# Patient Record
Sex: FEMALE | Race: WHITE | NOT HISPANIC OR LATINO | Employment: FULL TIME | URBAN - METROPOLITAN AREA
[De-identification: names, ages, dates, MRNs, and addresses within clinical notes are randomized per-mention and may not be internally consistent; named-entity substitution may affect disease eponyms.]

---

## 2017-04-10 ENCOUNTER — OFFICE VISIT (OUTPATIENT)
Dept: PODIATRY | Facility: CLINIC | Age: 57
End: 2017-04-10
Payer: COMMERCIAL

## 2017-04-10 VITALS
WEIGHT: 120 LBS | HEART RATE: 97 BPM | BODY MASS INDEX: 22.66 KG/M2 | DIASTOLIC BLOOD PRESSURE: 77 MMHG | SYSTOLIC BLOOD PRESSURE: 121 MMHG | HEIGHT: 61 IN | RESPIRATION RATE: 18 BRPM

## 2017-04-10 DIAGNOSIS — L60.9 DISEASE OF NAIL: Primary | ICD-10-CM

## 2017-04-10 PROCEDURE — 1160F RVW MEDS BY RX/DR IN RCRD: CPT | Mod: S$GLB,,, | Performed by: PODIATRIST

## 2017-04-10 PROCEDURE — 99203 OFFICE O/P NEW LOW 30 MIN: CPT | Mod: S$GLB,,, | Performed by: PODIATRIST

## 2017-04-10 PROCEDURE — 99999 PR PBB SHADOW E&M-EST. PATIENT-LVL III: CPT | Mod: PBBFAC,,, | Performed by: PODIATRIST

## 2017-04-10 RX ORDER — BUTALBITAL, ACETAMINOPHEN AND CAFFEINE 50; 325; 40 MG/1; MG/1; MG/1
TABLET ORAL
Refills: 0 | COMMUNITY
Start: 2017-03-14 | End: 2022-03-25

## 2017-04-10 RX ORDER — METHYLPREDNISOLONE 4 MG/1
TABLET ORAL
Refills: 0 | COMMUNITY
Start: 2017-03-22 | End: 2017-06-23

## 2017-04-10 NOTE — LETTER
April 10, 2017      Edwige Addison MD  [address]  [place] [address]           [facility]  [address]  [place] [address]  Phone: [phone]          Patient: Carmen Manriquez   MR Number: [id number]   YOB: 1960   Date of Visit: 4/10/2017       Dear Dr. Edwige Addison:    Thank you for referring Carmen Manriquez to me for evaluation. Attached you will find relevant portions of my assessment and plan of care.    If you have questions, please do not hesitate to call me. I look forward to following Carmen Manriquez along with you.    Sincerely,    Shruthi Brvao, MATIAS    Enclosure  CC:  No Recipients    If you would like to receive this communication electronically, please contact [email or web address] or [phone] to request more information on To8to Link access.    For providers and/or their staff who would like to refer a patient to [facility], please contact us through our one-stop-shop provider referral line, [facility], at [phone].    If you feel you have received this communication in error or would no longer like to receive these types of communications, please e-mail [email or web address]
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
## 2017-06-23 ENCOUNTER — HOSPITAL ENCOUNTER (OUTPATIENT)
Dept: RADIOLOGY | Facility: OTHER | Age: 57
Discharge: HOME | End: 2017-06-23
Attending: OTOLARYNGOLOGY
Payer: COMMERCIAL

## 2017-06-23 ENCOUNTER — ANESTHESIA EVENT (OUTPATIENT)
Dept: SURGERY | Facility: OTHER | Age: 57
End: 2017-06-23
Payer: COMMERCIAL

## 2017-06-23 ENCOUNTER — HOSPITAL ENCOUNTER (OUTPATIENT)
Dept: PREADMISSION TESTING | Facility: OTHER | Age: 57
Discharge: HOME | End: 2017-06-23
Attending: OTOLARYNGOLOGY
Payer: COMMERCIAL

## 2017-06-23 VITALS
HEART RATE: 80 BPM | DIASTOLIC BLOOD PRESSURE: 71 MMHG | HEIGHT: 62 IN | SYSTOLIC BLOOD PRESSURE: 138 MMHG | TEMPERATURE: 98 F | WEIGHT: 114 LBS | BODY MASS INDEX: 20.98 KG/M2 | OXYGEN SATURATION: 99 %

## 2017-06-23 DIAGNOSIS — J32.4 CHRONIC PANSINUSITIS: ICD-10-CM

## 2017-06-23 PROCEDURE — 70486 CT MAXILLOFACIAL W/O DYE: CPT | Mod: TC

## 2017-06-23 PROCEDURE — 70486 CT MAXILLOFACIAL W/O DYE: CPT | Mod: 26,,, | Performed by: RADIOLOGY

## 2017-06-23 RX ORDER — MIDAZOLAM HYDROCHLORIDE 5 MG/ML
5 INJECTION INTRAMUSCULAR; INTRAVENOUS
Status: DISCONTINUED | OUTPATIENT
Start: 2017-06-27 | End: 2017-06-24 | Stop reason: HOSPADM

## 2017-06-23 RX ORDER — FAMOTIDINE 20 MG/1
20 TABLET, FILM COATED ORAL
Status: CANCELLED | OUTPATIENT
Start: 2017-06-23 | End: 2017-06-23

## 2017-06-23 RX ORDER — SODIUM CHLORIDE, SODIUM LACTATE, POTASSIUM CHLORIDE, CALCIUM CHLORIDE 600; 310; 30; 20 MG/100ML; MG/100ML; MG/100ML; MG/100ML
INJECTION, SOLUTION INTRAVENOUS CONTINUOUS
Status: CANCELLED | OUTPATIENT
Start: 2017-06-23

## 2017-06-23 NOTE — DISCHARGE INSTRUCTIONS
PRE-ADMIT TESTING -  [phone]    [address]  [facility]        OUTPATIENT SURGERY UNIT - [phone]    Your surgery has been scheduled at [facility]. We are pleased to have the opportunity to serve you. For Further Information please call [phone].    On the day of surgery please report to the Information Desk on the 1st floor.    · CONTACT YOUR PHYSICIAN'S OFFICE THE DAY PRIOR TO YOUR SURGERY TO OBTAIN YOUR ARRIVAL TIME.     · The evening before surgery do not eat anything after 9 p.m. ( this includes hard candy, chewing gum and mints).  You may only have GATORADE, POWERADE AND WATER  from 9 p.m. until you leave your home.   DO NOT DRINK ANY LIQUIDS ON THE WAY TO THE HOSPITAL.      SPECIAL MEDICATION INSTRUCTIONS: TAKE medications checked off by the Anesthesiologist on your Medication List.    Angiogram Patients: Take medications as instructed by your physician, including aspirin.     Surgery Patients:    If you take ASPIRIN - Your PHYSICIAN/SURGEON will need to inform you IF/OR when you need to stop taking aspirin prior to your surgery.     Do Not take any medications containing IBUPROFEN.  Do Not Wear any make-up or dark nail polish   (especially eye make-up) to surgery. If you come to surgery with makeup on you will be required to remove the makeup or nail polish.    Do not shave your surgical area at least 5 days prior to your surgery. The surgical prep will be performed at the hospital according to Infection Control regulations.    Leave all valuables at home.   Do Not wear any jewelry or watches, including any metal in body piercings.  Contact Lens must be removed before surgery. Either do not wear the contact lens or bring a case and solution for storage.  Please bring a container for eyeglasses or dentures as required.  Bring any paperwork your physician has provided, such as consent forms,  history and physicals, doctor's orders, etc.   Bring comfortable clothes  that are loose fitting to wear upon discharge. Take into consideration the type of surgery being performed.  Maintain your diet as advised per your physician the day prior to surgery.      Adequate rest the night before surgery is advised.   Park in the Parking lot behind the hospital or in the [facility] across the street from the parking lot. Parking is complimentary.  If you will be discharged the same day as your procedure, please arrange for a responsible adult to drive you home or to accompany you if traveling by taxi.   YOU WILL NOT BE PERMITTED TO DRIVE OR TO LEAVE THE HOSPITAL ALONE AFTER SURGERY.   It is strongly recommended that you arrange for someone to remain with you for the first 24 hrs following your surgery.       Thank you for your cooperation.  The Staff of [facility].        Bathing Instructions                                                                 Please shower the evening before and morning of your procedure with    ANTIBACTERIAL SOAP. ( DIAL, etc )  Concentrate on the surgical area   for at least 3 minutes and rinse completely. Dry off as usual.   Do not use any deodorant, powder, body lotions, perfume, after shave or    cologne.

## 2017-06-23 NOTE — ANESTHESIA PREPROCEDURE EVALUATION
06/23/2017  Carmen Manriquez is a 57 y.o., female.    Anesthesia Evaluation    I have reviewed the Patient Summary Reports.    I have reviewed the Nursing Notes.   I have reviewed the Medications.     Review of Systems  Anesthesia Hx:  No problems with previous Anesthesia  Denies Family Hx of Anesthesia complications.   Denies Personal Hx of Anesthesia complications.   Social:  Former Smoker 6 cigs/day x one yr   Hematology/Oncology:  Hematology Normal   Oncology Normal     Cardiovascular:  Cardiovascular Normal     Pulmonary:  Pulmonary Normal    Renal/:  Renal/ Normal     Hepatic/GI:   GERD, well controlled    Musculoskeletal:  Musculoskeletal Normal    Neurological:  Neurology Normal    Endocrine:  Endocrine Normal        Physical Exam  General:  Well nourished    Airway/Jaw/Neck:  Airway Findings: Mouth Opening: Normal Tongue: Normal  General Airway Assessment: Adult  Mallampati: I  TM Distance: Normal, at least 6 cm         Dental:  Dental Findings: In tact             Anesthesia Plan  Type of Anesthesia, risks & benefits discussed:  Anesthesia Type:  general  Patient's Preference:   Intra-op Monitoring Plan: standard ASA monitors  Intra-op Monitoring Plan Comments:   Post Op Pain Control Plan:   Post Op Pain Control Plan Comments:   Induction:   IV  Beta Blocker:         Informed Consent: Patient understands risks and agrees with Anesthesia plan.  Questions answered. Anesthesia consent signed with patient.  ASA Score: 2     Day of Surgery Review of History & Physical:    H&P update referred to the surgeon.         Ready For Surgery From Anesthesia Perspective.

## 2017-06-26 DIAGNOSIS — Z12.31 OTHER SCREENING MAMMOGRAM: ICD-10-CM

## 2017-06-27 ENCOUNTER — HOSPITAL ENCOUNTER (OUTPATIENT)
Facility: OTHER | Age: 57
Discharge: HOME | End: 2017-06-27
Attending: OTOLARYNGOLOGY | Admitting: OTOLARYNGOLOGY
Payer: COMMERCIAL

## 2017-06-27 ENCOUNTER — ANESTHESIA (OUTPATIENT)
Dept: SURGERY | Facility: OTHER | Age: 57
End: 2017-06-27
Payer: COMMERCIAL

## 2017-06-27 VITALS
SYSTOLIC BLOOD PRESSURE: 120 MMHG | HEART RATE: 61 BPM | HEIGHT: 62 IN | OXYGEN SATURATION: 96 % | WEIGHT: 114 LBS | BODY MASS INDEX: 20.98 KG/M2 | DIASTOLIC BLOOD PRESSURE: 67 MMHG | TEMPERATURE: 98 F | RESPIRATION RATE: 16 BRPM

## 2017-06-27 DIAGNOSIS — H69.91 ACUTE DYSFUNCTION OF EUSTACHIAN TUBE, RIGHT: Primary | ICD-10-CM

## 2017-06-27 PROBLEM — H69.90: Status: ACTIVE | Noted: 2017-06-27

## 2017-06-27 PROCEDURE — 27201423 OPTIME MED/SURG SUP & DEVICES STERILE SUPPLY: Performed by: OTOLARYNGOLOGY

## 2017-06-27 PROCEDURE — 37000008 HC ANESTHESIA 1ST 15 MINUTES: Performed by: OTOLARYNGOLOGY

## 2017-06-27 PROCEDURE — 36000708 HC OR TIME LEV III 1ST 15 MIN: Performed by: OTOLARYNGOLOGY

## 2017-06-27 PROCEDURE — 36000709 HC OR TIME LEV III EA ADD 15 MIN: Performed by: OTOLARYNGOLOGY

## 2017-06-27 PROCEDURE — 37000009 HC ANESTHESIA EA ADD 15 MINS: Performed by: OTOLARYNGOLOGY

## 2017-06-27 PROCEDURE — 36000711: Performed by: OTOLARYNGOLOGY

## 2017-06-27 PROCEDURE — 88305 TISSUE EXAM BY PATHOLOGIST: CPT | Mod: 26,,, | Performed by: PATHOLOGY

## 2017-06-27 PROCEDURE — 63600175 PHARM REV CODE 636 W HCPCS: Performed by: ANESTHESIOLOGY

## 2017-06-27 PROCEDURE — 63600175 PHARM REV CODE 636 W HCPCS: Performed by: NURSE ANESTHETIST, CERTIFIED REGISTERED

## 2017-06-27 PROCEDURE — 36000710: Performed by: OTOLARYNGOLOGY

## 2017-06-27 PROCEDURE — 71000033 HC RECOVERY, INTIAL HOUR: Performed by: OTOLARYNGOLOGY

## 2017-06-27 PROCEDURE — 25000003 PHARM REV CODE 250: Performed by: ANESTHESIOLOGY

## 2017-06-27 PROCEDURE — 25000003 PHARM REV CODE 250: Performed by: NURSE ANESTHETIST, CERTIFIED REGISTERED

## 2017-06-27 PROCEDURE — 27800903 OPTIME MED/SURG SUP & DEVICES OTHER IMPLANTS: Performed by: OTOLARYNGOLOGY

## 2017-06-27 PROCEDURE — 71000039 HC RECOVERY, EACH ADD'L HOUR: Performed by: OTOLARYNGOLOGY

## 2017-06-27 PROCEDURE — 88305 TISSUE EXAM BY PATHOLOGIST: CPT | Performed by: PATHOLOGY

## 2017-06-27 PROCEDURE — 71000015 HC POSTOP RECOV 1ST HR: Performed by: OTOLARYNGOLOGY

## 2017-06-27 PROCEDURE — 25000003 PHARM REV CODE 250: Performed by: OTOLARYNGOLOGY

## 2017-06-27 PROCEDURE — 71000016 HC POSTOP RECOV ADDL HR: Performed by: OTOLARYNGOLOGY

## 2017-06-27 DEVICE — IMPLANTABLE DEVICE: Type: IMPLANTABLE DEVICE | Site: EAR | Status: FUNCTIONAL

## 2017-06-27 RX ORDER — ACETAMINOPHEN 10 MG/ML
INJECTION, SOLUTION INTRAVENOUS
Status: DISCONTINUED | OUTPATIENT
Start: 2017-06-27 | End: 2017-06-27

## 2017-06-27 RX ORDER — ONDANSETRON 8 MG/1
8 TABLET, ORALLY DISINTEGRATING ORAL EVERY 8 HOURS PRN
Qty: 10 TABLET | Refills: 0 | Status: SHIPPED | OUTPATIENT
Start: 2017-06-27 | End: 2017-11-07

## 2017-06-27 RX ORDER — GLYCOPYRROLATE 0.2 MG/ML
INJECTION INTRAMUSCULAR; INTRAVENOUS
Status: DISCONTINUED | OUTPATIENT
Start: 2017-06-27 | End: 2017-06-27

## 2017-06-27 RX ORDER — FENTANYL CITRATE 50 UG/ML
INJECTION, SOLUTION INTRAMUSCULAR; INTRAVENOUS
Status: DISCONTINUED | OUTPATIENT
Start: 2017-06-27 | End: 2017-06-27

## 2017-06-27 RX ORDER — FAMOTIDINE 20 MG/1
20 TABLET, FILM COATED ORAL
Status: COMPLETED | OUTPATIENT
Start: 2017-06-27 | End: 2017-06-27

## 2017-06-27 RX ORDER — SODIUM CHLORIDE, SODIUM LACTATE, POTASSIUM CHLORIDE, CALCIUM CHLORIDE 600; 310; 30; 20 MG/100ML; MG/100ML; MG/100ML; MG/100ML
INJECTION, SOLUTION INTRAVENOUS CONTINUOUS
Status: DISCONTINUED | OUTPATIENT
Start: 2017-06-27 | End: 2017-06-27 | Stop reason: HOSPADM

## 2017-06-27 RX ORDER — OFLOXACIN 3 MG/ML
SOLUTION/ DROPS OPHTHALMIC
Qty: 7.5 ML | Refills: 1 | Status: SHIPPED | OUTPATIENT
Start: 2017-06-27 | End: 2017-11-07

## 2017-06-27 RX ORDER — OXYCODONE HYDROCHLORIDE 5 MG/1
5 TABLET ORAL
Status: DISCONTINUED | OUTPATIENT
Start: 2017-06-27 | End: 2017-06-27 | Stop reason: HOSPADM

## 2017-06-27 RX ORDER — ROCURONIUM BROMIDE 10 MG/ML
INJECTION, SOLUTION INTRAVENOUS
Status: DISCONTINUED | OUTPATIENT
Start: 2017-06-27 | End: 2017-06-27

## 2017-06-27 RX ORDER — HYDROMORPHONE HYDROCHLORIDE 2 MG/ML
0.4 INJECTION, SOLUTION INTRAMUSCULAR; INTRAVENOUS; SUBCUTANEOUS EVERY 5 MIN PRN
Status: DISCONTINUED | OUTPATIENT
Start: 2017-06-27 | End: 2017-06-27 | Stop reason: HOSPADM

## 2017-06-27 RX ORDER — HYDROCODONE BITARTRATE AND ACETAMINOPHEN 5; 325 MG/1; MG/1
1 TABLET ORAL EVERY 4 HOURS PRN
Status: DISCONTINUED | OUTPATIENT
Start: 2017-06-27 | End: 2017-06-27 | Stop reason: HOSPADM

## 2017-06-27 RX ORDER — SODIUM CHLORIDE 0.9 % (FLUSH) 0.9 %
3 SYRINGE (ML) INJECTION EVERY 8 HOURS
Status: DISCONTINUED | OUTPATIENT
Start: 2017-06-27 | End: 2017-06-27 | Stop reason: HOSPADM

## 2017-06-27 RX ORDER — ONDANSETRON 2 MG/ML
INJECTION INTRAMUSCULAR; INTRAVENOUS
Status: DISCONTINUED | OUTPATIENT
Start: 2017-06-27 | End: 2017-06-27

## 2017-06-27 RX ORDER — CEPHALEXIN 500 MG/1
500 CAPSULE ORAL 3 TIMES DAILY
Qty: 30 CAPSULE | Refills: 0 | Status: SHIPPED | OUTPATIENT
Start: 2017-06-27 | End: 2017-11-07

## 2017-06-27 RX ORDER — MIDAZOLAM HYDROCHLORIDE 1 MG/ML
INJECTION INTRAMUSCULAR; INTRAVENOUS
Status: DISCONTINUED | OUTPATIENT
Start: 2017-06-27 | End: 2017-06-27

## 2017-06-27 RX ORDER — PROPOFOL 10 MG/ML
VIAL (ML) INTRAVENOUS
Status: DISCONTINUED | OUTPATIENT
Start: 2017-06-27 | End: 2017-06-27

## 2017-06-27 RX ORDER — DEXAMETHASONE SODIUM PHOSPHATE 4 MG/ML
INJECTION, SOLUTION INTRA-ARTICULAR; INTRALESIONAL; INTRAMUSCULAR; INTRAVENOUS; SOFT TISSUE
Status: DISCONTINUED | OUTPATIENT
Start: 2017-06-27 | End: 2017-06-27

## 2017-06-27 RX ORDER — LIDOCAINE HCL/PF 100 MG/5ML
SYRINGE (ML) INTRAVENOUS
Status: DISCONTINUED | OUTPATIENT
Start: 2017-06-27 | End: 2017-06-27

## 2017-06-27 RX ORDER — ONDANSETRON 2 MG/ML
4 INJECTION INTRAMUSCULAR; INTRAVENOUS EVERY 4 HOURS PRN
Status: DISCONTINUED | OUTPATIENT
Start: 2017-06-27 | End: 2017-06-27 | Stop reason: HOSPADM

## 2017-06-27 RX ORDER — NEOSTIGMINE METHYLSULFATE 1 MG/ML
INJECTION, SOLUTION INTRAVENOUS
Status: DISCONTINUED | OUTPATIENT
Start: 2017-06-27 | End: 2017-06-27

## 2017-06-27 RX ORDER — SODIUM CHLORIDE 0.9 % (FLUSH) 0.9 %
3 SYRINGE (ML) INJECTION
Status: DISCONTINUED | OUTPATIENT
Start: 2017-06-27 | End: 2017-06-27 | Stop reason: HOSPADM

## 2017-06-27 RX ORDER — HYDROCODONE BITARTRATE AND ACETAMINOPHEN 5; 325 MG/1; MG/1
1 TABLET ORAL EVERY 4 HOURS PRN
Qty: 40 TABLET | Refills: 0 | Status: SHIPPED | OUTPATIENT
Start: 2017-06-27 | End: 2017-11-07

## 2017-06-27 RX ORDER — FENTANYL CITRATE 50 UG/ML
25 INJECTION, SOLUTION INTRAMUSCULAR; INTRAVENOUS EVERY 5 MIN PRN
Status: DISCONTINUED | OUTPATIENT
Start: 2017-06-27 | End: 2017-06-27 | Stop reason: HOSPADM

## 2017-06-27 RX ORDER — OXYMETAZOLINE HCL 0.05 %
SPRAY, NON-AEROSOL (ML) NASAL
Status: DISCONTINUED | OUTPATIENT
Start: 2017-06-27 | End: 2017-06-27 | Stop reason: HOSPADM

## 2017-06-27 RX ORDER — PHENYLEPHRINE HYDROCHLORIDE 10 MG/ML
INJECTION INTRAVENOUS
Status: DISCONTINUED | OUTPATIENT
Start: 2017-06-27 | End: 2017-06-27

## 2017-06-27 RX ORDER — ONDANSETRON 8 MG/1
8 TABLET, ORALLY DISINTEGRATING ORAL EVERY 8 HOURS PRN
Status: DISCONTINUED | OUTPATIENT
Start: 2017-06-27 | End: 2017-06-27 | Stop reason: HOSPADM

## 2017-06-27 RX ORDER — MEPERIDINE HYDROCHLORIDE 50 MG/ML
12.5 INJECTION INTRAMUSCULAR; INTRAVENOUS; SUBCUTANEOUS ONCE AS NEEDED
Status: DISCONTINUED | OUTPATIENT
Start: 2017-06-27 | End: 2017-06-27 | Stop reason: HOSPADM

## 2017-06-27 RX ADMIN — GLYCOPYRROLATE 0.8 MG: 0.2 INJECTION, SOLUTION INTRAMUSCULAR; INTRAVENOUS at 11:06

## 2017-06-27 RX ADMIN — ONDANSETRON 8 MG: 8 TABLET, ORALLY DISINTEGRATING ORAL at 01:06

## 2017-06-27 RX ADMIN — GLYCOPYRROLATE 0.2 MG: 0.2 INJECTION, SOLUTION INTRAMUSCULAR; INTRAVENOUS at 10:06

## 2017-06-27 RX ADMIN — ROCURONIUM BROMIDE 40 MG: 10 INJECTION, SOLUTION INTRAVENOUS at 10:06

## 2017-06-27 RX ADMIN — LIDOCAINE HYDROCHLORIDE 100 MG: 20 INJECTION, SOLUTION INTRAVENOUS at 10:06

## 2017-06-27 RX ADMIN — FAMOTIDINE 20 MG: 20 TABLET, FILM COATED ORAL at 07:06

## 2017-06-27 RX ADMIN — PHENYLEPHRINE HYDROCHLORIDE 200 MCG: 10 INJECTION INTRAVENOUS at 10:06

## 2017-06-27 RX ADMIN — MIDAZOLAM HYDROCHLORIDE 2 MG: 1 INJECTION, SOLUTION INTRAMUSCULAR; INTRAVENOUS at 08:06

## 2017-06-27 RX ADMIN — CARBOXYMETHYLCELLULOSE SODIUM 2 DROP: 2.5 SOLUTION/ DROPS OPHTHALMIC at 10:06

## 2017-06-27 RX ADMIN — HYDROMORPHONE HYDROCHLORIDE 0.4 MG: 2 INJECTION INTRAMUSCULAR; INTRAVENOUS; SUBCUTANEOUS at 12:06

## 2017-06-27 RX ADMIN — FENTANYL CITRATE 100 MCG: 50 INJECTION, SOLUTION INTRAMUSCULAR; INTRAVENOUS at 10:06

## 2017-06-27 RX ADMIN — ACETAMINOPHEN 1000 MG: 10 INJECTION, SOLUTION INTRAVENOUS at 10:06

## 2017-06-27 RX ADMIN — MIDAZOLAM HYDROCHLORIDE 2 MG: 1 INJECTION, SOLUTION INTRAMUSCULAR; INTRAVENOUS at 09:06

## 2017-06-27 RX ADMIN — PROPOFOL 200 MG: 10 INJECTION, EMULSION INTRAVENOUS at 10:06

## 2017-06-27 RX ADMIN — NEOSTIGMINE METHYLSULFATE 5 MG: 1 INJECTION INTRAVENOUS at 11:06

## 2017-06-27 RX ADMIN — HYDROMORPHONE HYDROCHLORIDE 0.4 MG: 2 INJECTION INTRAMUSCULAR; INTRAVENOUS; SUBCUTANEOUS at 11:06

## 2017-06-27 RX ADMIN — OXYCODONE HYDROCHLORIDE 5 MG: 5 TABLET ORAL at 11:06

## 2017-06-27 RX ADMIN — PHENYLEPHRINE HYDROCHLORIDE 300 MCG: 10 INJECTION INTRAVENOUS at 10:06

## 2017-06-27 RX ADMIN — ONDANSETRON 4 MG: 2 INJECTION INTRAMUSCULAR; INTRAVENOUS at 10:06

## 2017-06-27 RX ADMIN — SODIUM CHLORIDE, SODIUM LACTATE, POTASSIUM CHLORIDE, AND CALCIUM CHLORIDE: 600; 310; 30; 20 INJECTION, SOLUTION INTRAVENOUS at 07:06

## 2017-06-27 RX ADMIN — DEXAMETHASONE SODIUM PHOSPHATE 8 MG: 4 INJECTION, SOLUTION INTRAMUSCULAR; INTRAVENOUS at 10:06

## 2017-06-27 NOTE — BRIEF OP NOTE
[facility]  Brief Operative Note     SUMMARY     Surgery Date: 6/27/2017     Surgeon(s) and Role:     * Shea Moss MD - Primary    Assisting Surgeon: None    Pre-op Diagnosis:  Acute dysfunction of eustachian tube, right [H69.81]    Post-op Diagnosis:  Post-Op Diagnosis Codes:     * Acute dysfunction of eustachian tube, right [H69.81]    Procedure(s) (LRB):  INSERTION TUBE-PE (Right)  ETHMOIDECTOMY (N/A)  SINUS SURGERY FUNCTIONAL ENDOSCOPIC (N/A)    Anesthesia: General    Description of the findings of the procedure: right effusion, chronic inflammatory changes in maxillary sinuses    Findings/Key Components: same    Estimated Blood Loss: * No values recorded between 6/27/2017 10:28 AM and 6/27/2017 11:23 AM *         Specimens:   Specimen (12h ago through future)    Start     Ordered    06/27/17 1118  Specimen to Pathology - Surgery  Once     Comments:  1. Left sinus content2. Sinus shavings      06/27/17 1117          Discharge Note    SUMMARY     Admit Date: 6/27/2017    Discharge Date and Time:  06/27/2017 11:23 AM    Hospital Course (synopsis of major diagnoses, care, treatment, and services provided during the course of the hospital stay): see operative report     Final Diagnosis: Post-Op Diagnosis Codes:     * Acute dysfunction of eustachian tube, right [H69.81]    Disposition: Home or Self Care    Follow Up/Patient Instructions:     Medications:  Reconciled Home Medications:   Current Discharge Medication List      START taking these medications    Details   cephALEXin (KEFLEX) 500 MG capsule Take 1 capsule (500 mg total) by mouth 3 (three) times daily.  Qty: 30 capsule, Refills: 0      hydrocodone-acetaminophen 5-325mg (NORCO) 5-325 mg per tablet Take 1 tablet by mouth every 4 (four) hours as needed for Pain.  Qty: 40 tablet, Refills: 0      ofloxacin (OCUFLOX) 0.3 % ophthalmic solution Use 4 drops to right ear 2x/day  Qty: 7.5 mL, Refills: 1      ondansetron (ZOFRAN-ODT) 8 MG  TbDL Take 1 tablet (8 mg total) by mouth every 8 (eight) hours as needed.  Qty: 10 tablet, Refills: 0         CONTINUE these medications which have NOT CHANGED    Details   butalbital-acetaminophen-caffeine -40 mg (FIORICET, ESGIC) -40 mg per tablet take 1 tablet by mouth every 4 hours if needed for headache  Refills: 0      lorazepam (ATIVAN) 0.5 MG tablet Take 0.5 mg by mouth as needed for Anxiety.      TAZORAC 0.05 % Crea cream Apply to face nightly as needed             Discharge Procedure Orders  Diet general   Order Comments: Advance diet as tolerated.     Activity as tolerated   Order Comments: Sleep head of bed elevated for 72hrs  No heavy lifting  No nose blowing  No strenuous activity  No driving while on pain medications     Call MD for:  temperature >100.4     Call MD for:  persistent nausea and vomiting     Call MD for:  severe uncontrolled pain     Call MD for:  difficulty breathing, headache or visual disturbances       Follow-up Information     Shea Moss MD In 1 week.    Specialty:  Otolaryngology  Contact information:  [address] [facility] [address]  [phone]

## 2017-06-27 NOTE — ANESTHESIA POSTPROCEDURE EVALUATION
"Anesthesia Post Evaluation    Patient: Carmen Manriquez    Procedure(s) Performed: Procedure(s) (LRB):  INSERTION TUBE-PE (Right)  ETHMOIDECTOMY (N/A)  SINUS SURGERY FUNCTIONAL ENDOSCOPIC (N/A)    Final Anesthesia Type: general  Patient location during evaluation: PACU  Patient participation: Yes- Able to Participate  Level of consciousness: awake and alert  Post-procedure vital signs: reviewed and stable  Pain management: adequate  Airway patency: patent  PONV status at discharge: No PONV  Anesthetic complications: no      Cardiovascular status: blood pressure returned to baseline  Respiratory status: unassisted, spontaneous ventilation and room air  Hydration status: euvolemic  Follow-up not needed.        Visit Vitals  BP (!) 109/57   Pulse (!) 53   Temp 36.7 °C (98 °F) (Other (see comments))   Resp 16   Ht 5' 1.5" (1.562 m)   Wt 51.7 kg (114 lb)   SpO2 98%   Breastfeeding? No   BMI 21.19 kg/m²       Pain/Cornelio Score: Pain Assessment Performed: Yes (6/27/2017 12:28 PM)  Presence of Pain: complains of pain/discomfort (6/27/2017 12:28 PM)  Pain Rating Prior to Med Admin: 6 (6/27/2017 12:17 PM)  Pain Rating Post Med Admin: 4 (6/27/2017 12:28 PM)  Cornelio Score: 10 (6/27/2017 12:28 PM)      "

## 2017-06-27 NOTE — DISCHARGE INSTRUCTIONS
No nose blowing, only open mouth sneezing, sleep with head elevated, no heavy lifting or strenuous activity x 10 days.   Use saline nasal spray 3x/day.       After Tympanostomy (Ear Tubes)  Your childs hearing should improve once the tubes are in place. For best results, follow up as instructed by your childs surgeon. In some cases, ear problems may continue. However, you can help prevent ear infections by using good ear care.    Follow-up  · Shortly after the surgery, your childs surgeon may want to examine your child. This follow-up visit ensures that the tubes are still in place and that your childs ears are healing.  · After the initial follow-up, the doctor may want to see your child every few months. Do your best to keep these visits. Theyre the only way to make sure the tubes remain in place and stay open.  · Most tubes stay in place for about a year. Some last longer. The life of the tube often depends on your childs growth. Most tubes fall out on their own. In rare cases, tubes need to be removed by the surgeon.  Fewer problems  · Even with tubes, your child may still get ear infections. Cranky behavior, ear drainage, and fever are all clues that you should be calling your child's health care provider. However, as long as the tubes are working, you can expect fewer problems and a quicker recovery.  · If an infection does occur, it will likely respond to antibiotic ear drops. For more severe infections, oral antibiotics may be added. Always make sure your child finishes the entire prescription. Otherwise, the medication may not work. Use only ear drops prescribed by your childs provider.  Ear care  · Ask your child's health care provider if your childs ears should be protected from contact with water. Your child may need to wear earplugs during swimming and bathing if they put their heads under water.  · Do not use any ear drops in your child's ears, unless prescribed by the surgeon or other  provider.  · Do not use cotton swabs to clean the ears. Used carelessly, they can clog tubes with wax or even damage the eardrum  When to call your child's health care provider  Call your child's provider is he or she is showing any signs of the following:  · Bloody drainage from the ears  · Drainage from the ears that doesn't stop  · Ear pain  · Fever  · Trouble hearing  · Problems with balance   Date Last Reviewed: 9/4/2014 © 2000-2016 [employer]. [address] [place] [id number]. All rights reserved. This information is not intended as a substitute for professional medical care. Always follow your healthcare professional's instructions.        Nasal Surgery: Your Recovery  Youve just had nasal surgery. During the first weeks after surgery, be sure to follow the advice of your doctor. The tips on this sheet can help speed your recovery.  Tips for healing  · Dont take medicines containing aspirin or ibuprofen.  · Don't bump your nose or touch the splint or packing.  · Don't bend or lift.  · Sneeze or cough with your mouth open to reduce pressure inside your nose.  · Keep from blowing your nose until youre told its OK to do so.  · Keep eyeglasses from resting on your nose by taping them above the nose.  · Protect your nose from the sun.  · After packing is removed, start saltwater rinses if prescribed.  · Keep follow-up appointments with your doctor.  Follow-up visits  Your doctor will most likely want to see you within a week to check your healing. Any packing, splint, or dressings will probably be removed at that time. You may feel slight discomfort and bleed a little when this is done.  Assessing the results  During later follow-up visits, your doctor will assess your healing and the results of your surgery. Talk with your doctor about any problems or concerns you may have.  When to call the doctor  Call the doctor if you notice any of the following:  · Sudden increase in pain,  swelling, or bruising  · Fever  · Heavy bleeding  · Yellow or greenish drainage from the nose  · Unrelieved headache  · Decreased or double vision  · Stiff neck or very tired feeling   Date Last Reviewed: 11/1/2016 © 2000-2016 [employer]. [address] [place] [id number]. All rights reserved. This information is not intended as a substitute for professional medical care. Always follow your healthcare professional's instructions.      Anesthesia: After Your Surgery  Youve just had surgery. During surgery, you received medication called anesthesia to keep you comfortable and pain-free. After surgery, you may experience some pain or nausea. This is common. Here are some tips for feeling better and recovering after surgery.    Going home  Your doctor or nurse will show you how to take care of yourself when you go home. He or she will also answer your questions. Have an adult family member or friend drive you home. For the first 24 hours after your surgery:  · Do not drive or use heavy equipment.  · Do not make important decisions or sign legal documents.  · Avoid alcohol.  · Have someone stay with you, if needed. He or she can watch for problems and help keep you safe.  Be sure to keep all follow-up appointments with your doctor. And rest after your procedure for as long as your doctor tells you to.    Coping with pain  If you have pain after surgery, pain medication will help you feel better. Take it as directed, before pain becomes severe. Also, ask your doctor or pharmacist about other ways to control pain, such as with heat, ice, and relaxation. And follow any other instructions your surgeon or nurse gives you.    Tips for taking pain medication  To get the best relief possible, remember these points:  · Pain medications can upset your stomach. Taking them with a little food may help.  · Most pain relievers taken by mouth need at least 20 to 30 minutes to take effect.  · Taking medication  on a schedule can help you remember to take it. Try to time your medication so that you can take it before beginning an activity, such as dressing, walking, or sitting down for dinner.  · Constipation is a common side effect of pain medications. Contact your doctor before taking any medications like laxatives or stool softeners to help relieve constipation. Also ask about any dietary restrictions, because drinking lots of fluids and eating foods like fruits and vegetables that are high in fiber can also help. Remember, dont take laxatives unless your surgeon has prescribed them.  · Mixing alcohol and pain medication can cause dizziness and slow your breathing. It can even be fatal. Dont drink alcohol while taking pain medication.  · Pain medication can slow your reflexes. Dont drive or operate machinery while taking pain medication.  If your health care provider tells you to take acetaminophen to help relieve your pain, ask him or her how much you are supposed to take each day. (Acetaminophen is the generic name for Tylenol and other brand-name pain relievers.) Acetaminophen or other pain relievers may interact with your prescription medicines or other over-the-counter (OTC) drugs. Some prescription medications contain acetaminophen along with other active ingredients. Using both prescription and OTC acetaminophen for pain can cause you to overdose. The FDA recommends that you read the labels on your OTC medications carefully. This will help you to clearly understand the list of active ingredients, dosing instructions, and any warnings. It may also help you avoid taking too much acetaminophen. If you have questions or don't understand the information, ask your pharmacist or health care provider to explain it to you before you take the OTC medication.    Managing nausea  Some people have an upset stomach after surgery. This is often due to anesthesia, pain, pain medications, or the stress of surgery. The following  tips will help you manage nausea and get good nutrition as you recover. If you were on a special diet before surgery, ask your doctor if you should follow it during recovery. These tips may help:  · Dont push yourself to eat. Your body will tell you when to eat and how much.  · Start off with clear liquids and soup. They are easier to digest.  · Progress to semi-solid foods (mashed potatoes, applesauce, and gelatin) as you feel ready.  · Slowly move to solid foods. Dont eat fatty, rich, or spicy foods at first.  · Dont force yourself to have three large meals a day. Instead, eat smaller amounts more often.  · Take pain medications with a small amount of solid food, such as crackers or toast to avoid nausea.      Call your surgeon if  · You still have pain an hour after taking medication (it may not be strong enough).  · You feel too sleepy, dizzy, or groggy (medication may be too strong).  · You have side effects like nausea, vomiting, or skin changes (rash, itching, or hives).   © 3923-7013 [employer]. [address] [place] [address], [place] [address]. All rights reserved. This information is not intended as a substitute for professional medical care. Always follow your healthcare professional's instructions.

## 2017-06-27 NOTE — TRANSFER OF CARE
"Anesthesia Transfer of Care Note    Patient: Carmen Manriquez    Procedure(s) Performed: Procedure(s) (LRB):  INSERTION TUBE-PE (Right)  ETHMOIDECTOMY (N/A)  SINUS SURGERY FUNCTIONAL ENDOSCOPIC (N/A)    Patient location: PACU    Anesthesia Type: general    Transport from OR: Transported from OR on 2-3 L/min O2 by NC with adequate spontaneous ventilation    Post pain: adequate analgesia    Post assessment: no apparent anesthetic complications    Post vital signs: stable    Level of consciousness: awake, alert and oriented    Nausea/Vomiting: no nausea/vomiting    Complications: none    Transfer of care protocol was followed      Last vitals:   Visit Vitals  /63 (BP Location: Left arm, Patient Position: Sitting, BP Method: Automatic)   Pulse 77   Temp 36.7 °C (98 °F) (Oral)   Resp 16   Ht 5' 1.5" (1.562 m)   Wt 51.7 kg (114 lb)   SpO2 100%   Breastfeeding? No   BMI 21.19 kg/m²     "

## 2017-06-27 NOTE — INTERVAL H&P NOTE
The patient has been examined and the H&P has been reviewed:    I concur with the findings and no changes have occurred since H&P was written.    Anesthesia/Surgery risks, benefits and alternative options discussed and understood by patient/family.          Active Hospital Problems    Diagnosis  POA    Acute dysfunction of eustachian tube [H69.80]  Yes      Resolved Hospital Problems    Diagnosis Date Resolved POA   No resolved problems to display.

## 2017-07-13 NOTE — OP NOTE
DATE OF PROCEDURE:  06/27/2017.    POSTOPERATIVE DIAGNOSES:  Eustachian tube dysfunction and chronic sinusitis.    PROCEDURES PERFORMED:  Right myringotomy with T-tube placement as well as   bilateral navigational functional endoscopic sinus surgery to include bilateral   maxillary antrostomies.    PROCEDURE IN DETAIL:  The patient was placed in a supine position.  General   anesthesia was induced.  The patient was intubated per Anesthesia with no   difficulty.  First, the right ear was inspected under the microscope.  The   myringotomy was made in the anterior inferior quadrant.  Minimal middle ear   effusion was noted and suctioned.  A T-tube was then placed into the myringotomy   with no difficulty.  The ear was then suctioned and hemostasis was ensured.    Next, Afrin cottonoids were placed into the nasal cavity.  The navigation system   was then registered and confirmed.  First, the right middle turbinate   attachment was injected with 1% lidocaine with 1:100,000 epinephrine.  The   middle turbinate was medialized and an Afrin cottonoid was placed into the   middle meatus.  A similar procedure was then performed on the left side.    Attention was then turned back to the right side.  The cottonoid was then   removed.  The maxillary os was then found using an ostium seeker.  The uncinate   was deflected and removed.  The maxillary antrostomy site was then widened using   a microdebrider.  Next, attention was turned to the left side.  Again, the   natural maxillary os was palpated using ostium seeker.  The uncinate was   deflected and removed.  The maxillary antrostomy site was widened using a   microdebrider.  The nasal cavities were then copiously irrigated on both sides   and hemostasis was ensured.  Sinu-Foam was placed into the middle meati   bilaterally and the patient was awakened from general anesthesia in satisfactory   condition and brought to the Recovery Room.    FINDINGS:  The patient had a minimal  middle ear effusion noted in the right ear.    The patient had chronic inflammatory changes within the middle meati   bilaterally.    BLOOD LOSS:  Approximately 30 mL    COMPLICATIONS:  None.    CONDITION:  The patient was stable.      AI/  dd: 06/27/2017 11:30:55 (CDT)  td: 07/13/2017 03:14:22 (CDT)  Doc ID   #[id number]  Job ID #[id number]    CC:

## 2017-07-17 ENCOUNTER — PATIENT MESSAGE (OUTPATIENT)
Dept: ADMINISTRATIVE | Facility: HOSPITAL | Age: 57
End: 2017-07-17

## 2017-11-02 ENCOUNTER — OFFICE VISIT (OUTPATIENT)
Dept: URGENT CARE | Facility: CLINIC | Age: 57
End: 2017-11-02
Payer: COMMERCIAL

## 2017-11-02 VITALS
OXYGEN SATURATION: 100 % | HEART RATE: 88 BPM | SYSTOLIC BLOOD PRESSURE: 124 MMHG | RESPIRATION RATE: 18 BRPM | BODY MASS INDEX: 21.52 KG/M2 | TEMPERATURE: 99 F | HEIGHT: 61 IN | WEIGHT: 114 LBS | DIASTOLIC BLOOD PRESSURE: 80 MMHG

## 2017-11-02 DIAGNOSIS — M54.50 ACUTE RIGHT-SIDED LOW BACK PAIN WITHOUT SCIATICA: Primary | ICD-10-CM

## 2017-11-02 DIAGNOSIS — M46.1 SI (SACROILIAC) JOINT INFLAMMATION: ICD-10-CM

## 2017-11-02 PROCEDURE — 96372 THER/PROPH/DIAG INJ SC/IM: CPT | Mod: S$GLB,,, | Performed by: EMERGENCY MEDICINE

## 2017-11-02 PROCEDURE — 99214 OFFICE O/P EST MOD 30 MIN: CPT | Mod: 25,S$GLB,, | Performed by: PHYSICIAN ASSISTANT

## 2017-11-02 RX ORDER — KETOROLAC TROMETHAMINE 30 MG/ML
60 INJECTION, SOLUTION INTRAMUSCULAR; INTRAVENOUS
Status: COMPLETED | OUTPATIENT
Start: 2017-11-02 | End: 2017-11-02

## 2017-11-02 RX ORDER — METHOCARBAMOL 500 MG/1
1000 TABLET, FILM COATED ORAL 3 TIMES DAILY
Qty: 30 TABLET | Refills: 0 | Status: SHIPPED | OUTPATIENT
Start: 2017-11-02 | End: 2017-11-07

## 2017-11-02 RX ADMIN — KETOROLAC TROMETHAMINE 60 MG: 30 INJECTION, SOLUTION INTRAMUSCULAR; INTRAVENOUS at 05:11

## 2017-11-02 NOTE — PROGRESS NOTES
"Subjective:       Patient ID: Carmen Manriquez is a 57 y.o. female.    Vitals:  height is 5' 1" (1.549 m) and weight is 51.7 kg (114 lb). Her temperature is 98.7 °F (37.1 °C). Her blood pressure is 124/80 and her pulse is 88. Her respiration is 18 and oxygen saturation is 100%.     Chief Complaint: Back Pain    This is a 57 y.o. female with Past Medical History:  No date: Abnormal Pap smear  No date: Allergy  No date: Endometriosis, moderate  No date: GERD (gastroesophageal reflux disease)  No date: Heart palpitations   who presents today with a chief complaint; of lower back pain this am after taking a shower.         Back Pain   This is a new problem. The current episode started today. The problem occurs constantly. The problem is unchanged. The pain is present in the lumbar spine. The pain is at a severity of 9/10. The pain is moderate. Pertinent negatives include no abdominal pain, chest pain, fever or headaches. She has tried walking and muscle relaxant for the symptoms. The treatment provided no relief.     Review of Systems   Constitution: Negative for chills and fever.   HENT: Negative for sore throat.    Eyes: Negative for blurred vision.   Cardiovascular: Negative for chest pain.   Respiratory: Negative for shortness of breath.    Skin: Negative for rash.   Musculoskeletal: Positive for back pain and stiffness. Negative for joint pain.   Gastrointestinal: Negative for abdominal pain, diarrhea, nausea and vomiting.   Neurological: Negative for headaches.   Psychiatric/Behavioral: The patient is not nervous/anxious.        Objective:      Physical Exam   Constitutional: She is oriented to person, place, and time. She appears well-developed and well-nourished.   HENT:   Head: Normocephalic and atraumatic.   Eyes: Conjunctivae are normal.   Neck: Normal range of motion. Neck supple. No thyromegaly present.   Cardiovascular: Normal rate and regular rhythm.  Exam reveals no gallop and no friction rub.    No murmur " heard.  Pulmonary/Chest: Effort normal and breath sounds normal. She has no wheezes. She has no rales.   Musculoskeletal:        Lumbar back: She exhibits decreased range of motion, tenderness (right SI joint) and bony tenderness.   Lymphadenopathy:     She has no cervical adenopathy.   Neurological: She is alert and oriented to person, place, and time. She has normal strength. No sensory deficit.   Skin: Skin is warm and dry. No rash noted. No erythema.   Psychiatric: She has a normal mood and affect. Her behavior is normal. Judgment and thought content normal.   Nursing note and vitals reviewed.      5:56 PM - Lumbar xray shows no acute fracture.    Assessment:       1. Acute right-sided low back pain without sciatica    2. SI (sacroiliac) joint inflammation        Plan:         Acute right-sided low back pain without sciatica  -     ketorolac injection 60 mg; Inject 2 mLs (60 mg total) into the muscle one time.  -     methocarbamol (ROBAXIN) 500 MG Tab; Take 2 tablets (1,000 mg total) by mouth 3 (three) times daily.  Dispense: 30 tablet; Refill: 0  -     X-Ray Lumbar Spine AP And Lateral; Future; Expected date: 11/02/2017  -     Ambulatory referral to Orthopedics    SI (sacroiliac) joint inflammation  -     ketorolac injection 60 mg; Inject 2 mLs (60 mg total) into the muscle one time.  -     methocarbamol (ROBAXIN) 500 MG Tab; Take 2 tablets (1,000 mg total) by mouth 3 (three) times daily.  Dispense: 30 tablet; Refill: 0  -     Ambulatory referral to Orthopedics      Carmen was seen today for back pain.    Diagnoses and all orders for this visit:    Acute right-sided low back pain without sciatica  -     ketorolac injection 60 mg; Inject 2 mLs (60 mg total) into the muscle one time.  -     methocarbamol (ROBAXIN) 500 MG Tab; Take 2 tablets (1,000 mg total) by mouth 3 (three) times daily.  -     X-Ray Lumbar Spine AP And Lateral; Future  -     Ambulatory referral to Orthopedics    SI (sacroiliac) joint  inflammation  -     ketorolac injection 60 mg; Inject 2 mLs (60 mg total) into the muscle one time.  -     methocarbamol (ROBAXIN) 500 MG Tab; Take 2 tablets (1,000 mg total) by mouth 3 (three) times daily.  -     Ambulatory referral to Orthopedics      Patient Instructions   - Rest.    - Drink plenty of fluids.    - Tylenol as directed as needed for fever/pain.    - Take your Mobic as prescribed.  - Warm compresses to the affected area for 20 minutes at a time.   - Follow up with your PCP or specialty clinic as directed in the next 1-2 weeks if not improved or as needed.  You can call [phone] to schedule an appointment with the appropriate provider.    - Go to the ED if your symptoms worsen.    Back Pain (Acute or Chronic)    Back pain is one of the most common problems. The good news is that most people feel better in 1 to 2 weeks, and most of the rest in 1 to 2 months. Most people can remain active.  People experience and describe pain differently; not everyone is the same.  · The pain can be sharp, stabbing, shooting, aching, cramping or burning.  · Movement, standing, bending, lifting, sitting, or walking may worsen pain.  · It can be localized to one spot or area, or it can be more generalized.  · It can spread or radiate upwards, to the front, or go down your arms or legs (sciatica).  · It can cause muscle spasm.  Most of the time, mechanical problems with the muscles or spine cause the pain. Mechanical problems are usually caused by an injury to the muscles or ligaments. While illness can cause back pain, it is usually not caused by a serious illness. Mechanical problems include:   · Physical activity such as sports, exercise, work, or normal activity  · Overexertion, lifting, pushing, pulling incorrectly or too aggressively  · Sudden twisting, bending, or stretching from an accident, or accidental movement  · Poor posture  · Stretching or moving wrong, without noticing pain at the time  · Poor  coordination, lack of regular exercise (check with your doctor about this)  · Spinal disc disease or arthritis  · Stress  Pain can also be related to pregnancy, or illness like appendicitis, bladder or kidney infections, pelvic infections, and many other things.  Acute back pain usually gets better in 1 to 2 weeks. Back pain related to disk disease, arthritis in the spinal joints or spinal stenosis (narrowing of the spinal canal) can become chronic and last for months or years.  Unless you had a physical injury (for example, a car accident or fall) X-rays are usually not needed for the initial evaluation of back pain. If pain continues and does not respond to medical treatment, X-rays and other tests may be needed.  Home care  Try these home care recommendations:  · When in bed, try to find a position of comfort. A firm mattress is best. Try lying flat on your back with pillows under your knees. You can also try lying on your side with your knees bent up towards your chest and a pillow between your knees.  · At first, do not try to stretch out the sore spots. If there is a strain, it is not like the good soreness you get after exercising without an injury. In this case, stretching may make it worse.  · Avoid prolong sitting, long car rides, or travel. This puts more stress on the lower back than standing or walking.  · During the first 24 to 72 hours after an acute injury or flare up of chronic back pain, apply an ice pack to the painful area for 20 minutes and then remove it for 20 minutes. Do this over a period of 60 to 90 minutes or several times a day. This will reduce swelling and pain. Wrap the ice pack in a thin towel or plastic to protect your skin.  · You can start with ice, then switch to heat. Heat (hot shower, hot bath, or heating pad) reduces pain and works well for muscle spasms. Heat can be applied to the painful area for 20 minutes then remove it for 20 minutes. Do this over a period of 60 to 90  minutes or several times a day. Do not sleep on a heating pad. It can lead to skin burns or tissue damage.  · You can alternate ice and heat therapy. Talk with your doctor about the best treatment for your back pain.  · Therapeutic massage can help relax the back muscles without stretching them.  · Be aware of safe lifting methods and do not lift anything without stretching first.  Medicines  Talk to your doctor before using medicine, especially if you have other medical problems or are taking other medicines.  · You may use over-the-counter medicine as directed on the bottle to control pain, unless another pain medicine was prescribed. If you have chronic conditions like diabetes, liver or kidney disease, stomach ulcers, or gastrointestinal bleeding, or are taking blood thinners, talk to your doctor before taking any medicine.  · Be careful if you are given a prescription medicines, narcotics, or medicine for muscle spasms. They can cause drowsiness, affect your coordination, reflexes, and judgement. Do not drive or operate heavy machinery.  Follow-up care  Follow up with your healthcare provider, or as advised.   A radiologist will review any X-rays that were taken. Your provide will notify you of any new findings that may affect your care.  Call 911  Call emergency services if any of the following occur:  · Trouble breathing  · Confusion  · Very drowsy or trouble awakening  · Fainting or loss of consciousness  · Rapid or very slow heart rate  · Loss of bowel or bladder control  When to seek medical advice  Call your healthcare provider right away if any of these occur:   · Pain becomes worse or spreads to your legs  · Weakness or numbness in one or both legs  · Numbness in the groin or genital area  Date Last Reviewed: 7/1/2016 © 2000-2017 [employer]. [address] [place] [address], [place] [address]. All rights reserved. This information is not intended as a substitute for professional medical care.  Always follow your healthcare professional's instructions.        Sacroiliitis  The sacrum is the triangle-shaped bone at the base of the spine. It is linked to the other pelvis bones by the sacroiliac joints, also called SI joints. Sacroiliitis is when one or both SI joints are hurt or inflamed. It can make small movements of the lower back and pelvis very painful.        This condition has been linked to other diseases. They include ankylosing spondylitis, rheumatoid arthritis, psoriasis, and Crohns disease or colitis. Symptoms may include pain or stiffness in the hips, lower back, thighs, or buttocks. Pain occurs most often in the morning or after sitting for long periods of time. The pain may get worse when you walk. The swinging motion of the hips strains the SI joints.  Sacroiliitis is caused by many factors such as:  · Heavy lifting, especially if not done the right way  · Severe injury, such as a fall or car accident  · Osteoarthritis  · Pregnancy  · Infection of the joint  This condition is hard to diagnose. It may be confused with other causes of low back pain. To confirm the diagnosis, you may be given a shot of numbing medicine in the SI joint. Treatment includes rest, physical therapy, and anti-inflammatory medicines. If another health problem is the cause, then that must also be treated. More testing may be needed if your symptoms dont get better.  Home care  · If your healthcare provider has prescribed medicines, take all of them as directed.  · You may use over-the-counter pain medicine to control pain, unless another medicine was prescribed. If prednisone was prescribed, dont use NSAIDs, or nonsteroidal anti-inflammatory drugs, such as ibuprofen or naproxen. Talk with your provider before using this medicine if you have chronic liver or kidney disease or ever had a stomach ulcer or GI bleeding.  · If you were referred to physical therapy, make an appointment. Be sure to do any prescribed  exercises.  · Dont smoke. Smoking reduces blood flow to the inflamed area. This makes it harder to treat.  Follow-up care  Follow up with your healthcare provider, or as advised.  If you had an X-ray or an MRI, you will be notified of any new findings that may affect your care.  When to seek medical advice  Contact your healthcare provider right away if any of these occur:  · Increasing low back pain  · Inflammation of the eyes  · Skin rash or redness  · Weakness or numbness in one or both legs  · Loss of bowel or bladder control  · Numbness in the groin area  Date Last Reviewed: 11/24/2015  © 0795-9686 [employer]. [address] [place] [address], [place] [address]. All rights reserved. This information is not intended as a substitute for professional medical care. Always follow your healthcare professional's instructions.

## 2017-11-02 NOTE — PATIENT INSTRUCTIONS
- Rest.    - Drink plenty of fluids.    - Tylenol as directed as needed for fever/pain.    - Take your Mobic as prescribed.  - Warm compresses to the affected area for 20 minutes at a time.   - Follow up with your PCP or specialty clinic as directed in the next 1-2 weeks if not improved or as needed.  You can call [phone] to schedule an appointment with the appropriate provider.    - Go to the ED if your symptoms worsen.    Back Pain (Acute or Chronic)    Back pain is one of the most common problems. The good news is that most people feel better in 1 to 2 weeks, and most of the rest in 1 to 2 months. Most people can remain active.  People experience and describe pain differently; not everyone is the same.  · The pain can be sharp, stabbing, shooting, aching, cramping or burning.  · Movement, standing, bending, lifting, sitting, or walking may worsen pain.  · It can be localized to one spot or area, or it can be more generalized.  · It can spread or radiate upwards, to the front, or go down your arms or legs (sciatica).  · It can cause muscle spasm.  Most of the time, mechanical problems with the muscles or spine cause the pain. Mechanical problems are usually caused by an injury to the muscles or ligaments. While illness can cause back pain, it is usually not caused by a serious illness. Mechanical problems include:   · Physical activity such as sports, exercise, work, or normal activity  · Overexertion, lifting, pushing, pulling incorrectly or too aggressively  · Sudden twisting, bending, or stretching from an accident, or accidental movement  · Poor posture  · Stretching or moving wrong, without noticing pain at the time  · Poor coordination, lack of regular exercise (check with your doctor about this)  · Spinal disc disease or arthritis  · Stress  Pain can also be related to pregnancy, or illness like appendicitis, bladder or kidney infections, pelvic infections, and many other things.  Acute back pain  usually gets better in 1 to 2 weeks. Back pain related to disk disease, arthritis in the spinal joints or spinal stenosis (narrowing of the spinal canal) can become chronic and last for months or years.  Unless you had a physical injury (for example, a car accident or fall) X-rays are usually not needed for the initial evaluation of back pain. If pain continues and does not respond to medical treatment, X-rays and other tests may be needed.  Home care  Try these home care recommendations:  · When in bed, try to find a position of comfort. A firm mattress is best. Try lying flat on your back with pillows under your knees. You can also try lying on your side with your knees bent up towards your chest and a pillow between your knees.  · At first, do not try to stretch out the sore spots. If there is a strain, it is not like the good soreness you get after exercising without an injury. In this case, stretching may make it worse.  · Avoid prolong sitting, long car rides, or travel. This puts more stress on the lower back than standing or walking.  · During the first 24 to 72 hours after an acute injury or flare up of chronic back pain, apply an ice pack to the painful area for 20 minutes and then remove it for 20 minutes. Do this over a period of 60 to 90 minutes or several times a day. This will reduce swelling and pain. Wrap the ice pack in a thin towel or plastic to protect your skin.  · You can start with ice, then switch to heat. Heat (hot shower, hot bath, or heating pad) reduces pain and works well for muscle spasms. Heat can be applied to the painful area for 20 minutes then remove it for 20 minutes. Do this over a period of 60 to 90 minutes or several times a day. Do not sleep on a heating pad. It can lead to skin burns or tissue damage.  · You can alternate ice and heat therapy. Talk with your doctor about the best treatment for your back pain.  · Therapeutic massage can help relax the back muscles without  stretching them.  · Be aware of safe lifting methods and do not lift anything without stretching first.  Medicines  Talk to your doctor before using medicine, especially if you have other medical problems or are taking other medicines.  · You may use over-the-counter medicine as directed on the bottle to control pain, unless another pain medicine was prescribed. If you have chronic conditions like diabetes, liver or kidney disease, stomach ulcers, or gastrointestinal bleeding, or are taking blood thinners, talk to your doctor before taking any medicine.  · Be careful if you are given a prescription medicines, narcotics, or medicine for muscle spasms. They can cause drowsiness, affect your coordination, reflexes, and judgement. Do not drive or operate heavy machinery.  Follow-up care  Follow up with your healthcare provider, or as advised.   A radiologist will review any X-rays that were taken. Your provide will notify you of any new findings that may affect your care.  Call 911  Call emergency services if any of the following occur:  · Trouble breathing  · Confusion  · Very drowsy or trouble awakening  · Fainting or loss of consciousness  · Rapid or very slow heart rate  · Loss of bowel or bladder control  When to seek medical advice  Call your healthcare provider right away if any of these occur:   · Pain becomes worse or spreads to your legs  · Weakness or numbness in one or both legs  · Numbness in the groin or genital area  Date Last Reviewed: 7/1/2016 © 2000-2017 [employer]. [address] [place] [address]. All rights reserved. This information is not intended as a substitute for professional medical care. Always follow your healthcare professional's instructions.        Sacroiliitis  The sacrum is the triangle-shaped bone at the base of the spine. It is linked to the other pelvis bones by the sacroiliac joints, also called SI joints. Sacroiliitis is when one or both SI joints are hurt  or inflamed. It can make small movements of the lower back and pelvis very painful.        This condition has been linked to other diseases. They include ankylosing spondylitis, rheumatoid arthritis, psoriasis, and Crohns disease or colitis. Symptoms may include pain or stiffness in the hips, lower back, thighs, or buttocks. Pain occurs most often in the morning or after sitting for long periods of time. The pain may get worse when you walk. The swinging motion of the hips strains the SI joints.  Sacroiliitis is caused by many factors such as:  · Heavy lifting, especially if not done the right way  · Severe injury, such as a fall or car accident  · Osteoarthritis  · Pregnancy  · Infection of the joint  This condition is hard to diagnose. It may be confused with other causes of low back pain. To confirm the diagnosis, you may be given a shot of numbing medicine in the SI joint. Treatment includes rest, physical therapy, and anti-inflammatory medicines. If another health problem is the cause, then that must also be treated. More testing may be needed if your symptoms dont get better.  Home care  · If your healthcare provider has prescribed medicines, take all of them as directed.  · You may use over-the-counter pain medicine to control pain, unless another medicine was prescribed. If prednisone was prescribed, dont use NSAIDs, or nonsteroidal anti-inflammatory drugs, such as ibuprofen or naproxen. Talk with your provider before using this medicine if you have chronic liver or kidney disease or ever had a stomach ulcer or GI bleeding.  · If you were referred to physical therapy, make an appointment. Be sure to do any prescribed exercises.  · Dont smoke. Smoking reduces blood flow to the inflamed area. This makes it harder to treat.  Follow-up care  Follow up with your healthcare provider, or as advised.  If you had an X-ray or an MRI, you will be notified of any new findings that may affect your care.  When to seek  medical advice  Contact your healthcare provider right away if any of these occur:  · Increasing low back pain  · Inflammation of the eyes  · Skin rash or redness  · Weakness or numbness in one or both legs  · Loss of bowel or bladder control  · Numbness in the groin area  Date Last Reviewed: 11/24/2015  © 5624-7795 [employer]. [address] [place] [address], [place] [address]. All rights reserved. This information is not intended as a substitute for professional medical care. Always follow your healthcare professional's instructions.

## 2017-11-02 NOTE — LETTER
November 2, 2017      [facility]  [address]  [facility] [address]  Phone: [phone]  Fax: [phone]       Patient: Carmen Manriquez   YOB: 1960  Date of Visit: 11/02/2017    To Whom It May Concern:    Fuad Manriquez  was at [facility] on 11/02/2017. She may return to work/school on November 6, 2017 with no restrictions. If you have any questions or concerns, or if I can be of further assistance, please do not hesitate to contact me.    Sincerely,        Natalia Fonseca PA-C

## 2017-11-03 DIAGNOSIS — M54.50 LUMBAR SPINE PAIN: Primary | ICD-10-CM

## 2017-11-07 ENCOUNTER — HOSPITAL ENCOUNTER (OUTPATIENT)
Dept: RADIOLOGY | Facility: HOSPITAL | Age: 57
Discharge: HOME | End: 2017-11-07
Attending: PHYSICIAN ASSISTANT
Payer: COMMERCIAL

## 2017-11-07 ENCOUNTER — PATIENT MESSAGE (OUTPATIENT)
Dept: ORTHOPEDICS | Facility: CLINIC | Age: 57
End: 2017-11-07

## 2017-11-07 ENCOUNTER — OFFICE VISIT (OUTPATIENT)
Dept: ORTHOPEDICS | Facility: CLINIC | Age: 57
End: 2017-11-07
Payer: COMMERCIAL

## 2017-11-07 VITALS — WEIGHT: 117.63 LBS | BODY MASS INDEX: 22.21 KG/M2 | HEIGHT: 61 IN

## 2017-11-07 DIAGNOSIS — M54.16 LUMBAR RADICULOPATHY: Primary | ICD-10-CM

## 2017-11-07 DIAGNOSIS — M54.50 LUMBAR SPINE PAIN: ICD-10-CM

## 2017-11-07 PROCEDURE — 99999 PR PBB SHADOW E&M-EST. PATIENT-LVL III: CPT | Mod: PBBFAC,,, | Performed by: PHYSICIAN ASSISTANT

## 2017-11-07 PROCEDURE — 72100 X-RAY EXAM L-S SPINE 2/3 VWS: CPT | Mod: 26,,, | Performed by: RADIOLOGY

## 2017-11-07 PROCEDURE — 72100 X-RAY EXAM L-S SPINE 2/3 VWS: CPT | Mod: TC

## 2017-11-07 PROCEDURE — 99204 OFFICE O/P NEW MOD 45 MIN: CPT | Mod: S$GLB,,, | Performed by: PHYSICIAN ASSISTANT

## 2017-11-07 RX ORDER — NITROFURANTOIN 25; 75 MG/1; MG/1
CAPSULE ORAL
COMMUNITY
Start: 2017-08-22 | End: 2017-11-07

## 2017-11-07 RX ORDER — METHYLPREDNISOLONE 4 MG/1
TABLET ORAL
Qty: 1 PACKAGE | Refills: 0 | Status: SHIPPED | OUTPATIENT
Start: 2017-11-07 | End: 2018-03-13

## 2017-11-07 RX ORDER — CYCLOBENZAPRINE HCL 10 MG
10 TABLET ORAL 3 TIMES DAILY PRN
Qty: 60 TABLET | Refills: 0 | Status: SHIPPED | OUTPATIENT
Start: 2017-11-07 | End: 2017-11-17

## 2017-11-07 RX ORDER — MELOXICAM 7.5 MG/1
TABLET ORAL
COMMUNITY
Start: 2017-11-02 | End: 2018-07-18

## 2017-11-07 RX ORDER — METHENAMINE, SODIUM PHOSPHATE, MONOBASIC, ANHYDROUS, PHENYL SALICYLATE, METHYLENE BLUE AND HYOSCYAMINE SULFATE 118; 40.8; 36; 10; .12 MG/1; MG/1; MG/1; MG/1; MG/1
CAPSULE ORAL
COMMUNITY
Start: 2017-08-14 | End: 2018-03-13

## 2017-11-07 NOTE — PROGRESS NOTES
DATE: 2017  PATIENT: Carmen Manriquez    Supervising Physician: Junior Olivier M.D.    CHIEF COMPLAINT: back and bilateral buttock pain    HISTORY:  Carmen Manriquez is a 57 y.o. female here for initial evaluation of low back and bilateral buttock pain (Back - 5, Leg - 0).  The pain has been present for 5 days. The patient describes the pain as sharp.  The pain is worse with extension and walking and improved by laying down. There is no associated numbness and tingling. There is no subjective weakness. Prior treatments have included heat, robaxin, mobic and a toradol injection, but no physical therapy, ESIs or surgery.    The patient denies myelopathic symptoms such as handwriting changes or difficulty with buttons/coins/keys. Denies perineal paresthesias, bowel/bladder dysfunction.    PAST MEDICAL/SURGICAL HISTORY:  Past Medical History:   Diagnosis Date    Abnormal Pap smear     Allergy     Endometriosis, moderate     GERD (gastroesophageal reflux disease)     Heart palpitations      Past Surgical History:   Procedure Laterality Date    BILE DUCT EXPLORATION       SECTION, CLASSIC      CHOLECYSTECTOMY      ENDOMETRIAL ABLATION      hx benign breast biopsy      leep      TONSILLECTOMY      TUBAL LIGATION         Medications:   Current Outpatient Prescriptions on File Prior to Visit   Medication Sig Dispense Refill    butalbital-acetaminophen-caffeine -40 mg (FIORICET, ESGIC) -40 mg per tablet take 1 tablet by mouth every 4 hours if needed for headache  0    lorazepam (ATIVAN) 0.5 MG tablet Take 0.5 mg by mouth as needed for Anxiety.      TAZORAC 0.05 % Crea cream Apply to face nightly as needed      [DISCONTINUED] methocarbamol (ROBAXIN) 500 MG Tab Take 2 tablets (1,000 mg total) by mouth 3 (three) times daily. 30 tablet 0    [DISCONTINUED] cephALEXin (KEFLEX) 500 MG capsule Take 1 capsule (500 mg total) by mouth 3 (three) times daily. 30 capsule 0    [DISCONTINUED]  "hydrocodone-acetaminophen 5-325mg (NORCO) 5-325 mg per tablet Take 1 tablet by mouth every 4 (four) hours as needed for Pain. 40 tablet 0    [DISCONTINUED] ofloxacin (OCUFLOX) 0.3 % ophthalmic solution Use 4 drops to right ear 2x/day 7.5 mL 1    [DISCONTINUED] ondansetron (ZOFRAN-ODT) 8 MG TbDL Take 1 tablet (8 mg total) by mouth every 8 (eight) hours as needed. 10 tablet 0     No current facility-administered medications on file prior to visit.        Social History:   Social History     Social History    Marital status:      Spouse name: N/A    Number of children: 1    Years of education: N/A     Occupational History     [employer]     Social History Main Topics    Smoking status: Light Tobacco Smoker     Packs/day: 0.50     Last attempt to quit: 12/8/2015    Smokeless tobacco: Never Used      Comment: smoker since age 14, used to smoke 1 ppd    Alcohol use 2.4 oz/week     4 Cans of beer per week    Drug use: No    Sexual activity: Yes     Partners: Male     Birth control/ protection: Post-menopausal     Other Topics Concern    Not on file     Social History Narrative    No exercise at present.       REVIEW OF SYSTEMS:  Constitution: Negative. Negative for chills, fever and night sweats.   Cardiovascular: Negative for chest pain and syncope.   Respiratory: Negative for cough and shortness of breath.   Gastrointestinal: See HPI. Negative for nausea/vomiting. Negative for abdominal pain.  Genitourinary: See HPI. Negative for discoloration or dysuria.  Skin: Negative for dry skin, itching and rash.   Hematologic/Lymphatic: Negative for bleeding problem. Does not bruise/bleed easily.   Musculoskeletal: Negative for falls and muscle weakness.   Neurological: See HPI. No seizures.   Endocrine: Negative for polydipsia, polyphagia and polyuria.   Allergic/Immunologic: Negative for hives and persistent infections.     EXAM:  Ht 5' 1" (1.549 m)   Wt 53.4 kg (117 lb 9.9 " oz)   BMI 22.22 kg/m²     General: The patient is a very pleasant 57 y.o. female in no apparent distress, the patient is oriented to person, place and time.  Psych: Normal mood and affect  HEENT: Vision grossly intact, hearing intact to the spoken word.  Lungs: Respirations unlabored.  Gait: Normal station and gait, no difficulty with toe or heel walk.   Skin: Dorsal lumbar skin negative for rashes, lesions, hairy patches and surgical scars. There is minimal lumbar tenderness to palpation.  Range of motion: Lumbar range of motion is acceptable.  Spinal Balance: Global saggital and coronal spinal balance acceptable, not significant for scoliosis and kyphosis.  Musculoskeletal: No pain with the range of motion of the bilateral hips. No trochanteric tenderness to palpation.  Vascular: Bilateral lower extremities warm and well perfused, dorsalis pedis pulses 2+ bilaterally.  Neurological: Normal strength and tone in all major motor groups in the bilateral lower extremities. Normal sensation to light touch in the L2-S1 dermatomes bilaterally.  Deep tendon reflexes symmetric 2+ in the bilateral lower extremities.  Negative Babinski bilaterally. Straight leg raise negative bilaterally.    IMAGING:      Today I personally reviewed AP, Lat and Flex/Ex  upright L-spine films that demonstrate normal disc spacing and alignment.  No acute abnormalities.        ASSESSMENT/PLAN:    Diagnoses and all orders for this visit:    Lumbar radiculopathy    Other orders  -     methylPREDNISolone (MEDROL DOSEPACK) 4 mg tablet; use as directed  -     cyclobenzaprine (FLEXERIL) 10 MG tablet; Take 1 tablet (10 mg total) by mouth 3 (three) times daily as needed for Muscle spasms.        Medrol dose pack and flexeril sent to the pharmacy.  She was given a handout with strengthening and stretching exercises to be done at home.  Follow up in 3-4 weeks if symptoms persist.         Return if symptoms worsen or fail to improve.

## 2017-11-07 NOTE — LETTER
November 7, 2017      Natalia Fonseca PA-C  [address] [facility] [address]           [facility]  [address]  [place] [address]  Phone: [phone]          Patient: Carmen Manriquez   MR Number: [id number]   YOB: 1960   Date of Visit: 11/7/2017       Dear Natalia Fonseca:    Thank you for referring Carmen Manriquez to me for evaluation. Attached you will find relevant portions of my assessment and plan of care.    If you have questions, please do not hesitate to call me. I look forward to following Carmen Manriquez along with you.    Sincerely,    Purvi Sanchez PA-C    Enclosure  CC:  No Recipients    If you would like to receive this communication electronically, please contact [email or web address] or [phone] to request more information on [employer] Link access.    For providers and/or their staff who would like to refer a patient to [facility], please contact us through our one-stop-shop provider referral line, [facility], at [phone].    If you feel you have received this communication in error or would no longer like to receive these types of communications, please e-mail [email or web address]

## 2017-11-21 DIAGNOSIS — M25.561 CHRONIC PAIN OF BOTH KNEES: ICD-10-CM

## 2017-11-21 DIAGNOSIS — G89.29 CHRONIC PAIN OF BOTH KNEES: ICD-10-CM

## 2017-11-21 DIAGNOSIS — M25.562 CHRONIC PAIN OF BOTH KNEES: ICD-10-CM

## 2017-11-21 RX ORDER — MELOXICAM 7.5 MG/1
TABLET ORAL
Qty: 60 TABLET | Refills: 0 | Status: SHIPPED | OUTPATIENT
Start: 2017-11-21 | End: 2018-03-13 | Stop reason: SDUPTHER

## 2018-03-13 ENCOUNTER — HOSPITAL ENCOUNTER (OUTPATIENT)
Dept: RADIOLOGY | Facility: HOSPITAL | Age: 58
Discharge: HOME | End: 2018-03-13
Attending: INTERNAL MEDICINE
Payer: COMMERCIAL

## 2018-03-13 ENCOUNTER — OFFICE VISIT (OUTPATIENT)
Dept: INTERNAL MEDICINE | Facility: CLINIC | Age: 58
End: 2018-03-13
Payer: COMMERCIAL

## 2018-03-13 VITALS
HEIGHT: 61 IN | HEART RATE: 66 BPM | DIASTOLIC BLOOD PRESSURE: 80 MMHG | SYSTOLIC BLOOD PRESSURE: 132 MMHG | WEIGHT: 120.56 LBS | BODY MASS INDEX: 22.76 KG/M2

## 2018-03-13 DIAGNOSIS — M25.562 CHRONIC PAIN OF BOTH KNEES: ICD-10-CM

## 2018-03-13 DIAGNOSIS — M25.50 ARTHRALGIA, UNSPECIFIED JOINT: Primary | ICD-10-CM

## 2018-03-13 DIAGNOSIS — M54.9 BACK PAIN, UNSPECIFIED BACK LOCATION, UNSPECIFIED BACK PAIN LATERALITY, UNSPECIFIED CHRONICITY: ICD-10-CM

## 2018-03-13 DIAGNOSIS — R53.83 FATIGUE, UNSPECIFIED TYPE: ICD-10-CM

## 2018-03-13 DIAGNOSIS — M25.552 PAIN OF BOTH HIP JOINTS: ICD-10-CM

## 2018-03-13 DIAGNOSIS — M25.561 CHRONIC PAIN OF BOTH KNEES: ICD-10-CM

## 2018-03-13 DIAGNOSIS — M25.50 ARTHRALGIA, UNSPECIFIED JOINT: ICD-10-CM

## 2018-03-13 DIAGNOSIS — G89.29 CHRONIC PAIN OF BOTH KNEES: ICD-10-CM

## 2018-03-13 DIAGNOSIS — M25.551 PAIN OF BOTH HIP JOINTS: ICD-10-CM

## 2018-03-13 DIAGNOSIS — M77.8 RIGHT ELBOW TENDONITIS: ICD-10-CM

## 2018-03-13 PROCEDURE — 99214 OFFICE O/P EST MOD 30 MIN: CPT | Mod: S$GLB,,, | Performed by: INTERNAL MEDICINE

## 2018-03-13 PROCEDURE — 99999 PR PBB SHADOW E&M-EST. PATIENT-LVL IV: CPT | Mod: PBBFAC,,, | Performed by: INTERNAL MEDICINE

## 2018-03-13 PROCEDURE — 73521 X-RAY EXAM HIPS BI 2 VIEWS: CPT | Mod: 26,,, | Performed by: RADIOLOGY

## 2018-03-13 PROCEDURE — 73521 X-RAY EXAM HIPS BI 2 VIEWS: CPT | Mod: TC

## 2018-03-13 RX ORDER — MELOXICAM 7.5 MG/1
TABLET ORAL
Qty: 60 TABLET | Refills: 1 | Status: SHIPPED | OUTPATIENT
Start: 2018-03-13 | End: 2018-04-11 | Stop reason: SDUPTHER

## 2018-03-13 RX ORDER — CYCLOBENZAPRINE HCL 5 MG
5 TABLET ORAL NIGHTLY PRN
Qty: 30 TABLET | Refills: 2 | Status: SHIPPED | OUTPATIENT
Start: 2018-03-13 | End: 2018-03-23

## 2018-03-13 NOTE — PROGRESS NOTES
Subjective:       Patient ID: Carmen Manriquez is a 57 y.o. female.    Chief Complaint: Generalized Body Aches   this is a 57-year-old who presents today for urgent visit with aches and pains.  She's had ongoing trouble intermittently with arthritis in her knees initially that seems to be better she had aggravated her back and November . No injury but woke up with discomfort one day. She  went to the spine center where she was treated with steroid and muscle relaxer it seemed to help. she has used muscle relaxer on occasion at night . She would like a refill it did seem to help she has had trouble since that time with her right elbow tendinitis she's been using the arm pain and trying to watch her overall activity is been slow to improve.  She has problems with both of her hips nothing seems to help much although recently she's been back on meloxican which helps some but symptoms did not seem to be resolving which concerned her .She has been back to smoking after quitting for a while   No joint swelling symtpoms today are right elbow and bilateral hips     HPI  Review of Systems   Constitutional: Positive for activity change. Negative for unexpected weight change.   HENT: Negative for hearing loss, rhinorrhea and trouble swallowing.    Eyes: Negative for discharge and visual disturbance.   Respiratory: Negative for chest tightness and wheezing.    Cardiovascular: Positive for palpitations. Negative for chest pain.   Gastrointestinal: Positive for constipation. Negative for blood in stool, diarrhea and vomiting.   Endocrine: Negative for polydipsia and polyuria.   Genitourinary: Negative for difficulty urinating, dysuria, hematuria and menstrual problem.   Musculoskeletal: Positive for arthralgias and neck pain. Negative for joint swelling.   Neurological: Positive for headaches. Negative for weakness.   Psychiatric/Behavioral: Positive for dysphoric mood. Negative for confusion.       Objective:     Blood pressure  "132/80, pulse 66, height 5' 1" (1.549 m), weight 54.7 kg (120 lb 9.5 oz).    Physical Exam   Constitutional: No distress.   HENT:   Head: Normocephalic.   Mouth/Throat: Oropharynx is clear and moist.   Normal rom no neck pain  Crepitus shoulder    Eyes: No scleral icterus.   Neck: Neck supple.   Cardiovascular: Normal rate, regular rhythm and normal heart sounds.  Exam reveals no gallop and no friction rub.    No murmur heard.  Pulmonary/Chest: Effort normal and breath sounds normal. No respiratory distress.   Abdominal: Soft. Bowel sounds are normal. She exhibits no mass. There is no tenderness.   Musculoskeletal: She exhibits no edema.   Knees normal rom  Right elbow tendonitis    Negative leg raise   No joint swelling noted    Neurological: She is alert.   Skin: No erythema.   Psychiatric: She has a normal mood and affect.   Vitals reviewed.      Assessment:       1. Arthralgia, unspecified joint    2. Pain of both hip joints    3. Right elbow tendonitis    4. Back pain, unspecified back location, unspecified back pain laterality, unspecified chronicity    5. Chronic pain of both knees    6. Fatigue, unspecified type        Plan:       Carmen was seen today for generalized body aches.    Diagnoses and all orders for this visit:    Arthralgia, unspecified joint  -     Basic metabolic panel; Future  -     CBC auto differential; Future  -     Hepatic function panel; Future  Hx positive aroldo will recheck   -     AROLDO; Future  -     Rheumatoid factor; Future  -     Cyclic citrul peptide antibody, IgG; Future  -     Vitamin D; Future  -     Sedimentation rate, manual; Future  -     C-reactive protein; Future  -     X-Ray Hips Bilateral 2 View Incl AP Pelvis; Future    Pain of both hip joints  -     Ambulatory consult to Physical Therapy    Right elbow tendonitis  -     Ambulatory consult to Physical Therapy    Back pain, unspecified back location, unspecified back pain laterality, unspecified chronicity  -     Ambulatory " consult to Physical Therapy    Chronic pain of both knees  -     meloxicam (MOBIC) 7.5 MG tablet; 1-2 tablets qd prn pain    Fatigue, unspecified type  -     TSH; Future    Other orders  -     cyclobenzaprine (FLEXERIL) 5 MG tablet; Take 1 tablet (5 mg total) by mouth nightly as needed for Muscle spasms.    She follows with her outlying gynecologist for her pap and mammogram    Home bp monitoring advised    She was encouraaged to schedule physical home bp monitoring call with elevations or concern     Trial resume physical therapy for her back elbow and hips see if some improvement over eleazar

## 2018-03-13 NOTE — PROGRESS NOTES
Answers for HPI/ROS submitted by the patient on 3/12/2018   activity change: Yes  unexpected weight change: No  neck pain: Yes  hearing loss: No  rhinorrhea: No  trouble swallowing: No  eye discharge: No  visual disturbance: No  chest tightness: No  wheezing: No  chest pain: No  palpitations: Yes  blood in stool: No  constipation: Yes  vomiting: No  diarrhea: No  polydipsia: No  polyuria: No  difficulty urinating: No  hematuria: No  menstrual problem: No  dysuria: No  joint swelling: No  arthralgias: Yes  headaches: Yes  weakness: No  confusion: No  dysphoric mood: Yes

## 2018-03-16 ENCOUNTER — TELEPHONE (OUTPATIENT)
Dept: INTERNAL MEDICINE | Facility: CLINIC | Age: 58
End: 2018-03-16

## 2018-03-16 NOTE — TELEPHONE ENCOUNTER
----- Message from Angie Sanchez sent at 3/16/2018 11:26 AM CDT -----  Contact: self/[phone]  Pt called in regards to a missed call from the office.      Please advise

## 2018-03-21 ENCOUNTER — CLINICAL SUPPORT (OUTPATIENT)
Dept: SMOKING CESSATION | Facility: CLINIC | Age: 58
End: 2018-03-21
Payer: COMMERCIAL

## 2018-03-21 DIAGNOSIS — F17.200 NICOTINE DEPENDENCE: Primary | ICD-10-CM

## 2018-03-21 PROCEDURE — 99407 BEHAV CHNG SMOKING > 10 MIN: CPT | Mod: S$GLB,,,

## 2018-03-26 ENCOUNTER — PATIENT MESSAGE (OUTPATIENT)
Dept: INTERNAL MEDICINE | Facility: CLINIC | Age: 58
End: 2018-03-26

## 2018-03-26 DIAGNOSIS — M25.50 ARTHRALGIA, UNSPECIFIED JOINT: Primary | ICD-10-CM

## 2018-04-11 ENCOUNTER — LAB VISIT (OUTPATIENT)
Dept: LAB | Facility: HOSPITAL | Age: 58
End: 2018-04-11
Attending: INTERNAL MEDICINE
Payer: COMMERCIAL

## 2018-04-11 ENCOUNTER — OFFICE VISIT (OUTPATIENT)
Dept: RHEUMATOLOGY | Facility: CLINIC | Age: 58
End: 2018-04-11
Payer: COMMERCIAL

## 2018-04-11 VITALS
DIASTOLIC BLOOD PRESSURE: 88 MMHG | HEART RATE: 84 BPM | HEIGHT: 61 IN | BODY MASS INDEX: 23.19 KG/M2 | SYSTOLIC BLOOD PRESSURE: 134 MMHG | WEIGHT: 122.81 LBS

## 2018-04-11 DIAGNOSIS — M79.10 MYALGIA: Primary | ICD-10-CM

## 2018-04-11 DIAGNOSIS — M70.62 TROCHANTERIC BURSITIS OF BOTH HIPS: ICD-10-CM

## 2018-04-11 DIAGNOSIS — M79.10 MYALGIA: ICD-10-CM

## 2018-04-11 DIAGNOSIS — I73.00 RAYNAUD'S PHENOMENON WITHOUT GANGRENE: Chronic | ICD-10-CM

## 2018-04-11 DIAGNOSIS — R76.8 POSITIVE ANA (ANTINUCLEAR ANTIBODY): Chronic | ICD-10-CM

## 2018-04-11 DIAGNOSIS — M70.61 TROCHANTERIC BURSITIS OF BOTH HIPS: ICD-10-CM

## 2018-04-11 LAB — CK SERPL-CCNC: 199 U/L

## 2018-04-11 PROCEDURE — 82550 ASSAY OF CK (CPK): CPT

## 2018-04-11 PROCEDURE — 99214 OFFICE O/P EST MOD 30 MIN: CPT | Mod: S$GLB,,, | Performed by: INTERNAL MEDICINE

## 2018-04-11 PROCEDURE — 82085 ASSAY OF ALDOLASE: CPT

## 2018-04-11 PROCEDURE — 36415 COLL VENOUS BLD VENIPUNCTURE: CPT

## 2018-04-11 PROCEDURE — 99999 PR PBB SHADOW E&M-EST. PATIENT-LVL III: CPT | Mod: PBBFAC,,, | Performed by: INTERNAL MEDICINE

## 2018-04-11 RX ORDER — CYCLOBENZAPRINE HCL 10 MG
5 TABLET ORAL NIGHTLY PRN
Qty: 30 TABLET | Refills: 2 | Status: SHIPPED | OUTPATIENT
Start: 2018-04-11 | End: 2020-12-02

## 2018-04-11 ASSESSMENT — ROUTINE ASSESSMENT OF PATIENT INDEX DATA (RAPID3)
PATIENT GLOBAL ASSESSMENT SCORE: 6
PAIN SCORE: 4
AM STIFFNESS SCORE: 1, YES
PSYCHOLOGICAL DISTRESS SCORE: 3.3
MDHAQ FUNCTION SCORE: .5
FATIGUE SCORE: 4.5
TOTAL RAPID3 SCORE: 3.89

## 2018-04-11 NOTE — PROGRESS NOTES
"Subjective:       Patient ID: Carmen Manriquez is a 57 y.o. female.    Chief Complaint: Disease Management    F/U Knee pain, +ALEXUS, Raynauds  Last seen by me Nov 2016    Knees had stabilized ; was not taking Mobic  Up until Nov had been exercising 3-4x/week    Nov had a "pinched nerve in back" getting out of shower; stopped exercising ; went to ortho and got xrays and medrol pack; got some better but then hips started hurting  About a month ago restarted mobic 7.5 mg  Cannot get comfortable at night and sleeps poorly   Awakens feeling "hit by a truck"  More pain in hips; indicates lateral hips, L > R; sitting, standing, exercising, stretching  When stretches muscles they ache more than she expects  Neck has been hurting since exercised it a week ago    Started taking "powdered collagen" a couple mo ago ; can't tell if it helps    Now "does not have motivation" to exercise  "just so damn tired"..."I know that I should"    Also "singh", emotionally labile "can cry"    PMH:   HA ; Fioricet  Hx IBS with constipation; Linzess helps but causes diarrhea  Hx rhinitis on Flonase  Hx insomnia with prn lorazepam or flexeril; 1/2 flexeril helps better than lorazepam  Breast bx 2014  Hx GB surgery after biliary colic; some ongoing issue requiring bile duct f/u (Dr. Leonel Richards)      Review of Systems   Constitutional: Negative for fever and unexpected weight change.   HENT: Negative for trouble swallowing.    Eyes: Positive for redness.   Respiratory: Negative for cough and shortness of breath.    Cardiovascular: Negative for chest pain.   Gastrointestinal: Positive for constipation. Negative for diarrhea.        IBS with constipation   Genitourinary: Negative for dysuria and genital sores.   Skin: Positive for color change. Negative for rash.        Raynaud's this winter but no skin breakdown   Neurological: Positive for headaches.   Hematological: Does not bruise/bleed easily.         Objective:   /88   Pulse 84   Ht 5' " "1" (1.549 m)   Wt 55.7 kg (122 lb 12.8 oz)   BMI 23.20 kg/m²      Physical Exam   Neurological: She has normal reflexes. She exhibits normal muscle tone. Coordination normal.   Skin: No rash noted.     Musculoskeletal:   Nl UE   Nl neck and spine exam  Sl tender trapezius  LE: nl hips except for lateral tenderness  Knees normal exam  Ankles/feet nl         recent normal CBC, CMP  Recent ALEXUS sl pos with negative panel  Assessment:       1. Myalgia    2. Trochanteric bursitis of both hips    3. Positive ALEXUS (antinuclear antibody)    4. Raynaud's phenomenon without gangrene        Diffuse myalgia that is not quite FMS but suggests functional disorder  Lateral hip tenderness c/w trochanteric bursitis  Hx knee pain without clinical picture of significant progression of OA  Hx + ALEXUS without any progressive autoimmune disease  Hx Raynaud's without complication    Plan:     1. Continue meloxicam for bursitis  2. Cont flexeril 5  Mg hs for myalgia, insomnia, along with stress management  3. Consult PT for bursitis and for fitness program  4. email progress report in a few weeks  5. F/u prn        "

## 2018-04-11 NOTE — LETTER
April 11, 2018      Edwige Addison MD  [address]  [place] [address]           [facility]  [address]  [place] [address]  Phone: [phone]  Fax: [phone]          Patient: Carmen Manriquez   MR Number: [id number]   YOB: 1960   Date of Visit: 4/11/2018       Dear Dr. Edwige Addison:    Thank you for referring Carmen Manriquez to me for evaluation. Attached you will find relevant portions of my assessment and plan of care.    If you have questions, please do not hesitate to call me. I look forward to following Carmen Manriquez along with you.    Sincerely,    Zoran Up MD    Enclosure  CC:  No Recipients    If you would like to receive this communication electronically, please contact [email or web address] or [phone] to request more information on [employer] Link access.    For providers and/or their staff who would like to refer a patient to [facility], please contact us through our one-stop-shop provider referral line, [facility], at [phone].    If you feel you have received this communication in error or would no longer like to receive these types of communications, please e-mail [email or web address]

## 2018-04-13 ENCOUNTER — PATIENT MESSAGE (OUTPATIENT)
Dept: RHEUMATOLOGY | Facility: CLINIC | Age: 58
End: 2018-04-13

## 2018-04-14 LAB — ALDOLASE SERPL-CCNC: 5 U/L

## 2018-04-24 ENCOUNTER — CLINICAL SUPPORT (OUTPATIENT)
Dept: REHABILITATION | Facility: HOSPITAL | Age: 58
End: 2018-04-24
Attending: INTERNAL MEDICINE
Payer: COMMERCIAL

## 2018-04-24 DIAGNOSIS — M25.552 PAIN OF BOTH HIP JOINTS: ICD-10-CM

## 2018-04-24 DIAGNOSIS — M25.551 PAIN OF BOTH HIP JOINTS: ICD-10-CM

## 2018-04-24 PROCEDURE — 97110 THERAPEUTIC EXERCISES: CPT | Mod: PO

## 2018-04-24 PROCEDURE — 97161 PT EVAL LOW COMPLEX 20 MIN: CPT | Mod: PO

## 2018-04-24 NOTE — PLAN OF CARE
OUTPATIENT PHYSICAL THERAPY   PATIENT EVALUATION        Name: Carmen Manriquez  Clinic Number: [id number]        Diagnosis:   Encounter Diagnosis   Name Primary?    Pain of both hip joints      Physician: Zoran Up, *  Treatment Orders: Eval and Treat    Past Medical History:   Diagnosis Date    Abnormal Pap smear     Allergy     Endometriosis, moderate     GERD (gastroesophageal reflux disease)     Heart palpitations      Current Outpatient Prescriptions   Medication Sig    butalbital-acetaminophen-caffeine -40 mg (FIORICET, ESGIC) -40 mg per tablet take 1 tablet by mouth every 4 hours if needed for headache    cyclobenzaprine (FLEXERIL) 10 MG tablet Take 0.5 tablets (5 mg total) by mouth nightly as needed for Muscle spasms. Please dispense brand Flexeril    lorazepam (ATIVAN) 0.5 MG tablet Take 0.5 mg by mouth as needed for Anxiety.    meloxicam (MOBIC) 7.5 MG tablet     TAZORAC 0.05 % Crea cream Apply to face nightly as needed     No current facility-administered medications for this visit.      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 female that presents to [facility] secondary to B hip pain. Carmen states this pain began after hurting her back in Nov/Dec of 2017. She states prior to this point she had been active 2-3x/week exercising. She had to stop exercising due to her pain which resulted in both hips hurting. She has still not resumed exercise because of the pain. Prolonged  sitting and activity bothers the pain. She is unable to lay on her hips due to pain.  She has been taking mobic because of the pain, and she has noticed this has helped. No injections or therapy performed prior. She states she has also noticed a decrease in balance abilities.    Imaging: X-ray reveleased Bilateral hips with pelvis.  Bones are fairly well mineralized.  Mild degenerative change at the SI joints.  Small spurs about the superior lateral aspect of the acetabula..    Pain: current 5/10, worst 7/10, best 3/10, Aching, constant  Aggravating factors: prolonged sitting, walking, standing  Easing factors: mobic and flexoril    Pts goals: to get back to activity without pain      No cultural, environmental, or spiritual barriers identified to treatment or learning.    Objective         Lumbar ROM:  Flexion WNLs  Extension 75% no pain  L SB and L rot 75% pulling in L side of back  R SB and R rot: 75%, no pain    Hip Range of Motion:   Left active Left Passive Right active  Right Passive   Flexion 103 WNLL 115 WNL   Abduction 20 30 23 30   Extension NT NT NT NT   Ext. Rotation WNL NT WNL NT   Int. Rotation 19 NT 35 NT         Lower Extremity Strength  Right LE  Left LE    Knee extension: 5/5 Knee extension: 5/5   Knee flexion: 5/5 Knee flexion: 4+/5   Hip flexion: 4+/5 Hip flexion: 4+/5   Hip Internal Rotation:  4/5 with pain    Hip Internal Rotation: Pain with motion, unable to test      Hip External Rotation: 4/5    Hip External Rotation: 4/5      Hip extension:  (knee bent) 4-/5 Hip extension: 4-/5   Hip abduction: 4/5* pain Hip abduction: 4/5* pain   Hip adduction: 4/5 Hip adduction 4/5       Special Tests:  Bridge Test: positive with bridge without UE  GIOVANI: positive B, tightness in adductors and lateral hip  Scour test: negative B        Flexibility:   Ely's test: R = negative degrees ; L = negative degrees   Popliteal Angle: R = -25 degrees ; L = - degrees   Charlie's test: R = negative ; L =  positive      Palpation: + TTP gluteus medius, minimus at GT and piriformis B, L IT band        PT Evaluation Completed? Yes  Discussed Plan of Care with patient: Yes    TREATMENT x 15 minutes    Bridging with glut isolation 20 x 5s  Pelvic tilt 20 x 5s  Piriformis stretch figure 4 push on knee 3 x 20s  Active hamstring stretch 3 x 20s    HEP Provided: Issued HEP2go handout of above activities to perform at home.  Instructed pt. Regarding: Proper technique with all exercises. Pt demo good understanding of the education provided. Carmen demonstrated good return demonstration of activities.       Assessment     Carmen is a 58 y.o. female referred to outpatient physical therapy with a medical diagnosis of B hip trochanteric bursitis and PT diagnosis of B hip weakness and reduced ROM. Demonstrates impairments including: limitations as described in the problem list. Pt prognosis is Good. Positive prognostic factors include motivation and previous active lifestyle. Negative prognostic factors include fear of worsening condition. Pt will benefit from skilled outpatient physical therapy to address the above stated deficits, provide pt/family education, and to maximize pt's level of independence.     Medical necessity is demonstrated by the following IMPAIRMENTS/PROBLEMS:  weakness, impaired endurance, impaired functional mobility, impaired balance, decreased lower extremity function, pain, decreased ROM and impaired muscle length    History  Co-morbidities and personal factors that may impact the plan of care Examination  Body Structures and Functions, activity limitations and participation restrictions that may impact the plan of care    Clinical Presentation   Co-morbidities:   previous episodes of low back pain, anxiety        Personal Factors:   no deficits Body Regions:   back  lower extremities    Body Systems:    gross symmetry  ROM  strength  gross coordinated movement  transfers  transitions  motor  control            Participation Restrictions:   Difficulty working out, poor positional tolerance     Activity limitations:   Learning and applying knowledge  no deficits    General Tasks and Commands  no deficits    Communication  no deficits    Mobility  walking  driving (bike, car, motorcycle)  prolonged positioning    Self care  no deficits    Domestic Life  no deficits    Interactions/Relationships  no deficits    Life Areas  employment    Community and Social Life  community life  recreation and leisure         stable and uncomplicated                      low   moderate  high Decision Making/ Complexity Score:  low         Rehab Potiential: good    Anticipated Barriers for physical therapy: none    GOALS: Short Term Goals:  4 weeks    - Pt will increase L hip IR ROM by at least 10 degrees in order to show improved functional mobility.  - Pt will increase L hip flexion ROM by at least 10 degrees in order to show improved functional mobility.  - Pt will be able to correctly recruit her core during pelvic tilting activities 10 times for 10 seconds in order to show improved core recruitment and coordination.  - Pt to tolerate HEP to improve ROM and independence with ADL's.    Long Term Goals: 5 weeks    - Pt will resume prior exercise routine 2-3x/week without increased back or hip pain in order to return to PLOF.  - Pt will be able to lay on B hips for greater than 10 minutes without ppain in order to improve positional tolerance.  - Pt will increase B hip extension and abduction strength by 1 ms grade in order to increase tolerance to functional activities and ADLs.  - Pt will have a negative GIOVANI test B in order to show improved muscle extensibility and improve functional mobility.  - Pt to be Independent with updated HEP in order to maintain therapy gains following DC from therapy.    Plan       Recommended Treatment Plan: Pt will be treated by physical therapy 1-2 times a week for 8 weeks for pt education,  HEP, therapeutic exercises, neuromuscular re-education, manual therapy, gait training, and modalities prn to achieve established goals.    Other Recommendations: possible dry needling      Therapist: Ashley Holstein, PT    I CERTIFY THE NEED FOR THESE SERVICES FURNISHED UNDER THIS PLAN OF TREATMENT AND WHILE UNDER MY CARE    Physician's comments: ________________________________________________________________________________________________________________________________________________      Physician's Name: ___________________________________

## 2018-04-27 ENCOUNTER — CLINICAL SUPPORT (OUTPATIENT)
Dept: REHABILITATION | Facility: HOSPITAL | Age: 58
End: 2018-04-27
Attending: INTERNAL MEDICINE
Payer: COMMERCIAL

## 2018-04-27 DIAGNOSIS — M25.562 CHRONIC PAIN OF BOTH KNEES: ICD-10-CM

## 2018-04-27 DIAGNOSIS — M25.552 PAIN OF BOTH HIP JOINTS: Primary | ICD-10-CM

## 2018-04-27 DIAGNOSIS — M25.561 CHRONIC PAIN OF BOTH KNEES: ICD-10-CM

## 2018-04-27 DIAGNOSIS — M25.551 PAIN OF BOTH HIP JOINTS: Primary | ICD-10-CM

## 2018-04-27 DIAGNOSIS — M25.60 JOINT STIFFNESS: ICD-10-CM

## 2018-04-27 DIAGNOSIS — R53.1 WEAKNESS: ICD-10-CM

## 2018-04-27 DIAGNOSIS — G89.29 CHRONIC PAIN OF BOTH KNEES: ICD-10-CM

## 2018-04-27 PROCEDURE — 97110 THERAPEUTIC EXERCISES: CPT | Mod: PO

## 2018-04-27 NOTE — PROGRESS NOTES
Name: Carmen Manriquez  Clinic Number: [id number]  Date of Treatment: 04/27/2018  Diagnosis:   Encounter Diagnoses   Name Primary?    Pain of both hip joints Yes    Weakness     Joint stiffness     Chronic pain of both knees        Physician: Zoran Up, *      Evaluation Date: 4/24/18  Visit # authorized: 20  Authorization period: 12/31/17  Plan of care Expiration: 6/19/18    Time in: 08:00 am   Time Out: 08:45 am   Total Treatment Time: 45 minutes   Billable Time: 45 minutes       Subjective:    Carmen Manriquez reports feeling about the same.  Patient reports their pain to be 2/10 on a 0-10 scale with 0 being no pain and 10 being the worst pain imaginable. Left him greater pain than right. Reports some soreness following last treatment session     Objective    Patient received individual therapy to increase strength, endurance, ROM, flexibility, posture and core stabilization     Carmen Manriquez was instructed in and performed therapeutic exe rcises to develop strength, endurance, ROM and flexibility for    Bridging with glut isolation 20 x 5s  Pelvic tilt 20 x 5 seconds   Piriformis stretch figure 4 push on knee 3 x 20 seconds  Active hamstring stretch 3 x 20 seconds   PB roll ins: x 20 orange   Hook lying hip abduction: 20 x 5 second hold  Hook lying hip adduction with ball: 20 x 5 second hold       Carmen Manriquez received the following manual therapy techniques: Joint mobilizations were applied to the: LLLD for 10 minutes.       Written Home Exercises Provided:  Pt demo good understanding of the education provided. Carmen Manriquez demonstrated good return demonstration of activities.     Assessment:       Pt will continue to benefit from skilled PT intervention. Medical Necessity is demonstrated by:  Unable to participate in daily activities, Continued inability to participate in vocational pursuits, Pain limits function of effected part for some activities, Unable to participate fully in daily activities,  Requires skilled supervision to complete and progress HEP and Weakness.    Patient is making good progress towards established goals.    GOALS: Short Term Goals:  4 weeks     - Pt will increase L hip IR ROM by at least 10 degrees in order to show improved functional mobility.  - Pt will increase L hip flexion ROM by at least 10 degrees in order to show improved functional mobility.  - Pt will be able to correctly recruit her core during pelvic tilting activities 10 times for 10 seconds in order to show improved core recruitment and coordination.  - Pt to tolerate HEP to improve ROM and independence with ADL's.     Long Term Goals: 5 weeks     - Pt will resume prior exercise routine 2-3x/week without increased back or hip pain in order to return to PLOF.  - Pt will be able to lay on B hips for greater than 10 minutes without ppain in order to improve positional tolerance.  - Pt will increase B hip extension and abduction strength by 1 ms grade in order to increase tolerance to functional activities and ADLs.  - Pt will have a negative GIOVANI test B in order to show improved muscle extensibility and improve functional mobility.  - Pt to be Independent with updated HEP in order to maintain therapy gains following DC from therapy.    New/Revised goals: None at this time.       Plan:  Continue with established Plan of Care towards PT goals.

## 2018-04-30 ENCOUNTER — CLINICAL SUPPORT (OUTPATIENT)
Dept: REHABILITATION | Facility: HOSPITAL | Age: 58
End: 2018-04-30
Attending: INTERNAL MEDICINE
Payer: COMMERCIAL

## 2018-04-30 DIAGNOSIS — M25.561 CHRONIC PAIN OF BOTH KNEES: ICD-10-CM

## 2018-04-30 DIAGNOSIS — M25.562 CHRONIC PAIN OF BOTH KNEES: ICD-10-CM

## 2018-04-30 DIAGNOSIS — R53.1 WEAKNESS: ICD-10-CM

## 2018-04-30 DIAGNOSIS — M25.60 JOINT STIFFNESS: ICD-10-CM

## 2018-04-30 DIAGNOSIS — M25.552 PAIN OF BOTH HIP JOINTS: Primary | ICD-10-CM

## 2018-04-30 DIAGNOSIS — G89.29 CHRONIC PAIN OF BOTH KNEES: ICD-10-CM

## 2018-04-30 DIAGNOSIS — M25.551 PAIN OF BOTH HIP JOINTS: Primary | ICD-10-CM

## 2018-04-30 PROCEDURE — 97110 THERAPEUTIC EXERCISES: CPT | Mod: PO

## 2018-04-30 NOTE — PROGRESS NOTES
Name: Caremn Manriquez  Clinic Number: [id number]  Date of Treatment: 04/30/2018  Diagnosis:   Encounter Diagnoses   Name Primary?    Pain of both hip joints Yes    Weakness     Joint stiffness     Chronic pain of both knees        Physician: Zoran Up, *      Evaluation Date: 4/24/18  Visit # authorized: 20  Authorization period: 12/31/17  Plan of care Expiration: 6/19/18    Time in: 08:00 am   Time Out: 08:45 am   Total Treatment Time: 45 minutes   Billable Time: 45 minutes       Subjective:    Carmen Manriquez reports feeling much better overall.   Patient reports their pain to be 2/10 on a 0-10 scale with 0 being no pain and 10 being the worst pain imaginable. Left him greater pain than right. Reports some soreness following last treatment session     Objective    Patient received individual therapy to increase strength, endurance, ROM, flexibility, posture and core stabilization     Carmen Manriquez was instructed in and performed therapeutic exe rcises to develop strength, endurance, ROM and flexibility for    Bridging with glut isolation 20 x 5s  Pelvic tilt 20 x 5 seconds   Piriformis stretch figure 4 push on knee 3 x 20 seconds  Active hamstring stretch 3 x 20 seconds   PB roll ins: x 20 orange   Hook lying hip abduction: 20 x 5 second hold  Hook lying hip adduction with ball: 20 x 5 second hold   Double leg shuttle: 2 x 10 #2 bands       Carmen Manriquez received the following manual therapy techniques: Joint mobilizations were applied to the: LLLD for 10 minutes.       Written Home Exercises Provided: Reviewed current home exercise program   Pt demo good understanding of the education provided. Carmen Manriquez demonstrated good return demonstration of activities.     Assessment:     Patient tolerated therapy session   Pt will continue to benefit from skilled PT intervention. Medical Necessity is demonstrated by:  Unable to participate in daily activities, Continued inability to participate in vocational  pursuits, Pain limits function of effected part for some activities, Unable to participate fully in daily activities, Requires skilled supervision to complete and progress HEP and Weakness.    Patient is making good progress towards established goals.    GOALS: Short Term Goals:  4 weeks     - Pt will increase L hip IR ROM by at least 10 degrees in order to show improved functional mobility.  - Pt will increase L hip flexion ROM by at least 10 degrees in order to show improved functional mobility.  - Pt will be able to correctly recruit her core during pelvic tilting activities 10 times for 10 seconds in order to show improved core recruitment and coordination.  - Pt to tolerate HEP to improve ROM and independence with ADL's.     Long Term Goals: 5 weeks     - Pt will resume prior exercise routine 2-3x/week without increased back or hip pain in order to return to PLOF.  - Pt will be able to lay on B hips for greater than 10 minutes without ppain in order to improve positional tolerance.  - Pt will increase B hip extension and abduction strength by 1 ms grade in order to increase tolerance to functional activities and ADLs.  - Pt will have a negative GIOVANI test B in order to show improved muscle extensibility and improve functional mobility.  - Pt to be Independent with updated HEP in order to maintain therapy gains following DC from therapy.    New/Revised goals: None at this time.       Plan:  Continue with established Plan of Care towards PT goals.

## 2018-05-04 ENCOUNTER — CLINICAL SUPPORT (OUTPATIENT)
Dept: REHABILITATION | Facility: HOSPITAL | Age: 58
End: 2018-05-04
Attending: INTERNAL MEDICINE
Payer: COMMERCIAL

## 2018-05-04 DIAGNOSIS — M25.551 PAIN OF BOTH HIP JOINTS: ICD-10-CM

## 2018-05-04 DIAGNOSIS — M25.552 PAIN OF BOTH HIP JOINTS: ICD-10-CM

## 2018-05-04 PROCEDURE — 97110 THERAPEUTIC EXERCISES: CPT | Mod: PO

## 2018-05-04 PROCEDURE — 97140 MANUAL THERAPY 1/> REGIONS: CPT | Mod: PO

## 2018-05-04 NOTE — PROGRESS NOTES
Name: Carmen Manriquez  Clinic Number: [id number]  Date of Treatment: 05/04/2018  Diagnosis:   Encounter Diagnosis   Name Primary?    Pain of both hip joints        Physician: Zoran Up, *      Evaluation Date: 4/24/18  Visit # authorized: 4/20  Authorization period: 12/31/17  Plan of care Expiration: 6/19/18    Time in: 08:00 am   Time Out: 09:00 am   Total Treatment Time: 60 minutes   Billable Time: 60 minutes       Subjective:    Carmen Manriquez reports slight increase in L hip pain.   Patient reports their pain to be 3/10 on a 0-10 scale with 0 being no pain and 10 being the worst pain imaginable. Left hip greater pain than right.     Objective    Patient received individual therapy to increase strength, endurance, ROM, flexibility, posture and core stabilization     Carmen Manriquez was instructed in and performed therapeutic exe rcises to develop strength, endurance, ROM and flexibility for    Bridging with glut isolation 20 x 5s OTB  Pelvic tilt 20 x 5 seconds + march  Piriformis stretch figure 4 push on knee 3 x 20 seconds  Active hamstring stretch 3 x 20 seconds   PB roll ins: x 20 green theraball with orange theraband TA pulldown   Hook lying hip abduction: 20 x 5 second hold  Hook lying hip adduction with ball: 20 x 5 second hold   Double leg shuttle: 2 x 10 2 black bands and 1 red   Sidelying shuttle x 15 B 1 red band  Standing gluteus medius 2 x 10 B  LTR 10 x 10s      Carmen Manriquez received the following manual therapy techniques: Joint mobilizations were applied to the: LLLD for 10 minutes.       Written Home Exercises Provided: Reviewed current home exercise program   Pt demo good understanding of the education provided. Carmen Manriquez demonstrated good return demonstration of activities.     Assessment:     Patient tolerated advancements in above activities without adverse effects. Pt requires increased verbal and tactile cueing for good core recruitment during activities. Fatigue in L glutes was  increased as compared to R hip following gluteus strengthening activities.  Pt will continue to benefit from skilled PT intervention. Medical Necessity is demonstrated by:  Unable to participate in daily activities, Continued inability to participate in vocational pursuits, Pain limits function of effected part for some activities, Unable to participate fully in daily activities, Requires skilled supervision to complete and progress HEP and Weakness.    Patient is making good progress towards established goals.    GOALS: Short Term Goals:  4 weeks     - Pt will increase L hip IR ROM by at least 10 degrees in order to show improved functional mobility.  - Pt will increase L hip flexion ROM by at least 10 degrees in order to show improved functional mobility.  - Pt will be able to correctly recruit her core during pelvic tilting activities 10 times for 10 seconds in order to show improved core recruitment and coordination.  - Pt to tolerate HEP to improve ROM and independence with ADL's.     Long Term Goals: 5 weeks     - Pt will resume prior exercise routine 2-3x/week without increased back or hip pain in order to return to PLOF.  - Pt will be able to lay on B hips for greater than 10 minutes without ppain in order to improve positional tolerance.  - Pt will increase B hip extension and abduction strength by 1 ms grade in order to increase tolerance to functional activities and ADLs.  - Pt will have a negative GIOVANI test B in order to show improved muscle extensibility and improve functional mobility.  - Pt to be Independent with updated HEP in order to maintain therapy gains following DC from therapy.    New/Revised goals: None at this time.       Plan:  Continue with established Plan of Care towards PT goals.

## 2018-05-11 ENCOUNTER — CLINICAL SUPPORT (OUTPATIENT)
Dept: REHABILITATION | Facility: HOSPITAL | Age: 58
End: 2018-05-11
Attending: INTERNAL MEDICINE
Payer: COMMERCIAL

## 2018-05-11 DIAGNOSIS — M25.552 PAIN OF BOTH HIP JOINTS: Primary | ICD-10-CM

## 2018-05-11 DIAGNOSIS — R53.1 WEAKNESS: ICD-10-CM

## 2018-05-11 DIAGNOSIS — M25.60 JOINT STIFFNESS: ICD-10-CM

## 2018-05-11 DIAGNOSIS — M25.551 PAIN OF BOTH HIP JOINTS: Primary | ICD-10-CM

## 2018-05-11 PROCEDURE — 97110 THERAPEUTIC EXERCISES: CPT | Mod: PO

## 2018-05-11 NOTE — PROGRESS NOTES
Name: Carmen Manriquez  Clinic Number: [id number]  Date of Treatment: 05/11/2018  Diagnosis:   Encounter Diagnoses   Name Primary?    Pain of both hip joints Yes    Weakness     Joint stiffness        Physician: Zoran Up, *      Evaluation Date: 4/24/18  Visit # authorized: 5 / 20  PTA Visit number: 1   Authorization period: 12/31/17  Plan of care Expiration: 6/19/18    Time in: 08:00 am   Time Out: 09:00 am   Total Treatment Time: 60 minutes   Billable Time: 30 minutes       Subjective:    Carmen Manriquez reports slight increase in symptoms.   Patient reports their pain to be 3/10 on a 0-10 scale with 0 being no pain and 10 being the worst pain imaginable. Reports stopping the mobac and reports increased pain.    Objective    Patient received individual therapy to increase strength, endurance, ROM, flexibility, posture and core stabilization     Carmen Manriquez was instructed in and performed therapeutic exe rcises to develop strength, endurance, ROM and flexibility for    Bridging with glut isolation 20 x 5 seconds OTB  Clamshells:  2 x 10 orange   Pelvic tilt 20 x 5 seconds + march  Piriformis stretch figure 4 push on knee 3 x 20 seconds  Active hamstring stretch 3 x 20 seconds   PB roll ins: x 20 green theraball with orange theraband TA pulldown   Hook lying hip abduction: 20 x 5 second hold  Hook lying hip adduction with ball: 20 x 5 second hold   Double leg shuttle: 2 x 10 2 black bands and 1 red   Sidelying shuttle x 15 B 1 red band  Standing gluteus medius 2 x 10 B  LTR 10 x 10s      Carmen Manriquez received the following manual therapy techniques: Joint mobilizations were applied to the: LLLD for 10 minutes.       Written Home Exercises Provided: Reviewed current home exercise program   Pt demo good understanding of the education provided. Carmen Manriquez demonstrated good return demonstration of activities.     Assessment:     Patient tolerated advancements in above activities without adverse effects. Pt  requires increased verbal and tactile cueing for good core recruitment during activities. Fatigue in L glutes was increased as compared to R hip following gluteus strengthening activities.  Pt will continue to benefit from skilled PT intervention. Medical Necessity is demonstrated by:  Unable to participate in daily activities, Continued inability to participate in vocational pursuits, Pain limits function of effected part for some activities, Unable to participate fully in daily activities, Requires skilled supervision to complete and progress HEP and Weakness.    Patient is making good progress towards established goals.    GOALS: Short Term Goals:  4 weeks     - Pt will increase L hip IR ROM by at least 10 degrees in order to show improved functional mobility.  - Pt will increase L hip flexion ROM by at least 10 degrees in order to show improved functional mobility.  - Pt will be able to correctly recruit her core during pelvic tilting activities 10 times for 10 seconds in order to show improved core recruitment and coordination.  - Pt to tolerate HEP to improve ROM and independence with ADL's.     Long Term Goals: 5 weeks     - Pt will resume prior exercise routine 2-3x/week without increased back or hip pain in order to return to PLOF.  - Pt will be able to lay on B hips for greater than 10 minutes without ppain in order to improve positional tolerance.  - Pt will increase B hip extension and abduction strength by 1 ms grade in order to increase tolerance to functional activities and ADLs.  - Pt will have a negative GIOVANI test B in order to show improved muscle extensibility and improve functional mobility.  - Pt to be Independent with updated HEP in order to maintain therapy gains following DC from therapy.    New/Revised goals: None at this time.       Plan:  Continue with established Plan of Care towards PT goals.

## 2018-05-15 ENCOUNTER — CLINICAL SUPPORT (OUTPATIENT)
Dept: REHABILITATION | Facility: HOSPITAL | Age: 58
End: 2018-05-15
Attending: INTERNAL MEDICINE
Payer: COMMERCIAL

## 2018-05-15 DIAGNOSIS — R53.1 WEAKNESS: ICD-10-CM

## 2018-05-15 DIAGNOSIS — M25.551 PAIN OF BOTH HIP JOINTS: Primary | ICD-10-CM

## 2018-05-15 DIAGNOSIS — M25.552 PAIN OF BOTH HIP JOINTS: Primary | ICD-10-CM

## 2018-05-15 DIAGNOSIS — M25.60 JOINT STIFFNESS: ICD-10-CM

## 2018-05-15 PROCEDURE — 97110 THERAPEUTIC EXERCISES: CPT | Mod: PO

## 2018-05-15 NOTE — PROGRESS NOTES
Name: Carmen Manriquez  Clinic Number: [id number]  Date of Treatment: 05/15/2018  Diagnosis:   Encounter Diagnoses   Name Primary?    Pain of both hip joints Yes    Weakness     Joint stiffness        Physician: Zoran Up, *      Evaluation Date: 4/24/18  Visit # authorized: 6 / 20  PTA Visit number: 2  Authorization period: 12/31/17  Plan of care Expiration: 6/19/18    Time in: 08:00 am   Time Out: 09:00 am   Total Treatment Time: 60 minutes   Billable Time: 60 minutes       Subjective:    Carmen Manriquez reports continued increase in symptoms.   Patient reports their pain to be 5/10 on a 0-10 scale with 0 being no pain and 10 being the worst pain imaginable. Reports pain being the same before starting the mobac and therapy sessions.   Objective    Patient received individual therapy to increase strength, endurance, ROM, flexibility, posture and core stabilization     Carmen Manriquez was instructed in and performed therapeutic exe rcises to develop strength, endurance, ROM and flexibility for    Bridging with glut isolation 20 x 5 seconds OTB  Clamshells:  2 x 10 orange   Pelvic tilt 20 x 5 seconds + march  Piriformis stretch figure 4 push on knee 3 x 20 seconds  Active hamstring stretch 3 x 20 seconds   PB roll ins: x 20 green theraball with orange theraband TA pulldown   Hook lying hip abduction: 20 x 5 second hold  Hook lying hip adduction with ball: 20 x 5 second hold   Double leg shuttle: 2 x 10 2 black bands and 1 red   Sidelying shuttle x 15 B 1 red band  Standing gluteus medius 2 x 10 B  LTR 10 x 10s      Carmen Manriquez received the following manual therapy techniques: Joint mobilizations were applied to the: LLLD for 10 minutes.       Written Home Exercises Provided: Reviewed current home exercise program   Pt demo good understanding of the education provided. Carmen Manriquez demonstrated good return demonstration of activities.     Assessment:     Patient tolerated therapy session fairly well. Noted  tightness to hip flexors and piriformis. Will continue to benefit from skilled physical therapy   Pt will continue to benefit from skilled PT intervention. Medical Necessity is demonstrated by:  Unable to participate in daily activities, Continued inability to participate in vocational pursuits, Pain limits function of effected part for some activities, Unable to participate fully in daily activities, Requires skilled supervision to complete and progress HEP and Weakness.    Patient is making good progress towards established goals.    GOALS: Short Term Goals:  4 weeks     - Pt will increase L hip IR ROM by at least 10 degrees in order to show improved functional mobility.  - Pt will increase L hip flexion ROM by at least 10 degrees in order to show improved functional mobility.  - Pt will be able to correctly recruit her core during pelvic tilting activities 10 times for 10 seconds in order to show improved core recruitment and coordination.  - Pt to tolerate HEP to improve ROM and independence with ADL's.     Long Term Goals: 5 weeks     - Pt will resume prior exercise routine 2-3x/week without increased back or hip pain in order to return to PLOF.  - Pt will be able to lay on B hips for greater than 10 minutes without ppain in order to improve positional tolerance.  - Pt will increase B hip extension and abduction strength by 1 ms grade in order to increase tolerance to functional activities and ADLs.  - Pt will have a negative GIOVANI test B in order to show improved muscle extensibility and improve functional mobility.  - Pt to be Independent with updated HEP in order to maintain therapy gains following DC from therapy.    New/Revised goals: None at this time.       Plan:  Continue with established Plan of Care towards PT goals.

## 2018-05-23 NOTE — PROGRESS NOTES
Name: Carmen Manriquez  Clinic Number: [id number]  Date of Treatment: 05/24/2018  Diagnosis:   Encounter Diagnosis   Name Primary?    Pain of both hip joints        Physician: Zoran Up, *      Evaluation Date: 4/24/18  Visit # authorized: 7 / 20  PTA Visit number:   Authorization period: 12/31/17  Plan of care Expiration: 6/19/18    Time in: 08:00 am   Time Out: 09:00 am   Total Treatment Time: 60 minutes   Billable Time: 60 minutes       Subjective:    Carmen Manriquez reports she went back on the mobic because she was hurting really bad. It is taking awhile to kick in.  Patient reports their pain to be 5/10 on a 0-10 scale with 0 being no pain and 10 being the worst pain imaginable. She reports her knees have also  Been hurting. The AM is the worst of all times.    Objective    Patient received individual therapy to increase strength, endurance, ROM, flexibility, posture and core stabilization     Reassess:    Hip Range of Motion:    Left active Left Passive Right active  Right Passive   Flexion 120  WNL   Abduction 20* pain 30 25 30   Extension NT NT NT NT   Ext. Rotation WNL NT WNL NT   Int. Rotation 33 NT 35 NT            Lower Extremity Strength  Right LE   Left LE     Knee extension: 5/5 Knee extension: 5/5   Knee flexion: 5/5 Knee flexion: 4+/5   Hip flexion: 4+/5 Hip flexion: 4+/5   Hip Internal Rotation:  4+/5 with pain    Hip Internal Rotation: 4/5      Hip External Rotation: 4+/5    Hip External Rotation: 4+/5      Hip extension:  (knee bent) 4-/5 Hip extension: 4-/5   Hip abduction: 4/5* pain Hip abduction: 4/5* pain   Hip adduction: 4/5 Hip adduction 4/5      GIOVANI: positive B, tightness in adductors and lateral hip       Carmen Manriquez was instructed in and performed therapeutic exe rcises to develop strength, endurance, ROM and flexibility for 40 minutes    Bridging with glut isolation 20 x 5 seconds OTB  Clamshells:  2 x 10 orange (NP)  Pelvic tilt 20 x 5 seconds + march  Piriformis  stretch figure 4 push on knee 3 x 20 seconds  Active hamstring stretch 3 x 20 seconds   sidelying quad stretch 3 x 30s  Hip flexor stretch 3 x 30s  PB roll ins: x 20 green theraball with orange theraband TA pulldown   Hook lying hip abduction: 20 x 5 second hold  Hook lying hip adduction with ball: 20 x 5 second hold   Double leg shuttle: 2 x 10 2 black bands and 1 red (NP)  Sidelying shuttle x 15 B 1 red band (NP)  Standing gluteus medius 2 x 10 B (NP)  LTR 10 x 10s      Carmen NATASHA Declan received the following manual therapy techniques: Joint mobilizations were applied to the B hips x 15 min  LLLD for 10 minutes  Lateral hip distraction B  Stick roll to quads and IT band bilateral      Written Home Exercises Provided: Reviewed current home exercise program   Pt demo good understanding of the education provided. Carmen KAUR Declan demonstrated good return demonstration of activities.     Assessment:     Patient has met all STGs at this time with improvements in L hip ROM noted as well as improved core recruitment. She is, however, reporting no change in pain and discomfort and resumption of pain in B knees and back. Pt educated to make a follow up with her MD regarding continued pain and dysfunction. Will continue with therapy at this time due to progress being made towards goals.  Strengthening activities reduced today due to increased hip pain. Issued an updated HEP today to continue to address B hip ROM and strength.  Pt will continue to benefit from skilled PT intervention. Medical Necessity is demonstrated by:  Unable to participate in daily activities, Continued inability to participate in vocational pursuits, Pain limits function of effected part for some activities, Unable to participate fully in daily activities, Requires skilled supervision to complete and progress HEP and Weakness.    Patient is making good progress towards established goals.    GOALS: Short Term Goals:  4 weeks     - Pt will increase L hip IR  ROM by at least 10 degrees in order to show improved functional mobility. Met 5/24/18  - Pt will increase L hip flexion ROM by at least 10 degrees in order to show improved functional mobility. Met 5/24/18  - Pt will be able to correctly recruit her core during pelvic tilting activities 10 times for 10 seconds in order to show improved core recruitment and coordination. Met 5/24/18  - Pt to tolerate HEP to improve ROM and independence with ADL's. Met 5/24/18     Long Term Goals: 5 weeks     - Pt will resume prior exercise routine 2-3x/week without increased back or hip pain in order to return to PLOF.  - Pt will be able to lay on B hips for greater than 10 minutes without ppain in order to improve positional tolerance.  - Pt will increase B hip extension and abduction strength by 1 ms grade in order to increase tolerance to functional activities and ADLs.  - Pt will have a negative GIOVANI test B in order to show improved muscle extensibility and improve functional mobility.  - Pt to be Independent with updated HEP in order to maintain therapy gains following DC from therapy.    New/Revised goals: None at this time.       Plan:  Continue with established Plan of Care towards PT goals.

## 2018-05-24 ENCOUNTER — CLINICAL SUPPORT (OUTPATIENT)
Dept: REHABILITATION | Facility: HOSPITAL | Age: 58
End: 2018-05-24
Attending: INTERNAL MEDICINE
Payer: COMMERCIAL

## 2018-05-24 DIAGNOSIS — M25.551 PAIN OF BOTH HIP JOINTS: ICD-10-CM

## 2018-05-24 DIAGNOSIS — M25.552 PAIN OF BOTH HIP JOINTS: ICD-10-CM

## 2018-05-24 PROCEDURE — 97110 THERAPEUTIC EXERCISES: CPT | Mod: PO

## 2018-05-24 PROCEDURE — 97140 MANUAL THERAPY 1/> REGIONS: CPT | Mod: PO

## 2018-06-01 ENCOUNTER — CLINICAL SUPPORT (OUTPATIENT)
Dept: REHABILITATION | Facility: HOSPITAL | Age: 58
End: 2018-06-01
Attending: INTERNAL MEDICINE
Payer: COMMERCIAL

## 2018-06-01 DIAGNOSIS — M25.551 PAIN OF BOTH HIP JOINTS: ICD-10-CM

## 2018-06-01 DIAGNOSIS — M25.552 PAIN OF BOTH HIP JOINTS: ICD-10-CM

## 2018-06-01 PROCEDURE — 97140 MANUAL THERAPY 1/> REGIONS: CPT | Mod: PO

## 2018-06-01 PROCEDURE — 97110 THERAPEUTIC EXERCISES: CPT | Mod: PO

## 2018-06-01 NOTE — PROGRESS NOTES
"Name: Carmen Manriquez  Clinic Number: [id number]  Date of Treatment: 06/01/2018  Diagnosis:   Encounter Diagnosis   Name Primary?    Pain of both hip joints        Physician: Zoran Up, *      Evaluation Date: 4/24/18  Visit # authorized: 8 / 20  PTA Visit number:   Authorization period: 12/31/17  Plan of care Expiration: 6/19/18    Time in: 08:00 am   Time Out: 08:55 am   Total Treatment Time: 55 minutes   Billable Time: 55 minutes       Subjective:    Carmen Manriquez reports the mobic has not really helped at this point..  Patient reports their pain to be 5/10 on a 0-10 scale with 0 being no pain and 10 being the worst pain imaginable. "Mornings are really terrible." Her shower helps loosen it up.    Objective    Patient received individual therapy to increase strength, endurance, ROM, flexibility, posture and core stabilization       Carmen Manriquez was instructed in and performed therapeutic exe rcises to develop strength, endurance, ROM and flexibility for 30 minutes    Bridging with glut isolation 20 x 5 seconds OTB  Prone quad stretch 3 x 30s  Glut squeeze x 15 unilateral  Adductor stretch 3 x 30s with therapist assist  LTR 10 x 10s activating obliques not letting hips come off of table  Pelvic tilt 20 x 5 seconds + hip fall out  Piriformis stretch knee to opposite shoulder 3 x 20 seconds  Active hamstring stretch 3 x 20 seconds   Hip flexor stretch 3 x 30s  Hook lying hip abduction: 20 x 5 second hold  Hook lying hip adduction with ball: 20 x 5 second hold   Double leg shuttle: 2 x 10 2 black bands and 1 red (NP)  Sidelying shuttle x 15 B 1 red band (NP)  Standing gluteus medius 2 x 10 B (NP)  Clamshells:  2 x 10 orange (NP)  PB roll ins: x 20 green theraball with orange theraband TA pulldown (NP)         Carmen Manriquez received the following manual therapy techniques: Joint mobilizations were applied to the B hips x 25 min  LLLD for 10 minutes  Lateral hip distraction B  Stick roll to quads and IT band " bilateral      Written Home Exercises Provided: Reviewed current home exercise program   Pt demo good understanding of the education provided. Carmen KAUR Declan demonstrated good return demonstration of activities.     Assessment:     Discussed trial with trigger point dry needling to address continued restriction in quads, hip flexors, and IT band bilaterally. Continued with reduced exercises today due to an increase in B hip pain and inability to tolerate strengthening activities at this time. Pt has difficulty isolating glutes; therefore, exercises were focused on activation and coordination of glutes today with verbal and tactile cueing to facilitate. Will continue with therapy at this time due to progress being made towards goals.  Strengthening activities reduced today due to increased hip pain. Issued an updated HEP today to continue to address B hip ROM and strength.  Pt will continue to benefit from skilled PT intervention. Medical Necessity is demonstrated by:  Unable to participate in daily activities, Continued inability to participate in vocational pursuits, Pain limits function of effected part for some activities, Unable to participate fully in daily activities, Requires skilled supervision to complete and progress HEP and Weakness.    Patient is making good progress towards established goals.    GOALS: Short Term Goals:  4 weeks     - Pt will increase L hip IR ROM by at least 10 degrees in order to show improved functional mobility. Met 5/24/18  - Pt will increase L hip flexion ROM by at least 10 degrees in order to show improved functional mobility. Met 5/24/18  - Pt will be able to correctly recruit her core during pelvic tilting activities 10 times for 10 seconds in order to show improved core recruitment and coordination. Met 5/24/18  - Pt to tolerate HEP to improve ROM and independence with ADL's. Met 5/24/18     Long Term Goals: 5 weeks     - Pt will resume prior exercise routine 2-3x/week without  increased back or hip pain in order to return to PLOF.  - Pt will be able to lay on B hips for greater than 10 minutes without ppain in order to improve positional tolerance.  - Pt will increase B hip extension and abduction strength by 1 ms grade in order to increase tolerance to functional activities and ADLs.  - Pt will have a negative GIOVANI test B in order to show improved muscle extensibility and improve functional mobility.  - Pt to be Independent with updated HEP in order to maintain therapy gains following DC from therapy.    New/Revised goals: None at this time.       Plan:  Continue with established Plan of Care towards PT goals.

## 2018-06-07 ENCOUNTER — CLINICAL SUPPORT (OUTPATIENT)
Dept: REHABILITATION | Facility: HOSPITAL | Age: 58
End: 2018-06-07
Attending: INTERNAL MEDICINE
Payer: COMMERCIAL

## 2018-06-07 DIAGNOSIS — M25.552 PAIN OF BOTH HIP JOINTS: ICD-10-CM

## 2018-06-07 DIAGNOSIS — M25.551 PAIN OF BOTH HIP JOINTS: ICD-10-CM

## 2018-06-07 PROCEDURE — 97110 THERAPEUTIC EXERCISES: CPT | Mod: PO

## 2018-06-07 PROCEDURE — 97140 MANUAL THERAPY 1/> REGIONS: CPT | Mod: PO

## 2018-06-07 NOTE — PROGRESS NOTES
Name: Carmen Manriquez  Clinic Number: [id number]  Date of Treatment: 06/07/2018  Diagnosis:   Encounter Diagnosis   Name Primary?    Pain of both hip joints        Physician: Zoran Up, *      Evaluation Date: 4/24/18  Visit # authorized: 8 / 20  PTA Visit number:   Authorization period: 12/31/17  Plan of care Expiration: 6/19/18    Time in: 08:00 am   Time Out:0900 am   Total Treatment Time: 60 minutes   Billable Time: 60 minutes       Subjective:    Carmen Manriquez reports an improvement in symptoms since last session. She has been doing mostly stretching exercises before and after work.  Patient reports their pain to be 2/10 on a 0-10 scale with 0 being no pain and 10 being the worst pain imaginable.  She is on week three of mobic.    Objective    Patient received individual therapy to increase strength, endurance, ROM, flexibility, posture and core stabilization       Carmen Manriquez was instructed in and performed therapeutic exe rcises to develop strength, endurance, ROM and flexibility for 40 minutes    Hip flexor stretch 3 x 30s  Bridging with glut isolation 20 x 5 seconds OTB  Prone quad stretch 3 x 30s  Glut squeeze x 15 unilateral  Adductor stretch 3 x 30s with therapist assist  LTR 10 x 10s activating obliques not letting hips come off of table  Pelvic tilt 20 x 5 seconds + hip fall out  Piriformis stretch knee to opposite shoulder 3 x 20 seconds  Active hamstring stretch 3 x 20 seconds   Hook lying hip abduction: 20 x 5 second hold  Hook lying hip adduction with ball: 20 x 5 second hold   Double leg shuttle: 2 x 10 2 black bands and 1 red (NP)  Sidelying shuttle x 15 B 1 red band (NP)  Standing gluteus medius 2 x 10 B (NP)  Clamshells:  2 x 10 orange (NP)  PB roll ins: x 20 green theraball with orange theraband TA pulldown (NP)         Carmen Manriquez received the following manual therapy techniques: Joint mobilizations were applied to the B hips x 20 min  LLLD for 10 minutes  Lateral hip distraction  B  Stick roll to quads and IT band bilateral  Patient provided written and verbal consent to receive functional dry needling at today's visit (see consent form scanned into chart). FDN performed to B IT bands, VL, and TFL. FDN performed to reduce pain and muscle tension, promote blood flow, and improve ROM and function x 10 minutes. Pt tolerated tx well without adverse effects. She was educated on what to expect following the procedure and she verbalized understanding.        Written Home Exercises Provided: Reviewed current home exercise program   Pt demo good understanding of the education provided. Carmen Manriquez demonstrated good return demonstration of activities.     Assessment:     Pt doing well with modified treatment session consisting of gentle stretching and glute strengthening at this time. Performed trial of dry needling today, and she responded well without adverse effects. She was educated on what to expect following tx session. She was also issued an updated HEP consisting of stretches as listed above with instructions to perform at least 2x per day. Discussed taking standing and walking breaks at work one time an hour to prevent pain and stiffness with prolonged sitting. Pt agreeable to plan.  Pt will continue to benefit from skilled PT intervention. Medical Necessity is demonstrated by:  Unable to participate in daily activities, Continued inability to participate in vocational pursuits, Pain limits function of effected part for some activities, Unable to participate fully in daily activities, Requires skilled supervision to complete and progress HEP and Weakness.    Patient is making good progress towards established goals.    GOALS: Short Term Goals:  4 weeks     - Pt will increase L hip IR ROM by at least 10 degrees in order to show improved functional mobility. Met 5/24/18  - Pt will increase L hip flexion ROM by at least 10 degrees in order to show improved functional mobility. Met 5/24/18  -  Pt will be able to correctly recruit her core during pelvic tilting activities 10 times for 10 seconds in order to show improved core recruitment and coordination. Met 5/24/18  - Pt to tolerate HEP to improve ROM and independence with ADL's. Met 5/24/18     Long Term Goals: 5 weeks     - Pt will resume prior exercise routine 2-3x/week without increased back or hip pain in order to return to PLOF.  - Pt will be able to lay on B hips for greater than 10 minutes without ppain in order to improve positional tolerance.  - Pt will increase B hip extension and abduction strength by 1 ms grade in order to increase tolerance to functional activities and ADLs.  - Pt will have a negative GIOVANI test B in order to show improved muscle extensibility and improve functional mobility.  - Pt to be Independent with updated HEP in order to maintain therapy gains following DC from therapy.    New/Revised goals: None at this time.       Plan:  Continue with established Plan of Care towards PT goals.

## 2018-06-12 ENCOUNTER — CLINICAL SUPPORT (OUTPATIENT)
Dept: REHABILITATION | Facility: HOSPITAL | Age: 58
End: 2018-06-12
Attending: INTERNAL MEDICINE
Payer: COMMERCIAL

## 2018-06-12 DIAGNOSIS — R53.1 WEAKNESS: ICD-10-CM

## 2018-06-12 DIAGNOSIS — M25.552 PAIN OF BOTH HIP JOINTS: Primary | ICD-10-CM

## 2018-06-12 DIAGNOSIS — M25.551 PAIN OF BOTH HIP JOINTS: Primary | ICD-10-CM

## 2018-06-12 DIAGNOSIS — M25.60 JOINT STIFFNESS: ICD-10-CM

## 2018-06-12 PROCEDURE — 97140 MANUAL THERAPY 1/> REGIONS: CPT | Mod: PO

## 2018-06-12 PROCEDURE — 97110 THERAPEUTIC EXERCISES: CPT | Mod: PO

## 2018-06-12 NOTE — PROGRESS NOTES
Name: Carmen Manriquez  Clinic Number: [id number]  Date of Treatment: 06/12/2018  Diagnosis:   Encounter Diagnoses   Name Primary?    Pain of both hip joints Yes    Weakness     Joint stiffness        Physician: Zoran Up, *      Evaluation Date: 4/24/18  Visit # authorized: 9 / 20  PTA Visit number: 1  Authorization period: 12/31/17  Plan of care Expiration: 6/19/18    Time in: 08:00 am   Time Out:0900 am   Total Treatment Time: 60 minutes   Billable Time: 60 minutes       Subjective:    Carmen Manriquez reports continued hip and low back pain. Left greater than right.   Patient reports their pain to be 2/10 on a 0-10 scale with 0 being no pain and 10 being the worst pain imaginable.  She is on week three of mobic.    Objective    Patient received individual therapy to increase strength, endurance, ROM, flexibility, posture and core stabilization       Carmen Manriquez was instructed in and performed therapeutic exe rcises to develop strength, endurance, ROM and flexibility for 40 minutes    Hip flexor stretch 3 x 30 seconds   Bridging with glut isolation 20 x 5 seconds GTB  Prone quad stretch 3 x 30 seconds   Glut squeeze x 15 unilateral  Adductor stretch 3 x 30s with therapist assist  LTR 10 x 10s activating obliques not letting hips come off of table  Pelvic tilt 20 x 5 seconds + hip fall out  Piriformis stretch knee to opposite shoulder 3 x 20 seconds  Active hamstring stretch 3 x 20 seconds   Hook lying hip abduction: 20 x 5 second hold  Hook lying hip adduction with ball: 20 x 5 second hold   Double leg shuttle: 2 x 10 2 black bands and 1 red  (NP)  Sidelying shuttle x 15 B 1 red band (NP)  Standing gluteus medius 2 x 10 B (NP)  Clamshells:  2 x 10 orange (NP)  PB roll ins: x 20 green theraball with orange theraband TA pulldown (NP)         Carmen Manriquez received the following manual therapy techniques: Joint mobilizations were applied to the B hips x 20 min  LLLD for 10 minutes  Lateral hip distraction  B  Stick roll to quads and IT band bilateral  Patient provided written and verbal consent to receive functional dry needling at today's visit (see consent form scanned into chart). FDN performed to B IT bands, VL, and TFL. FDN performed to reduce pain and muscle tension, promote blood flow, and improve ROM and function x 10 minutes. Pt tolerated tx well without adverse effects. She was educated on what to expect following the procedure and she verbalized understanding.- Dry needling not performed today        Written Home Exercises Provided: Reviewed current home exercise program   Pt demo good understanding of the education provided. Carmen Manriquez demonstrated good return demonstration of activities.     Assessment:     Patient tolerated therapy session well. Michael  Pt will continue to benefit from skilled PT intervention. Medical Necessity is demonstrated by:  Unable to participate in daily activities, Continued inability to participate in vocational pursuits, Pain limits function of effected part for some activities, Unable to participate fully in daily activities, Requires skilled supervision to complete and progress HEP and Weakness.    Patient is making good progress towards established goals.    GOALS: Short Term Goals:  4 weeks     - Pt will increase L hip IR ROM by at least 10 degrees in order to show improved functional mobility. Met 5/24/18  - Pt will increase L hip flexion ROM by at least 10 degrees in order to show improved functional mobility. Met 5/24/18  - Pt will be able to correctly recruit her core during pelvic tilting activities 10 times for 10 seconds in order to show improved core recruitment and coordination. Met 5/24/18  - Pt to tolerate HEP to improve ROM and independence with ADL's. Met 5/24/18     Long Term Goals: 5 weeks     - Pt will resume prior exercise routine 2-3x/week without increased back or hip pain in order to return to PLOF.  - Pt will be able to lay on B hips for greater than  10 minutes without ppain in order to improve positional tolerance.  - Pt will increase B hip extension and abduction strength by 1 ms grade in order to increase tolerance to functional activities and ADLs.  - Pt will have a negative GIOVANI test B in order to show improved muscle extensibility and improve functional mobility.  - Pt to be Independent with updated HEP in order to maintain therapy gains following DC from therapy.    New/Revised goals: None at this time.       Plan:  Continue with established Plan of Care towards PT goals.

## 2018-06-21 ENCOUNTER — CLINICAL SUPPORT (OUTPATIENT)
Dept: REHABILITATION | Facility: HOSPITAL | Age: 58
End: 2018-06-21
Attending: INTERNAL MEDICINE
Payer: COMMERCIAL

## 2018-06-21 DIAGNOSIS — M25.552 PAIN OF BOTH HIP JOINTS: Primary | ICD-10-CM

## 2018-06-21 DIAGNOSIS — M25.562 CHRONIC PAIN OF BOTH KNEES: ICD-10-CM

## 2018-06-21 DIAGNOSIS — R53.1 WEAKNESS: ICD-10-CM

## 2018-06-21 DIAGNOSIS — M25.561 CHRONIC PAIN OF BOTH KNEES: ICD-10-CM

## 2018-06-21 DIAGNOSIS — G89.29 CHRONIC PAIN OF BOTH KNEES: ICD-10-CM

## 2018-06-21 DIAGNOSIS — M25.60 JOINT STIFFNESS: ICD-10-CM

## 2018-06-21 DIAGNOSIS — M25.551 PAIN OF BOTH HIP JOINTS: Primary | ICD-10-CM

## 2018-06-21 PROCEDURE — 97110 THERAPEUTIC EXERCISES: CPT | Mod: PO

## 2018-06-21 NOTE — PROGRESS NOTES
Name: Carmen Manriquez  Clinic Number: [id number]  Date of Treatment: 06/21/2018  Diagnosis:   Encounter Diagnoses   Name Primary?    Pain of both hip joints Yes    Weakness     Chronic pain of both knees     Joint stiffness        Physician: Zoran Up, *      Evaluation Date: 4/24/18  Visit # authorized: 10  / 20  PTA Visit number: 2  Authorization period: 12/31/17  Plan of care Expiration: 6/19/18    Time in: 08:00 am   Time Out:0900 am   Total Treatment Time: 60 minutes   Billable Time: 60 minutes       Subjective:    Carmen Manriquez reports continued hip and low back pain. Left greater than right, states she  Patient reports their pain to be 2/10 on a 0-10 scale with 0 being no pain and 10 being the worst pain imaginable.      Objective    Patient received individual therapy to increase strength, endurance, ROM, flexibility, posture and core stabilization       Carmen Manriquez was instructed in and performed therapeutic exe rcises to develop strength, endurance, ROM and flexibility for 40 minutes    Hip flexor stretch 3 x 30 seconds   Bridging with glut isolation 20 x 5 seconds GTB  Prone quad stretch 3 x 30 seconds   Glut squeeze x 15 unilateral  Adductor stretch 3 x 30s with therapist assist  LTR 10 x 10s activating obliques not letting hips come off of table  Pelvic tilt 20 x 5 seconds + hip fall out  Piriformis stretch knee to opposite shoulder 3 x 20 seconds  Active hamstring stretch 3 x 20 seconds   Hook lying hip abduction: 20 x 5 second hold  Hook lying hip adduction with ball: 20 x 5 second hold   Double leg shuttle: 2 x 10 2 black bands and 1 red  (NP)  Sidelying shuttle x 15 B 1 red band (NP)  Standing gluteus medius 2 x 10 B (NP)  Clamshells:  2 x 10 orange (NP)  PB roll ins: x 20 green theraball with orange theraband TA pulldown (NP)         Carmen Manriquez received the following manual therapy techniques: Joint mobilizations were applied to the B hips x 20 min  LLLD for 10 minutes  Lateral hip  distraction B  Stick roll to quads and IT band bilateral  Patient provided written and verbal consent to receive functional dry needling at today's visit (see consent form scanned into chart). FDN performed to B IT bands, VL, and TFL. FDN performed to reduce pain and muscle tension, promote blood flow, and improve ROM and function x 10 minutes. Pt tolerated tx well without adverse effects. She was educated on what to expect following the procedure and she verbalized understanding.- Dry needling not performed today        Written Home Exercises Provided: Reviewed current home exercise program   Pt demo good understanding of the education provided. Carmen KAUR Declan demonstrated good return demonstration of activities.     Assessment:     Patient tolerated therapy session well. Remians limited with progress due to continued pain and symptoms. Sees MD in Sept.   Pt will continue to benefit from skilled PT intervention. Medical Necessity is demonstrated by:  Unable to participate in daily activities, Continued inability to participate in vocational pursuits, Pain limits function of effected part for some activities, Unable to participate fully in daily activities, Requires skilled supervision to complete and progress HEP and Weakness.    Patient is making good progress towards established goals.    GOALS: Short Term Goals:  4 weeks     - Pt will increase L hip IR ROM by at least 10 degrees in order to show improved functional mobility. Met 5/24/18  - Pt will increase L hip flexion ROM by at least 10 degrees in order to show improved functional mobility. Met 5/24/18  - Pt will be able to correctly recruit her core during pelvic tilting activities 10 times for 10 seconds in order to show improved core recruitment and coordination. Met 5/24/18  - Pt to tolerate HEP to improve ROM and independence with ADL's. Met 5/24/18     Long Term Goals: 5 weeks     - Pt will resume prior exercise routine 2-3x/week without increased back or  hip pain in order to return to PLOF.  - Pt will be able to lay on B hips for greater than 10 minutes without ppain in order to improve positional tolerance.  - Pt will increase B hip extension and abduction strength by 1 ms grade in order to increase tolerance to functional activities and ADLs.  - Pt will have a negative GIOVANI test B in order to show improved muscle extensibility and improve functional mobility.  - Pt to be Independent with updated HEP in order to maintain therapy gains following DC from therapy.    New/Revised goals: None at this time.       Plan:  Continue with established Plan of Care towards PT goals.

## 2018-06-28 ENCOUNTER — CLINICAL SUPPORT (OUTPATIENT)
Dept: REHABILITATION | Facility: HOSPITAL | Age: 58
End: 2018-06-28
Attending: INTERNAL MEDICINE
Payer: COMMERCIAL

## 2018-06-28 DIAGNOSIS — M25.551 PAIN OF BOTH HIP JOINTS: ICD-10-CM

## 2018-06-28 DIAGNOSIS — M25.552 PAIN OF BOTH HIP JOINTS: ICD-10-CM

## 2018-06-28 PROCEDURE — 97140 MANUAL THERAPY 1/> REGIONS: CPT | Mod: PO

## 2018-06-28 PROCEDURE — 97110 THERAPEUTIC EXERCISES: CPT | Mod: PO

## 2018-06-28 NOTE — PROGRESS NOTES
Name: Carmen Manriquez  Clinic Number: [id number]  Date of Treatment: 06/28/2018  Diagnosis:   Encounter Diagnosis   Name Primary?    Pain of both hip joints        Physician: Zoran Up, *      Evaluation Date: 4/24/18  Visit # authorized: 12 / 20  PTA Visit number:   Authorization period: 12/31/17  Plan of care Expiration: 6/19/18    Time in: 08:00 am   Time Out:0900 am   Total Treatment Time: 60 minutes   Billable Time: 30 minutes       Subjective:    Carmen Manriquez reports continued her R hip is feeling much better.Left hip is still hurting more than her R.  Patient reports their pain to be 1/10 on a 0-10 scale with 0 being no pain and 10 being the worst pain imaginable.      Objective    Patient received individual therapy to increase strength, endurance, ROM, flexibility, posture and core stabilization       Carmen Manriquez was instructed in and performed therapeutic exe rcises to develop strength, endurance, ROM and flexibility for 40 minutes    Hip flexor stretch 3 x 30 seconds   Bridging with glut isolation 20 x 5 seconds GTB  Prone quad stretch 3 x 30 seconds   Glut squeeze x 15 unilateral  Adductor stretch 3 x 30s with therapist assist  Dead bug x 15  Piriformis stretch knee to opposite shoulder 3 x 20 seconds  Active hamstring stretch 3 x 20 seconds   Hook lying hip abduction: 20 x 5 second hold  Hook lying hip adduction with ball: 20 x 5 second hold   Double leg shuttle: 2 x 10 2 black bands and 1 red  (NP)  Sidelying shuttle x 15 B 1 red band (NP)  Standing gluteus medius 2 x 10 B (NP)  Clamshells:  2 x 10 orange (NP)  PB roll ins: x 20 green theraball with orange theraband TA pulldown (NP)   LTR 10 x 10s activating obliques not letting hips come off of table (NP)    Carmen Manriquez received the following manual therapy techniques: Joint mobilizations were applied to the B hips x 20 min  LLLD for 10 minutes  Lateral hip distraction B  Stick roll to quads and IT band bilateral  Patient provided  written and verbal consent to receive functional dry needling at today's visit (see consent form scanned into chart). FDN performed to B IT bands, VL, and TFL. FDN performed to reduce pain and muscle tension, promote blood flow, and improve ROM and function x 10 minutes. Pt tolerated tx well without adverse effects. She was educated on what to expect following the procedure and she verbalized understanding      Written Home Exercises Provided: Reviewed current home exercise program   Pt demo good understanding of the education provided. Carmen KAUR Declan demonstrated good return demonstration of activities.     Assessment:     Pt with continued muscular restrictions in B hip flexors and quads; however, she is unable to tolerate stretching activities due to back and knee pain. Her HEP was discussed today, and she is to perform all stretching activities and gentle strengthening activities as bolded above. She is to hold on clams and side steps as they increase her pain. Pt remains limited with PT at this time due to continued symptoms. Pt following up with MD in September as this is the earliest appt she can get.  Pt will continue to benefit from skilled PT intervention. Medical Necessity is demonstrated by:  Unable to participate in daily activities, Continued inability to participate in vocational pursuits, Pain limits function of effected part for some activities, Unable to participate fully in daily activities, Requires skilled supervision to complete and progress HEP and Weakness.    Patient is making good progress towards established goals.    GOALS: Short Term Goals:  4 weeks     - Pt will increase L hip IR ROM by at least 10 degrees in order to show improved functional mobility. Met 5/24/18  - Pt will increase L hip flexion ROM by at least 10 degrees in order to show improved functional mobility. Met 5/24/18  - Pt will be able to correctly recruit her core during pelvic tilting activities 10 times for 10 seconds  in order to show improved core recruitment and coordination. Met 5/24/18  - Pt to tolerate HEP to improve ROM and independence with ADL's. Met 5/24/18     Long Term Goals: 5 weeks     - Pt will resume prior exercise routine 2-3x/week without increased back or hip pain in order to return to PLOF.  - Pt will be able to lay on B hips for greater than 10 minutes without ppain in order to improve positional tolerance.  - Pt will increase B hip extension and abduction strength by 1 ms grade in order to increase tolerance to functional activities and ADLs.  - Pt will have a negative GIOVANI test B in order to show improved muscle extensibility and improve functional mobility.  - Pt to be Independent with updated HEP in order to maintain therapy gains following DC from therapy.    New/Revised goals: None at this time.       Plan:  Continue with established Plan of Care towards PT goals.

## 2018-07-11 ENCOUNTER — CLINICAL SUPPORT (OUTPATIENT)
Dept: REHABILITATION | Facility: HOSPITAL | Age: 58
End: 2018-07-11
Attending: INTERNAL MEDICINE
Payer: COMMERCIAL

## 2018-07-11 DIAGNOSIS — M25.551 PAIN OF BOTH HIP JOINTS: Primary | ICD-10-CM

## 2018-07-11 DIAGNOSIS — M25.552 PAIN OF BOTH HIP JOINTS: Primary | ICD-10-CM

## 2018-07-11 DIAGNOSIS — M25.562 CHRONIC PAIN OF BOTH KNEES: ICD-10-CM

## 2018-07-11 DIAGNOSIS — M25.561 CHRONIC PAIN OF BOTH KNEES: ICD-10-CM

## 2018-07-11 DIAGNOSIS — R53.1 WEAKNESS: ICD-10-CM

## 2018-07-11 DIAGNOSIS — G89.29 CHRONIC PAIN OF BOTH KNEES: ICD-10-CM

## 2018-07-11 DIAGNOSIS — M25.60 JOINT STIFFNESS: ICD-10-CM

## 2018-07-11 PROCEDURE — 97110 THERAPEUTIC EXERCISES: CPT | Mod: PO

## 2018-07-11 NOTE — PROGRESS NOTES
Name: Carmen Manriquez  Clinic Number: [id number]  Date of Treatment: 07/11/2018  Diagnosis:   Encounter Diagnoses   Name Primary?    Pain of both hip joints Yes    Weakness     Joint stiffness     Chronic pain of both knees        Physician: Zoran Up, *      Evaluation Date: 4/24/18  Visit # authorized: 13 / 20  PTA Visit number: 1  Authorization period: 12/31/17  Plan of care Expiration: 6/19/18    Time in: 08:00 am   Time Out:0900 am   Total Treatment Time: 60 minutes   Billable Time: 45  minutes       Subjective:    Carmen Manriquez reports no joint pain but states her hips and quads feel tightness and she has constant muscle pain and difficulty sleeping .  Patient reports their pain to be 3 /10 on a 0-10 scale with 0 being no pain and 10 being the worst pain imaginable.      Objective    Patient received individual therapy to increase strength, endurance, ROM, flexibility, posture and core stabilization       Carmen Manriquez was instructed in and performed therapeutic exe rcises to develop strength, endurance, ROM and flexibility for 40 minutes    Hip flexor stretch 3 x 30 seconds   Bridging with glut isolation 20 x 5 seconds GTB  Prone quad stretch 3 x 30 seconds   Glut squeeze x 15 unilateral  Adductor stretch 3 x 30s with therapist assist  Dead bug x 15  Piriformis stretch knee to opposite shoulder 3 x 20 seconds  Active hamstring stretch 3 x 20 seconds   Hook lying hip abduction: 20 x 5 second hold  Hook lying hip adduction with ball: 20 x 5 second hold     Double leg shuttle: 2 x 10 2 black bands and 1 red  (NP)  Sidelying shuttle x 15 B 1 red band (NP)  Standing gluteus medius 2 x 10 B (NP)  Clamshells:  2 x 10 orange (NP)  PB roll ins: x 20 green theraball with orange theraband TA pulldown (NP)   LTR 10 x 10s activating obliques not letting hips come off of table (NP)    Carmen Manriquez received the following manual therapy techniques: Joint mobilizations were applied to the B hips x 20 min    LLLD  for 10 minutes  Lateral hip distraction B  Stick roll to quads and IT band bilateral    Patient provided written and verbal consent to receive functional dry needling at today's visit (see consent form scanned into chart). FDN performed to B IT bands, VL, and TFL. FDN performed to reduce pain and muscle tension, promote blood flow, and improve ROM and function x 10 minutes. Pt tolerated tx well without adverse effects. She was educated on what to expect following the procedure and she verbalized understanding- Not performed today      Written Home Exercises Provided: Reviewed current home exercise program   Pt demo good understanding of the education provided. Carmen KAUR Declan demonstrated good return demonstration of activities.     Assessment:     Patient with fair tolerance for therapy session. Remains with complaints of pain and difficulty with activities.   Pt will continue to benefit from skilled PT intervention. Medical Necessity is demonstrated by:  Unable to participate in daily activities, Continued inability to participate in vocational pursuits, Pain limits function of effected part for some activities, Unable to participate fully in daily activities, Requires skilled supervision to complete and progress HEP and Weakness.    Patient is making good progress towards established goals.    GOALS: Short Term Goals:  4 weeks     - Pt will increase L hip IR ROM by at least 10 degrees in order to show improved functional mobility. Met 5/24/18  - Pt will increase L hip flexion ROM by at least 10 degrees in order to show improved functional mobility. Met 5/24/18  - Pt will be able to correctly recruit her core during pelvic tilting activities 10 times for 10 seconds in order to show improved core recruitment and coordination. Met 5/24/18  - Pt to tolerate HEP to improve ROM and independence with ADL's. Met 5/24/18     Long Term Goals: 5 weeks     - Pt will resume prior exercise routine 2-3x/week without increased  back or hip pain in order to return to PLOF.  - Pt will be able to lay on B hips for greater than 10 minutes without ppain in order to improve positional tolerance.  - Pt will increase B hip extension and abduction strength by 1 ms grade in order to increase tolerance to functional activities and ADLs.  - Pt will have a negative GIOVANI test B in order to show improved muscle extensibility and improve functional mobility.  - Pt to be Independent with updated HEP in order to maintain therapy gains following DC from therapy.    New/Revised goals: None at this time.       Plan:  Continue with established Plan of Care towards PT goals.

## 2018-07-17 ENCOUNTER — CLINICAL SUPPORT (OUTPATIENT)
Dept: REHABILITATION | Facility: HOSPITAL | Age: 58
End: 2018-07-17
Attending: INTERNAL MEDICINE
Payer: COMMERCIAL

## 2018-07-17 DIAGNOSIS — M25.551 PAIN OF BOTH HIP JOINTS: ICD-10-CM

## 2018-07-17 DIAGNOSIS — M25.552 PAIN OF BOTH HIP JOINTS: ICD-10-CM

## 2018-07-17 PROCEDURE — 97140 MANUAL THERAPY 1/> REGIONS: CPT | Mod: PO

## 2018-07-17 PROCEDURE — 97110 THERAPEUTIC EXERCISES: CPT | Mod: PO

## 2018-07-17 NOTE — PROGRESS NOTES
Name: Carmen Manriquez  Clinic Number: [id number]  Date of Treatment: 07/17/2018  Diagnosis:   Encounter Diagnosis   Name Primary?    Pain of both hip joints        Physician: Zoran Up, *      Evaluation Date: 4/24/18  Visit # authorized: 14 / 20 (17 visits total)  PTA Visit number:   Authorization period: 12/31/17      Time in: 8:00 am   Time Out: 855 am  Total Treatment Time: 55 minutes   Billable Time: 55  minutes       Subjective:    Carmen Manriquez reports no improvement in symtoms. MD appt made for September with rheumatologist. Pt would like an MRI.  Patient reports their pain to be 4 /10 on a 0-10 scale with 0 being no pain and 10 being the worst pain imaginable.  Tightness in low back, B hips, and knees continue to cause daily pain. Pt states pain is duller than when she started but still present.    Objective    Reassess:    Hip Range of Motion:    Left active Left Passive Right active  Right Passive   Flexion 120  WNL   Abduction 20* pain 30 25 30   Extension NT NT NT NT   Ext. Rotation WNL NT WNL NT   Int. Rotation 33 NT 35 NT            Lower Extremity Strength  Right LE   Left LE     Knee extension: 5/5 Knee extension: 5/5   Knee flexion: 5/5 Knee flexion: 4+/5   Hip flexion: 4+/5 Hip flexion: 4+/5   Hip Internal Rotation:  4+/5 with pain    Hip Internal Rotation: 4/5      Hip External Rotation: 4+/5    Hip External Rotation: 4+/5      Hip extension:  (knee bent) 4-/5 Hip extension: 4-/5   Hip abduction: 4/5* pain Hip abduction: 4/5* pain   Hip adduction: 4/5 Hip adduction 4/5      GIOVANI: positive B, tightness in adductors and lateral hip    Unable to lay on either side >5 minutes    Has not resumed prior exercise routine    Patient received individual therapy to increase strength, endurance, ROM, flexibility, posture and core stabilization       Carmen Manriquez was instructed in and performed therapeutic exe rcises to develop strength, endurance, ROM and flexibility for 40 minutes  including reassessment    Hip flexor stretch 3 x 30 seconds   Bridging with glut isolation 20 x 5 seconds GTB  Prone quad stretch 3 x 30 seconds   Glut squeeze x 15 unilateral  Adductor stretch 3 x 30s with therapist assist  Dead bug x 15  Piriformis stretch knee to opposite shoulder 3 x 20 seconds  Active hamstring stretch 3 x 20 seconds   Hook lying hip abduction: 20 x 5 second hold  Hook lying hip adduction with ball: 20 x 5 second hold       Carmen Manriquez received the following manual therapy techniques: Joint mobilizations were applied to the B hips x 15 min    LLLD for 10 minutes  Lateral hip distraction B  Stick roll to quads and IT band bilateral    Written Home Exercises Provided: Reviewed current home exercise program   Pt demo good understanding of the education provided. Carmen Manriquez demonstrated good return demonstration of activities.     Assessment:     Pt has reached a plateau with therapy at this time. She has not met any LTGs except independence with her home exercise program. She continues with low back, B hip, and B knee pain limiting her functional mobility and activity level at each session. She is unable to lay on either hip > 5 minutes. She has back pain with increased walking, bending, and lifting as well. She has even performed a trial of mobic which has provided temporary but not lasting relief. Pt would benefit from further imaging to determine cause of pain. Pain appears to be radiating from the low back and causing B LE pain. Pt referred back to rheumatologist for further evaluation of pain and possible imaging at this time. She is independent with gentle stretches for her low back and hips to help maintain current flexibility and mobility.    GOALS: Short Term Goals:  4 weeks     - Pt will increase L hip IR ROM by at least 10 degrees in order to show improved functional mobility. Met 5/24/18  - Pt will increase L hip flexion ROM by at least 10 degrees in order to show improved  functional mobility. Met 5/24/18  - Pt will be able to correctly recruit her core during pelvic tilting activities 10 times for 10 seconds in order to show improved core recruitment and coordination. Met 5/24/18  - Pt to tolerate HEP to improve ROM and independence with ADL's. Met 5/24/18     Long Term Goals: 5 weeks     - Pt will resume prior exercise routine 2-3x/week without increased back or hip pain in order to return to PLOF. Not met 7/17/18  - Pt will be able to lay on B hips for greater than 10 minutes without ppain in order to improve positional tolerance.  Not met 7/17/18  - Pt will increase B hip extension and abduction strength by 1 ms grade in order to increase tolerance to functional activities and ADLs.  Not met 7/17/18  - Pt will have a negative GIOVANI test B in order to show improved muscle extensibility and improve functional mobility.  Not met 7/17/18  - Pt to be Independent with updated HEP in order to maintain therapy gains following DC from therapy. Met 7/17/18    New/Revised goals: None at this time.       Plan:    Discharge with referral back to referring provided for evaluation and imaging

## 2018-07-18 ENCOUNTER — OFFICE VISIT (OUTPATIENT)
Dept: RHEUMATOLOGY | Facility: CLINIC | Age: 58
End: 2018-07-18
Payer: COMMERCIAL

## 2018-07-18 VITALS
SYSTOLIC BLOOD PRESSURE: 115 MMHG | DIASTOLIC BLOOD PRESSURE: 79 MMHG | HEIGHT: 61 IN | BODY MASS INDEX: 22.47 KG/M2 | HEART RATE: 99 BPM | WEIGHT: 119 LBS

## 2018-07-18 DIAGNOSIS — M54.50 LOW BACK PAIN, NON-SPECIFIC: Primary | ICD-10-CM

## 2018-07-18 DIAGNOSIS — M25.551 PAIN OF BOTH HIP JOINTS: ICD-10-CM

## 2018-07-18 DIAGNOSIS — M25.552 PAIN OF BOTH HIP JOINTS: ICD-10-CM

## 2018-07-18 PROBLEM — K59.04 CHRONIC IDIOPATHIC CONSTIPATION: Status: ACTIVE | Noted: 2018-07-18

## 2018-07-18 PROCEDURE — 3008F BODY MASS INDEX DOCD: CPT | Mod: CPTII,S$GLB,, | Performed by: INTERNAL MEDICINE

## 2018-07-18 PROCEDURE — 99213 OFFICE O/P EST LOW 20 MIN: CPT | Mod: S$GLB,,, | Performed by: INTERNAL MEDICINE

## 2018-07-18 PROCEDURE — 99999 PR PBB SHADOW E&M-EST. PATIENT-LVL III: CPT | Mod: PBBFAC,,, | Performed by: INTERNAL MEDICINE

## 2018-07-18 RX ORDER — HYDROCODONE BITARTRATE AND ACETAMINOPHEN 5; 325 MG/1; MG/1
TABLET ORAL
COMMUNITY
End: 2018-08-13

## 2018-07-18 NOTE — PROGRESS NOTES
"Subjective:       Patient ID: Carmen Manriquez is a 58 y.o. female.    Chief Complaint: Disease Management    HPI     Hip/thigh pain has persisted  Some low back pain  Pain on awakening; worse after sits at computer; makes herself get up; movement helps  Poor sleep  Chronic constipation     tried to wean off of mobic and pain was no worse so stayed off of it  Takes an Aleve occas    Takes flexeril or ativan for sleep  linzess about one / week for constipation    Review of Systems   Constitutional: Negative for fever and unexpected weight change.   HENT: Negative for trouble swallowing.    Eyes: Positive for redness.   Respiratory: Negative for cough and shortness of breath.    Cardiovascular: Negative for chest pain.   Gastrointestinal: Positive for constipation. Negative for diarrhea.   Genitourinary: Negative for dysuria and genital sores.   Skin: Negative for rash.   Neurological: Negative for headaches.   Hematological: Does not bruise/bleed easily.         Objective:   /79   Pulse 99   Ht 5' 1" (1.549 m)   Wt 54 kg (119 lb)   BMI 22.48 kg/m²      Physical Exam   Neurological:   Nl tone, strength, movement   Musculoskeletal:   Nl joint exam x sl tender mid-distal thighs  Some knee crepitus           Lab Results   Component Value Date    SEDRATE 0 03/13/2018      Assessment:       1. Low back pain, non-specific    2. Pain of both hip joints            Plan:       Problem List Items Addressed This Visit     Pain of both hip joints     Has persistent myofascial back, hip and thigh pain that failed PT  SSS/WPE not diagnostic for FMS but has stress and IBS   Normal PE  No rheum disorder identified  Will get MRI of back and refer to PMR for further eval and treatment         Relevant Orders    MRI Lumbar Spine Without Contrast    Ambulatory referral to Physical Medicine Rehab      Other Visit Diagnoses     Low back pain, non-specific    -  Primary    Relevant Orders    MRI Lumbar Spine Without Contrast    " Ambulatory referral to Physical Medicine Rehab                                          Widespread pain index  Note the areas which the patient has had pain over the last week:                   Shoulder-girdle, left               Shoulder-girdle, right                         Upper arm left                       Upper arm right                         Lower arm left                       Lower arm right    Hip (buttock, trochanter) left x  Hip (buttock, trochanter) right x                           Upper leg, left x                         Upper leg, right x                           Lower leg, left                         Lower leg, right                                     Jaw, left                                   Jaw, right                                        Chest                                  Abdomen x                               Upper back                              Lower back x                                        Neck  Score will be from 0-19: 6                                         Symptom severity score  Fatigue 0-1   Waking Unrefreshed 1-2  Cognitive Symptoms 0   0 = no problem, 1=slight or mild problem 2= moderate; considerable problems often present and/or at a moderate level, 3 = severe, pervasive, continuous, life disturbing problem  For each of the 3 symptoms, indicate the level of severity over the past week using the Scale.  The symptom severity score is the sum of the severity of the 3 symptoms (fatigue, waking unrefreshed, and cognitive symptoms) plus the number of the following symptoms occurring during the previous 6 months:   Headaches 0  Pain or cramps in the lower abdomen 0  Depression 0  The final score is between 0 and 12: 3                                          Criteria  Patient has fibromyalgia if the following 3 conditions are met:  1.  Widespread pain index greater than or equal to 7 and symptom severity score greater than or equal to 5 or widespread pain index  between 3- 6, and symptom severity score greater than or equal to 9.    2.  Symptoms have been present in a similar level for at least 3 months  3.  The patient does not have a disorder that would otherwise sufficiently explain the pain

## 2018-07-18 NOTE — ASSESSMENT & PLAN NOTE
Has persistent myofascial back, hip and thigh pain that failed PT  SSS/WPE not diagnostic for FMS but has stress and IBS   Normal PE  No rheum disorder identified  Will get MRI of back and refer to PMR for further eval and treatment

## 2018-07-26 ENCOUNTER — HOSPITAL ENCOUNTER (OUTPATIENT)
Dept: RADIOLOGY | Facility: HOSPITAL | Age: 58
Discharge: HOME | End: 2018-07-26
Attending: INTERNAL MEDICINE
Payer: COMMERCIAL

## 2018-07-26 DIAGNOSIS — M25.552 PAIN OF BOTH HIP JOINTS: ICD-10-CM

## 2018-07-26 DIAGNOSIS — M25.551 PAIN OF BOTH HIP JOINTS: ICD-10-CM

## 2018-07-26 DIAGNOSIS — M54.50 LOW BACK PAIN, NON-SPECIFIC: ICD-10-CM

## 2018-07-26 PROCEDURE — 72148 MRI LUMBAR SPINE W/O DYE: CPT | Mod: TC

## 2018-07-26 PROCEDURE — 72148 MRI LUMBAR SPINE W/O DYE: CPT | Mod: 26,,, | Performed by: RADIOLOGY

## 2018-08-10 ENCOUNTER — TELEPHONE (OUTPATIENT)
Dept: SPINE | Facility: CLINIC | Age: 58
End: 2018-08-10

## 2018-08-13 ENCOUNTER — OFFICE VISIT (OUTPATIENT)
Dept: SPINE | Facility: CLINIC | Age: 58
End: 2018-08-13
Attending: PHYSICAL MEDICINE & REHABILITATION
Payer: COMMERCIAL

## 2018-08-13 VITALS — HEIGHT: 61 IN | WEIGHT: 118 LBS | BODY MASS INDEX: 22.28 KG/M2

## 2018-08-13 DIAGNOSIS — M47.816 SPONDYLOSIS OF LUMBAR REGION WITHOUT MYELOPATHY OR RADICULOPATHY: ICD-10-CM

## 2018-08-13 DIAGNOSIS — M25.551 PAIN OF BOTH HIP JOINTS: ICD-10-CM

## 2018-08-13 DIAGNOSIS — M70.62 TROCHANTERIC BURSITIS OF BOTH HIPS: Primary | ICD-10-CM

## 2018-08-13 DIAGNOSIS — M25.552 PAIN OF BOTH HIP JOINTS: ICD-10-CM

## 2018-08-13 DIAGNOSIS — M70.61 TROCHANTERIC BURSITIS OF BOTH HIPS: Primary | ICD-10-CM

## 2018-08-13 PROCEDURE — 99999 PR PBB SHADOW E&M-EST. PATIENT-LVL III: CPT | Mod: PBBFAC,,, | Performed by: PHYSICAL MEDICINE & REHABILITATION

## 2018-08-13 PROCEDURE — 99204 OFFICE O/P NEW MOD 45 MIN: CPT | Mod: S$GLB,,, | Performed by: PHYSICAL MEDICINE & REHABILITATION

## 2018-08-13 PROCEDURE — 3008F BODY MASS INDEX DOCD: CPT | Mod: CPTII,S$GLB,, | Performed by: PHYSICAL MEDICINE & REHABILITATION

## 2018-08-13 RX ORDER — TIZANIDINE 2 MG/1
2 TABLET ORAL EVERY 8 HOURS PRN
Qty: 90 TABLET | Refills: 2 | Status: SHIPPED | OUTPATIENT
Start: 2018-08-13 | End: 2020-12-02

## 2018-08-13 RX ORDER — METHYLPREDNISOLONE 4 MG/1
TABLET ORAL
Qty: 1 PACKAGE | Refills: 0 | Status: SHIPPED | OUTPATIENT
Start: 2018-08-13 | End: 2018-09-03

## 2018-08-13 NOTE — PROGRESS NOTES
Subjective:      Patient ID: Carmen Manriquez is a 58 y.o. female.    Chief Complaint: Hip Pain (bilateral)    Ms Manriquez is a 57 yo female here for evaluation of low back and hip pain.  She has had back pain for years.  The pain in the back comes and goes.  Nov 2017 she hurt her back getting out of shower and was diagnosed with pinched nerve and then a month later she started having pain on the outside of the hips.  The pain on outside of the hips is constant.  The pain is worse with sitting and sleeping on her sides.  She has pain with walking too much.  The pain is better when start moving.  mobic helped and stretches help.  She did PT and dry needling and that helps some.  She has not had any injections.  She does not like needles.  She feels like the pain is a burning and achy pain.  She feels like her butt and legs go numb when she sits too long.  She tries to watch her posture.  She feels like pain is 3/10 now, worst 8/10 lying down, best 2/10 moving around.  She tries not to take the mobic.  She will take flexeril as needed at night    X-ray hips 3/2018  Bilateral hips with pelvis.  Bones are fairly well mineralized.  Mild degenerative change at the SI joints.  Small spurs about the superior lateral aspect of the acetabula.    MRI lumbar 7/26/2018  FINDINGS:  Alignment: Normal.    Vertebrae: Normal marrow signal. No fracture.    Discs: Disc desiccation at L4-L5 and L5-S1 without significant height loss.    Cord: Normal.  Conus terminates at T12.    Degenerative findings:    T12-L1: No significant disease.  No significant spinal canal stenosis or neural foraminal narrowing.    L1-L2: Mild facet arthropathy.  No significant spinal canal stenosis or neural foraminal narrowing.    L2-L3: Mild facet arthropathy and mild ligamentum flavum hypertrophy.  No significant spinal canal stenosis or neural foraminal narrowing.    L3-L4: Mild broad-based posterior disc bulge, mild ligamentum flavum hypertrophy, and mild facet  arthropathy.  No significant spinal canal stenosis or neural foraminal narrowing.    L4-L5: Mild broad-based posterior disc bulge with annular fissuring, mild ligamentum flavum hypertrophy, and mild facet arthropathy.  No significant spinal canal stenosis or neural foraminal narrowing.    L5-S1: Mild broad-based posterior disc bulge with annular fissuring, and mild facet arthropathy.  No significant spinal canal stenosis or neural foraminal narrowing.    Paraspinal muscles & soft tissues: Unremarkable.    Impression      Mild lumbar spondylosis without significant spinal canal stenosis or neural foraminal narrowing.  Findings detailed level by level above.    X-ray lumbar 2017  There is no significant degenerative disk disease. Vertebral body heights are maintained. Paravertebral soft tissues are unremarkable.    Impression       No acute fracture or significant degenerative disk diseas    Past Medical History:  No date: Abnormal Pap smear  No date: Allergy  No date: Endometriosis, moderate  No date: GERD (gastroesophageal reflux disease)  No date: Heart palpitations    Past Surgical History:  No date: BILE DUCT EXPLORATION  No date:  SECTION, CLASSIC  No date: CHOLECYSTECTOMY  No date: ENDOMETRIAL ABLATION  No date: hx benign breast biopsy  No date: leep  No date: TONSILLECTOMY  No date: TUBAL LIGATION    Review of patient's family history indicates:  Problem: Diabetes      Relation: Mother          Age of Onset: (Not Specified)  Problem: Cancer      Relation: Brother          Age of Onset: (Not Specified)          Comment: gallbladder ca  Problem: Alcohol abuse      Relation: Father          Age of Onset: (Not Specified)  Problem: Breast cancer      Relation: Paternal Grandmother          Age of Onset: (Not Specified)  Problem: Cancer      Relation: Paternal Grandmother          Age of Onset: (Not Specified)          Comment: breast  Problem: Cervical cancer      Relation: Maternal Grandmother           Age of Onset: (Not Specified)  Problem: Cancer      Relation: Maternal Grandmother          Age of Onset: (Not Specified)          Comment: cervical  Problem: Heart disease      Relation: Maternal Uncle          Age of Onset: (Not Specified)  Problem: Kidney disease      Relation: Sister          Age of Onset: (Not Specified)          Comment: nephrotic syndrome  Problem: No Known Problems      Relation: Son          Age of Onset: (Not Specified)  Problem: No Known Problems      Relation: Sister          Age of Onset: (Not Specified)  Problem: Colon cancer      Relation: Neg Hx          Age of Onset: (Not Specified)  Problem: Ovarian cancer      Relation: Neg Hx          Age of Onset: (Not Specified)      Social History    Socioeconomic History      Marital status:       Spouse name: Not on file      Number of children: 1      Years of education: Not on file      Highest education level: Not on file    Social Needs      Financial resource strain: Not on file      Food insecurity - worry: Not on file      Food insecurity - inability: Not on file      Transportation needs - medical: Not on file      Transportation needs - non-medical: Not on file    Occupational History      Occupation:         Employer: [employer]    Tobacco Use      Smoking status: Light Tobacco Smoker        Packs/day: 0.50        Quit date: 2015        Years since quittin.6      Smokeless tobacco: Never Used      Tobacco comment: smoker since age 14, used to smoke 1 ppd    Substance and Sexual Activity      Alcohol use: Yes        Alcohol/week: 2.4 oz        Types: 4 Cans of beer per week      Drug use: No      Sexual activity: Yes        Partners: Male        Birth control/protection: Post-menopausal    Other Topics      Concerns:        Not on file    Social History Narrative      No exercise at present.      Current Outpatient Medications:  butalbital-acetaminophen-caffeine -40 mg  (FIORICET, ESGIC) -40 mg per tablet, take 1 tablet by mouth every 4 hours if needed for headache, Disp: , Rfl: 0  collagen, bovine, 100 % Powd, , Disp: , Rfl:   cyclobenzaprine (FLEXERIL) 10 MG tablet, Take 0.5 tablets (5 mg total) by mouth nightly as needed for Muscle spasms. Please dispense brand Flexeril, Disp: 30 tablet, Rfl: 2  HYDROcodone-acetaminophen (NORCO) 5-325 mg per tablet, hydrocodone 5 mg-acetaminophen 325 mg tablet, Disp: , Rfl:   linaclotide (LINZESS) 290 mcg Cap, Linzess 290 mcg capsule, Disp: , Rfl:   lorazepam (ATIVAN) 0.5 MG tablet, Take 0.5 mg by mouth as needed for Anxiety., Disp: , Rfl:   TAZORAC 0.05 % Crea cream, Apply to face nightly as needed, Disp: , Rfl:     No current facility-administered medications for this visit.       Review of patient's allergies indicates:   -- Ciprofloxacin -- Hives   -- Sulfa (sulfonamide antibiotics) -- Other (See Comments)    --  Cp          Review of Systems   Constitution: Negative for weight gain and weight loss.   Cardiovascular: Negative for chest pain.   Respiratory: Negative for shortness of breath.    Musculoskeletal: Positive for back pain and joint pain (outer hip pain). Negative for joint swelling.   Gastrointestinal: Negative for abdominal pain and bowel incontinence.   Genitourinary: Negative for bladder incontinence.   Neurological: Negative for numbness.         Objective:        General: Carmen is well-developed, well-nourished, appears stated age, in no acute distress, alert and oriented to time, place and person.     General    Vitals reviewed.  Constitutional: She is oriented to person, place, and time. She appears well-developed and well-nourished.   HENT:   Head: Normocephalic and atraumatic.   Pulmonary/Chest: Effort normal.   Neurological: She is alert and oriented to person, place, and time.   Psychiatric: She has a normal mood and affect. Her behavior is normal. Judgment and thought content normal.     General Musculoskeletal  Exam   Gait: normal     Right Ankle/Foot Exam     Tests   Heel Walk: able to perform  Tiptoe Walk: able to perform    Left Ankle/Foot Exam     Tests   Heel Walk: able to perform  Tiptoe Walk: able to perform      Right Hip Exam     Tenderness   The patient tender to palpation of the trochanteric bursa. Also right side trochanteric tenderness.    Range of Motion   External rotation: 50     Tests   Pain w/ forced internal rotation (GIOVANI): absent (outer hip pain)  Left Hip Exam     Tenderness   The patient tender to palpation of the trochanteric bursa and piriformis. Also left side trochanteric tenderness.    Range of Motion   External rotation: 40     Tests   Pain w/ forced internal rotation (GIOVANI): absent (outer hip pain)      Back (L-Spine & T-Spine) / Neck (C-Spine) Exam     Tenderness   The patient is tender to palpation of the right side trochanteric and left side trochanteric. Left paramedian tenderness of the Sacrum.     Back (L-Spine & T-Spine) Range of Motion   Extension: 20   Flexion: 90   Lateral bend right: 20   Lateral bend left: 20   Rotation right: 40   Rotation left: 40     Spinal Sensation   Right Side Sensation  C-Spine Level: normal   L-Spine Level: normal  S-Spine Level: normal  Left Side Sensation  C-Spine Level: normal  L-Spine Level: normal  S-Spine Level: normal    Back (L-Spine & T-Spine) Tests   Right Side Tests  Straight leg raise:      Sitting SLR: > 70 degrees      Left Side Tests  Straight leg raise:     Sitting SLR: > 70 degrees          Other She has no scoliosis .  Spinal Kyphosis:  Absent    Comments:  No pain with piriformis stretch      Muscle Strength   Right Upper Extremity   Biceps: 5/5/5   Deltoid:  5/5  Triceps:  5/5  Wrist extension: 5/5/5   Finger Flexors:  5/5  Left Upper Extremity  Biceps: 5/5/5   Deltoid:  5/5  Triceps:  5/5  Wrist extension: 5/5/5   Finger Flexors:  5/5  Right Lower Extremity   Hip Flexion: 5/5   Quadriceps:  5/5   Anterior tibial:  5/5/5  EHL:   5/5  Left Lower Extremity   Hip Flexion: 5/5   Quadriceps:  5/5   Anterior tibial:  5/5/5   EHL:  5/5    Reflexes     Left Side  Biceps:  2+  Triceps:  2+  Brachioradialis:  2+  Quadriceps:  2+  Achilles:  2+  Left Bolton's Sign:  Absent  Babinski Sign:  absent    Right Side   Biceps:  2+  Triceps:  2+  Brachioradialis:  2+  Quadriceps:  2+  Achilles:  2+  Right Bolton's Sign:  absent  Babinski Sign:  absent    Vascular Exam     Right Pulses        Carotid:                  2+    Left Pulses        Carotid:                  2+              Assessment:       1. Trochanteric bursitis of both hips    2. Pain of both hip joints    3. Spondylosis of lumbar region without myelopathy or radiculopathy           Plan:       Orders Placed This Encounter    MRI Pelvis Without Contrast    methylPREDNISolone (MEDROL DOSEPACK) 4 mg tablet    tiZANidine (ZANAFLEX) 2 MG tablet     More than 50% of the total time of 45 minutes was spent in counseling on diagnosis and treatment options. We discussed back and hip pain and the nature of back and hip pain.  We discussed that it is not one thing that causes the pain but an accumulation of multiple things that we do.  We discussed the MRI and the mild degenerative changes.  Her pain is more with hip motion and less with her back.  She does have bursitis, we did discuss possibility of labral tear, she does have some spurs that can cause some hip impingement.  We discussed posture sitting and the importance of trying to sit better.  She has been working on it.  We discussed the benefits of therapy and exercise and continuing to move.  We has been doing her HEP and it helps.  We discussed doing stretches more if they help.  She has not wanted to do injections.    1.  MRI pelvis to look at hips and bursitis, and si Joints with Si joint degenerative changes and left si tenderness  2.  We discussed injections she is not interested.  I would try bursa injections first.  We could try facet  injections  3.  Medrol dose pop  4.  Tizanidine 2mg po TID to see if helps muscle tightness and muscle knots  5.  RTC 6 weeks        Follow-up: No Follow-up on file. If there are any questions prior to this, the patient was instructed to contact the office.

## 2018-08-29 ENCOUNTER — HOSPITAL ENCOUNTER (OUTPATIENT)
Dept: RADIOLOGY | Facility: HOSPITAL | Age: 58
Discharge: HOME | End: 2018-08-29
Attending: PHYSICAL MEDICINE & REHABILITATION
Payer: COMMERCIAL

## 2018-08-29 ENCOUNTER — PATIENT MESSAGE (OUTPATIENT)
Dept: SPINE | Facility: CLINIC | Age: 58
End: 2018-08-29

## 2018-08-29 DIAGNOSIS — M70.61 TROCHANTERIC BURSITIS OF BOTH HIPS: ICD-10-CM

## 2018-08-29 DIAGNOSIS — M25.552 PAIN OF BOTH HIP JOINTS: ICD-10-CM

## 2018-08-29 DIAGNOSIS — M70.62 TROCHANTERIC BURSITIS OF BOTH HIPS: ICD-10-CM

## 2018-08-29 DIAGNOSIS — M25.551 PAIN OF BOTH HIP JOINTS: ICD-10-CM

## 2018-08-29 PROCEDURE — 72195 MRI PELVIS W/O DYE: CPT | Mod: 26,,, | Performed by: RADIOLOGY

## 2018-08-29 PROCEDURE — 72195 MRI PELVIS W/O DYE: CPT | Mod: TC

## 2019-05-24 ENCOUNTER — OFFICE VISIT (OUTPATIENT)
Dept: INTERNAL MEDICINE | Facility: CLINIC | Age: 59
End: 2019-05-24
Payer: COMMERCIAL

## 2019-05-24 ENCOUNTER — LAB VISIT (OUTPATIENT)
Dept: LAB | Facility: HOSPITAL | Age: 59
End: 2019-05-24
Attending: INTERNAL MEDICINE
Payer: COMMERCIAL

## 2019-05-24 VITALS
SYSTOLIC BLOOD PRESSURE: 126 MMHG | WEIGHT: 119.63 LBS | HEIGHT: 61 IN | DIASTOLIC BLOOD PRESSURE: 80 MMHG | BODY MASS INDEX: 22.58 KG/M2 | OXYGEN SATURATION: 98 % | HEART RATE: 85 BPM

## 2019-05-24 DIAGNOSIS — R00.2 PALPITATIONS: ICD-10-CM

## 2019-05-24 DIAGNOSIS — M79.10 MUSCLE PAIN: Primary | ICD-10-CM

## 2019-05-24 DIAGNOSIS — E53.8 VITAMIN B12 DEFICIENCY: ICD-10-CM

## 2019-05-24 DIAGNOSIS — E55.9 VITAMIN D DEFICIENCY DISEASE: ICD-10-CM

## 2019-05-24 DIAGNOSIS — M79.10 MUSCLE PAIN: ICD-10-CM

## 2019-05-24 LAB
25(OH)D3+25(OH)D2 SERPL-MCNC: 23 NG/ML (ref 30–96)
ALBUMIN SERPL BCP-MCNC: 4.6 G/DL (ref 3.5–5.2)
ALP SERPL-CCNC: 104 U/L (ref 55–135)
ALT SERPL W/O P-5'-P-CCNC: 13 U/L (ref 10–44)
ANION GAP SERPL CALC-SCNC: 8 MMOL/L (ref 8–16)
AST SERPL-CCNC: 16 U/L (ref 10–40)
BASOPHILS # BLD AUTO: 0.04 K/UL (ref 0–0.2)
BASOPHILS NFR BLD: 0.6 % (ref 0–1.9)
BILIRUB SERPL-MCNC: 0.3 MG/DL (ref 0.1–1)
BUN SERPL-MCNC: 9 MG/DL (ref 6–20)
CALCIUM SERPL-MCNC: 9.7 MG/DL (ref 8.7–10.5)
CHLORIDE SERPL-SCNC: 105 MMOL/L (ref 95–110)
CK SERPL-CCNC: 115 U/L (ref 20–180)
CO2 SERPL-SCNC: 29 MMOL/L (ref 23–29)
CREAT SERPL-MCNC: 0.7 MG/DL (ref 0.5–1.4)
CRP SERPL-MCNC: 0.7 MG/L (ref 0–8.2)
DIFFERENTIAL METHOD: ABNORMAL
EOSINOPHIL # BLD AUTO: 0.1 K/UL (ref 0–0.5)
EOSINOPHIL NFR BLD: 1.9 % (ref 0–8)
ERYTHROCYTE [DISTWIDTH] IN BLOOD BY AUTOMATED COUNT: 13.5 % (ref 11.5–14.5)
ERYTHROCYTE [SEDIMENTATION RATE] IN BLOOD BY WESTERGREN METHOD: <2 MM/HR (ref 0–36)
EST. GFR  (AFRICAN AMERICAN): >60 ML/MIN/1.73 M^2
EST. GFR  (NON AFRICAN AMERICAN): >60 ML/MIN/1.73 M^2
GLUCOSE SERPL-MCNC: 80 MG/DL (ref 70–110)
HCT VFR BLD AUTO: 44 % (ref 37–48.5)
HGB BLD-MCNC: 14.6 G/DL (ref 12–16)
LYMPHOCYTES # BLD AUTO: 2.2 K/UL (ref 1–4.8)
LYMPHOCYTES NFR BLD: 30.8 % (ref 18–48)
MCH RBC QN AUTO: 31.3 PG (ref 27–31)
MCHC RBC AUTO-ENTMCNC: 33.2 G/DL (ref 32–36)
MCV RBC AUTO: 94 FL (ref 82–98)
MONOCYTES # BLD AUTO: 0.5 K/UL (ref 0.3–1)
MONOCYTES NFR BLD: 7.5 % (ref 4–15)
NEUTROPHILS # BLD AUTO: 4.2 K/UL (ref 1.8–7.7)
NEUTROPHILS NFR BLD: 58.9 % (ref 38–73)
PLATELET # BLD AUTO: 270 K/UL (ref 150–350)
PMV BLD AUTO: 9.6 FL (ref 9.2–12.9)
POTASSIUM SERPL-SCNC: 3.5 MMOL/L (ref 3.5–5.1)
PROT SERPL-MCNC: 7.8 G/DL (ref 6–8.4)
RBC # BLD AUTO: 4.67 M/UL (ref 4–5.4)
SODIUM SERPL-SCNC: 142 MMOL/L (ref 136–145)
TSH SERPL DL<=0.005 MIU/L-ACNC: 1.22 UIU/ML (ref 0.4–4)
VIT B12 SERPL-MCNC: 220 PG/ML (ref 210–950)
WBC # BLD AUTO: 7.18 K/UL (ref 3.9–12.7)

## 2019-05-24 PROCEDURE — 82306 VITAMIN D 25 HYDROXY: CPT

## 2019-05-24 PROCEDURE — 36415 COLL VENOUS BLD VENIPUNCTURE: CPT

## 2019-05-24 PROCEDURE — 85652 RBC SED RATE AUTOMATED: CPT

## 2019-05-24 PROCEDURE — 84443 ASSAY THYROID STIM HORMONE: CPT

## 2019-05-24 PROCEDURE — 99999 PR PBB SHADOW E&M-EST. PATIENT-LVL III: CPT | Mod: PBBFAC,,, | Performed by: INTERNAL MEDICINE

## 2019-05-24 PROCEDURE — 86592 SYPHILIS TEST NON-TREP QUAL: CPT

## 2019-05-24 PROCEDURE — 86140 C-REACTIVE PROTEIN: CPT

## 2019-05-24 PROCEDURE — 80053 COMPREHEN METABOLIC PANEL: CPT

## 2019-05-24 PROCEDURE — 3008F BODY MASS INDEX DOCD: CPT | Mod: CPTII,S$GLB,, | Performed by: INTERNAL MEDICINE

## 2019-05-24 PROCEDURE — 99214 OFFICE O/P EST MOD 30 MIN: CPT | Mod: S$GLB,,, | Performed by: INTERNAL MEDICINE

## 2019-05-24 PROCEDURE — 82550 ASSAY OF CK (CPK): CPT

## 2019-05-24 PROCEDURE — 85025 COMPLETE CBC W/AUTO DIFF WBC: CPT

## 2019-05-24 PROCEDURE — 82607 VITAMIN B-12: CPT

## 2019-05-24 PROCEDURE — 99999 PR PBB SHADOW E&M-EST. PATIENT-LVL III: ICD-10-PCS | Mod: PBBFAC,,, | Performed by: INTERNAL MEDICINE

## 2019-05-24 PROCEDURE — 99214 PR OFFICE/OUTPT VISIT, EST, LEVL IV, 30-39 MIN: ICD-10-PCS | Mod: S$GLB,,, | Performed by: INTERNAL MEDICINE

## 2019-05-24 PROCEDURE — 3008F PR BODY MASS INDEX (BMI) DOCUMENTED: ICD-10-PCS | Mod: CPTII,S$GLB,, | Performed by: INTERNAL MEDICINE

## 2019-05-24 RX ORDER — FLUTICASONE PROPIONATE 50 MCG
SPRAY, SUSPENSION (ML) NASAL
COMMUNITY
End: 2019-05-24

## 2019-05-24 RX ORDER — ERGOCALCIFEROL 1.25 MG/1
50000 CAPSULE ORAL
Qty: 24 CAPSULE | Refills: 1 | Status: SHIPPED | OUTPATIENT
Start: 2019-05-27 | End: 2019-11-09 | Stop reason: SDUPTHER

## 2019-05-24 RX ORDER — GABAPENTIN 100 MG/1
CAPSULE ORAL
Qty: 90 CAPSULE | Refills: 1 | Status: SHIPPED | OUTPATIENT
Start: 2019-05-24 | End: 2020-12-02

## 2019-05-24 NOTE — PROGRESS NOTES
"Chief Complaint:muscle pain    HPI:This is a 59 year old woman who presents for an urgent care visit due to muscle pain in both thighs and both calves for the last year. It is a "burning, aggravating, stiff pain".  If she sits for too long" my legs go ice cold".  Symptoms improve when she gets up and moves around. Pain is constant.  Pain is tolerable.  She has taken mobic in the past.  Mobic dulls the pain.  She feels that she does not have the strength that she used to. She does not exercise. She does stretching daily. She lays on the floor and does leg lift.  She has done physical therapy in the past. No change in her symptoms with physical therapy.     She has been diagnosed with trochanteric bursitis in the past.  She does not sleep well due to her hip pain. Her legs are very restless at night. She cannot get comfortable    She has palpitations that come and go for the last several years. Smoking and caffeine make them worse.  For the last week, she gets lightheaded and feels like she is going to pass out when she gets the palpitations.  She has an episode every other day.  Timing does not matter.  Episode of palpitations lasts seconds.  Lightheadedness lasts seconds.  She smokes 10 cigarettes daily. She started to use the Juul three times a day one week ago.  She drinks 2 cups of coffee a day. No other caffeine.     REVIEW OF SYSTEMS: She denies fevers, chills, visual change, hearing loss, sinus congestion, sore throat, chest pain, shortness of breath, nausea, vomiting, constipation, diarrhea, dysuria, hematuria, polyuria, headaches.     She has been more tired.  Vision gradually getting worse.     She is always thirsty.     Past Medical History:   Diagnosis Date    Abnormal Pap smear     Allergy     Endometriosis, moderate     GERD (gastroesophageal reflux disease)     Heart palpitations      Past Surgical History:   Procedure Laterality Date    BILE DUCT EXPLORATION       SECTION, CLASSIC      " CHOLECYSTECTOMY      CHOLECYSTECTOMY, LAPAROSCOPIC, WITH CHOLANGIOGRAM N/A 4/3/2013    [facility] 2ND FLR    ENDOMETRIAL ABLATION      ERCP (ENDOSCOPIC RETROGRADE CHOLANGIOPANCREATOGRAPHY) N/A 4/4/2013    Performed [facility] Joao Saleem [facility] ENDO (2ND FLR)    ETHMOIDECTOMY N/A 6/27/2017    [facility]    hx benign breast biopsy      INSERTION TUBE-PE Right 6/27/2017    [facility] OR    leep      SINUS SURGERY FUNCTIONAL ENDOSCOPIC WITH NAVIGATION  6/27/2017    [facility]    TONSILLECTOMY      TUBAL LIGATION       Social History     Socioeconomic History    Marital status:      Spouse name: Not on file    Number of children: 1    Years of education: Not on file    Highest education level: Not on file   Occupational History    Occupation:      Employer: [employer]   Social Needs    Financial resource strain: Not on file    Food insecurity:     Worry: Not on file     Inability: Not on file    Transportation needs:     Medical: Not on file     Non-medical: Not on file   Tobacco Use    Smoking status: Light Tobacco Smoker     Packs/day: 0.50     Last attempt to quit: 12/8/2015     Years since quitting: 3.4    Smokeless tobacco: Never Used    Tobacco comment: smoker since age 14, used to smoke 1 ppd   Substance and Sexual Activity    Alcohol use: Yes     Alcohol/week: 2.4 oz     Types: 4 Cans of beer per week    Drug use: No    Sexual activity: Yes     Partners: Male     Birth control/protection: Post-menopausal   Lifestyle    Physical activity:     Days per week: Not on file     Minutes per session: Not on file    Stress: Not on file   Relationships    Social connections:     Talks on phone: Not on file     Gets together: Not on file     Attends [employer] service: Not on file     Active member of club or  "organization: Not on file     Attends meetings of clubs or organizations: Not on file     Relationship status: Not on file   Other Topics Concern    Not on file   Social History Narrative    No exercise at present.     Meds and allergies: updated on EPIC    Physical exam:  /80   Pulse 85   Ht 5' 1" (1.549 m)   Wt 54.2 kg (119 lb 9.6 oz)   SpO2 98%   BMI 22.60 kg/m²     General: alert, oriented x 3, no apparent distress.  Affect normal  HEENT: Conjunctivae: anicteric, PERRL, EOMI, TM clear, Oralpharynx clear  Neck: supple, no thyroid enlargement, no cervical lymphadenopathy  Resp: effort normal, lungs clear bilaterally  CV: Regular rate and rhythm without murmurs, gallops or rubs, no lower extremity edema,  Abdomen: soft, non-distended, non-tender, bowel sounds present, no hepatosplenomegaly.    Assessment/Plan:  1. Muscle pain - labs today. Gabapentin 100 mg 1-3 capsules at bedtime.   2. Palpitations - stop the Juul nicotine. Holter monitor  3. Insomnia - gabapentin should help.   4. VItamin d deficeincy - vitamin d 50,000 units twice a week.   She is to follow up with her pcp, sooner if abnormal testing.     "

## 2019-05-25 LAB — RPR SER QL: NORMAL

## 2019-05-31 ENCOUNTER — CLINICAL SUPPORT (OUTPATIENT)
Dept: CARDIOLOGY | Facility: HOSPITAL | Age: 59
End: 2019-05-31
Attending: INTERNAL MEDICINE
Payer: COMMERCIAL

## 2019-05-31 DIAGNOSIS — R00.2 PALPITATIONS: ICD-10-CM

## 2019-05-31 PROCEDURE — 93227 HOLTER MONITOR - 48 HOUR (CUPID ONLY): ICD-10-PCS | Mod: ,,, | Performed by: INTERNAL MEDICINE

## 2019-05-31 PROCEDURE — 93227 XTRNL ECG REC<48 HR R&I: CPT | Mod: ,,, | Performed by: INTERNAL MEDICINE

## 2019-05-31 PROCEDURE — 93225 XTRNL ECG REC<48 HRS REC: CPT

## 2019-06-04 LAB
OHS CV EVENT MONITOR DAY: 2
OHS CV HOLTER LENGTH DECIMAL HOURS: 96
OHS CV HOLTER LENGTH HOURS: 48
OHS CV HOLTER LENGTH MINUTES: 0

## 2019-09-30 ENCOUNTER — HOSPITAL ENCOUNTER (OUTPATIENT)
Dept: RADIOLOGY | Facility: HOSPITAL | Age: 59
Discharge: HOME | End: 2019-09-30
Attending: INTERNAL MEDICINE
Payer: COMMERCIAL

## 2019-09-30 ENCOUNTER — OFFICE VISIT (OUTPATIENT)
Dept: INTERNAL MEDICINE | Facility: CLINIC | Age: 59
End: 2019-09-30
Payer: COMMERCIAL

## 2019-09-30 VITALS
SYSTOLIC BLOOD PRESSURE: 108 MMHG | HEART RATE: 77 BPM | TEMPERATURE: 98 F | WEIGHT: 118.81 LBS | OXYGEN SATURATION: 99 % | BODY MASS INDEX: 22.43 KG/M2 | HEIGHT: 61 IN | DIASTOLIC BLOOD PRESSURE: 82 MMHG

## 2019-09-30 DIAGNOSIS — R10.9 ABDOMINAL PAIN, UNSPECIFIED ABDOMINAL LOCATION: ICD-10-CM

## 2019-09-30 DIAGNOSIS — R06.2 WHEEZING: ICD-10-CM

## 2019-09-30 DIAGNOSIS — M19.90 OSTEOARTHRITIS, UNSPECIFIED OSTEOARTHRITIS TYPE, UNSPECIFIED SITE: ICD-10-CM

## 2019-09-30 DIAGNOSIS — E78.5 HYPERLIPIDEMIA, UNSPECIFIED HYPERLIPIDEMIA TYPE: Primary | ICD-10-CM

## 2019-09-30 DIAGNOSIS — E55.9 VITAMIN D DEFICIENCY: ICD-10-CM

## 2019-09-30 LAB
BILIRUB UR QL STRIP: NEGATIVE
CLARITY UR REFRACT.AUTO: CLEAR
COLOR UR AUTO: NORMAL
GLUCOSE UR QL STRIP: NEGATIVE
HGB UR QL STRIP: NEGATIVE
KETONES UR QL STRIP: NEGATIVE
LEUKOCYTE ESTERASE UR QL STRIP: NEGATIVE
NITRITE UR QL STRIP: NEGATIVE
PH UR STRIP: 7 [PH] (ref 5–8)
PROT UR QL STRIP: NEGATIVE
SP GR UR STRIP: 1.01 (ref 1–1.03)
URN SPEC COLLECT METH UR: NORMAL

## 2019-09-30 PROCEDURE — 3008F BODY MASS INDEX DOCD: CPT | Mod: CPTII,S$GLB,, | Performed by: INTERNAL MEDICINE

## 2019-09-30 PROCEDURE — 99214 PR OFFICE/OUTPT VISIT, EST, LEVL IV, 30-39 MIN: ICD-10-PCS | Mod: S$GLB,,, | Performed by: INTERNAL MEDICINE

## 2019-09-30 PROCEDURE — 99999 PR PBB SHADOW E&M-EST. PATIENT-LVL IV: ICD-10-PCS | Mod: PBBFAC,,, | Performed by: INTERNAL MEDICINE

## 2019-09-30 PROCEDURE — 99999 PR PBB SHADOW E&M-EST. PATIENT-LVL IV: CPT | Mod: PBBFAC,,, | Performed by: INTERNAL MEDICINE

## 2019-09-30 PROCEDURE — 3008F PR BODY MASS INDEX (BMI) DOCUMENTED: ICD-10-PCS | Mod: CPTII,S$GLB,, | Performed by: INTERNAL MEDICINE

## 2019-09-30 PROCEDURE — 87086 URINE CULTURE/COLONY COUNT: CPT

## 2019-09-30 PROCEDURE — 71046 XR CHEST PA AND LATERAL: ICD-10-PCS | Mod: 26,,, | Performed by: RADIOLOGY

## 2019-09-30 PROCEDURE — 99214 OFFICE O/P EST MOD 30 MIN: CPT | Mod: S$GLB,,, | Performed by: INTERNAL MEDICINE

## 2019-09-30 PROCEDURE — 71046 X-RAY EXAM CHEST 2 VIEWS: CPT | Mod: 26,,, | Performed by: RADIOLOGY

## 2019-09-30 PROCEDURE — 81003 URINALYSIS AUTO W/O SCOPE: CPT

## 2019-09-30 PROCEDURE — 71046 X-RAY EXAM CHEST 2 VIEWS: CPT | Mod: TC

## 2019-09-30 NOTE — PROGRESS NOTES
"Answers for HPI/ROS submitted by the patient on 9/27/2019   Back pain  Chronicity: chronic  Onset: more than 1 month ago  Frequency: constantly  Progression since onset: waxing and waning  Pain location: sacro-iliac, costovertebral angle  Pain quality: aching, cramping  Pain - numeric: 4/10  Aggravated by: sitting  Stiffness is present: in the morning, all day  abdominal pain: Yes  bowel incontinence: Yes  chest pain: No  dysuria: No  fever: No  headaches: Yes  leg pain: No  numbness: No  paresis: No  paresthesias: No  pelvic pain: No  perianal numbness: No  tingling: No  weakness: No  weight loss: No  genital pain: No  hematuria: No  Risk factors: history of cancer, lack of exercise, menopause, poor posture, sedentary lifestyle  Pain severity: mild  Treatments tried: NSAIDs, home exercises  Improvement on treatment: mild  Subjective:       Patient ID: Carmen Manriquez is a 59 y.o. female.    Chief Complaint: Follow-up  59 year old presents for follow up. She has had some discomfort intermitantly right side and back. She has noticed over the last few months on and off. She has not been taking any anti inflammatory medications regularly. She gets pressure sensation at times when she sits but better when she moves   She denies vomiting had isolated nausea once. She has had no increased sob or cough . She quit but went back to smoking again  She has been taking vitamin d regularly since her level was low.    HPI  Review of Systems   Constitutional: Negative for fever.   Cardiovascular: Negative for chest pain.   Gastrointestinal: Positive for abdominal pain.   Genitourinary: Negative for dysuria, hematuria and pelvic pain.   Musculoskeletal: Positive for back pain.   Neurological: Positive for headaches. Negative for weakness and numbness.       Objective:     Blood pressure 108/82, pulse 77, temperature 97.9 °F (36.6 °C), height 5' 1" (1.549 m), weight 53.9 kg (118 lb 13.3 oz), SpO2 99 %.    Physical Exam "   Constitutional: No distress.   HENT:   Head: Normocephalic.   Mouth/Throat: Oropharynx is clear and moist.   Eyes: No scleral icterus.   Neck: Neck supple.   Cardiovascular: Normal rate, regular rhythm and normal heart sounds. Exam reveals no gallop and no friction rub.   No murmur heard.  Pulmonary/Chest: Effort normal and breath sounds normal. No respiratory distress.   Rare wheeze    Abdominal: Soft. Bowel sounds are normal. She exhibits no mass. There is no tenderness.   No rebound or guarding    Musculoskeletal: She exhibits no edema.   No flank pain     Neurological: She is alert.   Skin: No erythema.   Psychiatric: She has a normal mood and affect.   Vitals reviewed.      Assessment:       1. Hyperlipidemia, unspecified hyperlipidemia type    2. Abdominal pain, unspecified abdominal location    3. Vitamin D deficiency    4. Wheezing    5. Osteoarthritis, unspecified osteoarthritis type, unspecified site        Plan:       Carmen was seen today for follow-up.    Diagnoses and all orders for this visit:    Hyperlipidemia, unspecified hyperlipidemia type  Recheck and review fasting   -     Lipid panel; Future    Abdominal pain, unspecified abdominal location  Proceed with additional testing   -     Urinalysis  -     Urine culture  -     Basic metabolic panel; Future  -     CBC auto differential; Future  -     Hepatic function panel; Future  -     CT Abdomen Pelvis W Wo Contrast; Future  -     Amylase; Future  -     Lipase; Future    Vitamin D deficiency  Hx of continue fit d   -     Vitamin D; Future    Wheezing  Discussed tobacco cessation encouraged   -     X-Ray Chest PA And Lateral; Future    osteoarthirtis continue conservatve measures    Call if no impromvnet  Review test

## 2019-10-01 ENCOUNTER — HOSPITAL ENCOUNTER (OUTPATIENT)
Dept: RADIOLOGY | Facility: OTHER | Age: 59
Discharge: HOME | End: 2019-10-01
Attending: INTERNAL MEDICINE
Payer: COMMERCIAL

## 2019-10-01 ENCOUNTER — PATIENT MESSAGE (OUTPATIENT)
Dept: INTERNAL MEDICINE | Facility: CLINIC | Age: 59
End: 2019-10-01

## 2019-10-01 DIAGNOSIS — R10.9 ABDOMINAL PAIN, UNSPECIFIED ABDOMINAL LOCATION: ICD-10-CM

## 2019-10-01 LAB — BACTERIA UR CULT: NO GROWTH

## 2019-10-01 PROCEDURE — 74177 CT ABD & PELVIS W/CONTRAST: CPT | Mod: 26,,, | Performed by: RADIOLOGY

## 2019-10-01 PROCEDURE — 74177 CT ABD & PELVIS W/CONTRAST: CPT | Mod: TC

## 2019-10-01 PROCEDURE — 74177 CT ABDOMEN PELVIS WITH CONTRAST: ICD-10-PCS | Mod: 26,,, | Performed by: RADIOLOGY

## 2019-10-01 PROCEDURE — 25500020 PHARM REV CODE 255: Performed by: INTERNAL MEDICINE

## 2019-10-01 RX ADMIN — IOHEXOL 1000 ML: 9 SOLUTION ORAL at 01:10

## 2019-10-01 RX ADMIN — IOHEXOL 75 ML: 350 INJECTION, SOLUTION INTRAVENOUS at 02:10

## 2019-10-02 ENCOUNTER — TELEPHONE (OUTPATIENT)
Dept: INTERNAL MEDICINE | Facility: CLINIC | Age: 59
End: 2019-10-02

## 2019-11-10 RX ORDER — ERGOCALCIFEROL 1.25 MG/1
CAPSULE ORAL
Qty: 24 CAPSULE | Refills: 2 | Status: SHIPPED | OUTPATIENT
Start: 2019-11-10 | End: 2020-08-20

## 2020-05-13 DIAGNOSIS — Z12.39 BREAST CANCER SCREENING: ICD-10-CM

## 2020-10-05 ENCOUNTER — PATIENT MESSAGE (OUTPATIENT)
Dept: ADMINISTRATIVE | Facility: HOSPITAL | Age: 60
End: 2020-10-05

## 2020-11-18 ENCOUNTER — OFFICE VISIT (OUTPATIENT)
Dept: URGENT CARE | Facility: CLINIC | Age: 60
End: 2020-11-18
Payer: COMMERCIAL

## 2020-11-18 VITALS
WEIGHT: 118 LBS | OXYGEN SATURATION: 98 % | HEIGHT: 61 IN | HEART RATE: 85 BPM | SYSTOLIC BLOOD PRESSURE: 148 MMHG | RESPIRATION RATE: 20 BRPM | BODY MASS INDEX: 22.28 KG/M2 | TEMPERATURE: 98 F | DIASTOLIC BLOOD PRESSURE: 98 MMHG

## 2020-11-18 DIAGNOSIS — T78.40XA ALLERGIC REACTION, INITIAL ENCOUNTER: Primary | ICD-10-CM

## 2020-11-18 PROCEDURE — 96372 PR INJECTION,THERAP/PROPH/DIAG2ST, IM OR SUBCUT: ICD-10-PCS | Mod: S$GLB,,, | Performed by: FAMILY MEDICINE

## 2020-11-18 PROCEDURE — 96372 THER/PROPH/DIAG INJ SC/IM: CPT | Mod: S$GLB,,, | Performed by: FAMILY MEDICINE

## 2020-11-18 PROCEDURE — 99214 PR OFFICE/OUTPT VISIT, EST, LEVL IV, 30-39 MIN: ICD-10-PCS | Mod: 25,S$GLB,, | Performed by: FAMILY MEDICINE

## 2020-11-18 PROCEDURE — 3008F PR BODY MASS INDEX (BMI) DOCUMENTED: ICD-10-PCS | Mod: CPTII,S$GLB,, | Performed by: FAMILY MEDICINE

## 2020-11-18 PROCEDURE — 99214 OFFICE O/P EST MOD 30 MIN: CPT | Mod: 25,S$GLB,, | Performed by: FAMILY MEDICINE

## 2020-11-18 PROCEDURE — 3008F BODY MASS INDEX DOCD: CPT | Mod: CPTII,S$GLB,, | Performed by: FAMILY MEDICINE

## 2020-11-18 RX ORDER — PREDNISONE 20 MG/1
40 TABLET ORAL DAILY
Qty: 10 TABLET | Refills: 0 | Status: SHIPPED | OUTPATIENT
Start: 2020-11-19 | End: 2020-11-24

## 2020-11-18 RX ORDER — INFLUENZA A VIRUS A/VICTORIA/2454/2019 IVR-207 (H1N1) ANTIGEN (PROPIOLACTONE INACTIVATED), INFLUENZA A VIRUS A/HONG KONG/2671/2019 IVR-208 (H3N2) ANTIGEN (PROPIOLACTONE INACTIVATED), INFLUENZA B VIRUS B/VICTORIA/705/2018 BVR-11 ANTIGEN (PROPIOLACTONE INACTIVATED), INFLUENZA B VIRUS B/PHUKET/3073/2013 BVR-1B ANTIGEN (PROPIOLACTONE INACTIVATED) 15; 15; 15; 15 UG/.5ML; UG/.5ML; UG/.5ML; UG/.5ML
INJECTION, SUSPENSION INTRAMUSCULAR
COMMUNITY
Start: 2020-11-14 | End: 2020-12-02

## 2020-11-18 RX ORDER — BETAMETHASONE SODIUM PHOSPHATE AND BETAMETHASONE ACETATE 3; 3 MG/ML; MG/ML
6 INJECTION, SUSPENSION INTRA-ARTICULAR; INTRALESIONAL; INTRAMUSCULAR; SOFT TISSUE
Status: COMPLETED | OUTPATIENT
Start: 2020-11-18 | End: 2020-11-18

## 2020-11-18 RX ORDER — AMOXICILLIN 875 MG/1
TABLET, FILM COATED ORAL
COMMUNITY
Start: 2020-08-28 | End: 2020-12-02

## 2020-11-18 RX ORDER — METHOCARBAMOL 500 MG/1
TABLET, FILM COATED ORAL
COMMUNITY
Start: 2020-10-13 | End: 2020-12-02

## 2020-11-18 RX ORDER — OMEPRAZOLE 40 MG/1
CAPSULE, DELAYED RELEASE ORAL
COMMUNITY
Start: 2020-09-24 | End: 2020-12-02

## 2020-11-18 RX ADMIN — BETAMETHASONE SODIUM PHOSPHATE AND BETAMETHASONE ACETATE 6 MG: 3; 3 INJECTION, SUSPENSION INTRA-ARTICULAR; INTRALESIONAL; INTRAMUSCULAR; SOFT TISSUE at 07:11

## 2020-11-19 NOTE — PROGRESS NOTES
"Subjective:       Patient ID: Carmen Manriquez is a 60 y.o. female.    Vitals:  height is 5' 1" (1.549 m) and weight is 53.5 kg (118 lb). Her temperature is 97.9 °F (36.6 °C). Her blood pressure is 148/98 (abnormal) and her pulse is 85. Her respiration is 20 and oxygen saturation is 98%.     Chief Complaint: Rash    This is a 60 y.o. female   with Past Medical History:  No date: Abnormal Pap smear  No date: Allergy  No date: Endometriosis, moderate  No date: GERD (gastroesophageal reflux disease)  No date: Heart palpitations   and Past Surgical History:  No date: BILE DUCT EXPLORATION  No date:  SECTION, CLASSIC  No date: CHOLECYSTECTOMY  No date: ENDOMETRIAL ABLATION  No date: hx benign breast biopsy  No date: leep  No date: TONSILLECTOMY  No date: TUBAL LIGATION  who presents today with a chief complaint of a rash that she first noticed today. She's complaining itching on her hands, feet, legs and arms. She also had two bouts of diarrhea today. She hasn't used any medication to help relieve her symptoms.     Rash  This is a new problem. The current episode started today. The problem has been gradually worsening since onset. The affected locations include the left upper leg, left lower leg, right upper leg, right lower leg, torso, left arm and right arm. The rash is characterized by redness and itchiness. It is unknown if there was an exposure to a precipitant. Associated symptoms include diarrhea. Pertinent negatives include no cough, fever or sore throat. Past treatments include nothing.       Constitution: Negative for chills and fever.   HENT: Negative for facial swelling and sore throat.    Neck: Negative for painful lymph nodes.   Eyes: Negative for eye itching and eyelid swelling.   Respiratory: Negative for cough.    Gastrointestinal: Positive for diarrhea.   Musculoskeletal: Negative for joint pain and joint swelling.   Skin: Positive for rash and erythema. Negative for color change, pale, wound, " abrasion, laceration, lesion, skin thickening/induration, puncture wound, bruising, abscess, avulsion and hives.   Allergic/Immunologic: Negative for environmental allergies, immunocompromised state and hives.   Hematologic/Lymphatic: Negative for swollen lymph nodes.       Objective:      Physical Exam   Constitutional: She does not appear ill. No distress. normal  HENT:   Head: Normocephalic.   Cardiovascular: Normal rate, regular rhythm, normal heart sounds and normal pulses.   Pulmonary/Chest: Effort normal and breath sounds normal. She has no wheezes.   Abdominal: Normal appearance.   Neurological: She is alert.   Skin: Skin is warm and rash (diffuse paular lesion on body surface/hives). Lesions:  erythema  Nursing note and vitals reviewed.        Assessment:       1. Allergic reaction, initial encounter        Plan:         Allergic reaction, initial encounter  -     betamethasone acetate-betamethasone sodium phosphate injection 6 mg  -     predniSONE (DELTASONE) 20 MG tablet; Take 2 tablets (40 mg total) by mouth once daily. for 5 days  Dispense: 10 tablet; Refill: 0    advised use of benadryl and claritin.      To start oral steroids tomorrow if symptoms persists.

## 2020-11-19 NOTE — PATIENT INSTRUCTIONS
General Allergic Reactions  An allergic reaction is a set of symptoms caused by an allergen. An allergen is something that causes a persons immune system to react. When a person comes in contact with an allergen, it causes the body to release chemicals. These include the chemical histamine. Histamine causes swelling and itching. It may affect the entire body. This is called a general allergic reaction. Often symptoms affect only 1 part of the body. This is called a local allergic reaction.  You are having an allergic reaction. Almost anything can cause one. Different people are allergic to different things. It is usually something that you ate or swallowed, came into contact with by getting or putting it on your skin or clothes, or something you breathed in the air. This can be very annoying and sometimes scary.  Most of us think of allergic reactions when we have a rash or itchy skin. Symptoms can include:  · Itching of the eyes, nose, and roof of the mouth  · Runny or stuffy nose  · Watery eyes   · Sneezing or coughing   · A blocked feeling in the ear  · Red, itchy rash called hives  · Red and purple spots  · Rash, redness, welts, blisters  · Itching, burning, stinging, pain  · Dry, flaky, cracking, scaly skin  Severe symptoms include:  · Swelling of the face, lips, or other parts of the body  · Hoarse voice  · Trouble swallowing, feeling like your throat is closing  · Trouble breathing, wheezing  · Nausea, vomiting, diarrhea, stomach cramps  · Feeling faint or lightheaded, rapid heart rate  Sometimes the cause may be obvious. But there are so many things that can cause a reaction that you may not be able to figure out. The most important things to help find your allergen are:  · Remembering when it started  · What you were doing at the time or just before that  · Any activities you were involved in  · Any new products or contacts  Below are some common causes. But remember that almost anything can cause a  reaction. You may not even be aware that you came into contact with one of these things:  · Dust, mold, pollen  · Plants (common ones are poison ivy and poison oak, but there are many others)   · Animals  · Foods such as shrimp, shellfish, peanuts, milk products, gluten, and eggs. Also food colorings, flavorings, and additives.  · Insect bites or stings such as bees, mosquitos, fleas, ticks  · Medicines such as penicillin, sulfa medicines, amoxicillin, aspirin, and ibuprofen. But any medicine can cause a reaction.  · Jewelry such as nickel or gold. This can be new, or something youve worn for a while, including zippers and buttons.  · Latex such as in gloves, clothes, toys, balloons, or some tapes. Some people allergic to latex may also have problems with foods like bananas, avocados, kiwi, papaya, or chestnuts.  · Lotions, perfumes, cosmetics, soaps, shampoos, skincare products, nail products  · Chemicals or dyes in clothing, linen, , hair dyes, soaps, iodine  Many viruses and common colds can cause a rash that is not an allergic reaction. Sometimes it is hard to tell the difference between allergies, sensitivity, or an intolerance to something. This is especially true with food. Many things can cause diarrhea, vomiting, stomach cramps, and skin irritation.  Home care    The goal of treatment is to help relieve the symptoms and get you feeling better. The rash will usually fade over several days. But it can sometimes last a couple of weeks. Over the next couple of days, there may be times when it is gets a little worse, and then better again. Here are some things to do:  · If you know what you are allergic to, stay away from it. Future reactions could be worse than this one.  · Avoid tight clothing and anything that heats up your skin (hot showers or baths, direct sunlight). Heat will make itching worse.  · An ice pack will relieve local areas of intense itching and redness. To make an ice pack, put ice  cubes in a plastic bag that seals at the top. Wrap it in a thin, clean towel. Dont put the ice directly on the skin because it can damage the skin.  · Oral diphenhydramine is an over-the-counter antihistamine sold at pharmacy and grocery stores. Unless a prescription antihistamine was given, diphenhydramine may be used to reduce itching if large areas of the skin are involved. It may make you sleepy. So be careful using it in the daytime or when going to school, working, or driving. Note: Dont use diphenhydramine if you have glaucoma or if you are a man with trouble urinating due to an enlarged prostate. There are other antihistamines that wont make you so sleepy. These are good choices for daytime use. Ask your pharmacist for suggestions.  · Dont use diphenhydramine cream on your skin. It can cause a further reaction in some people.  · To help prevent an infection, don't scratch the affected area. Scratching may worsen the reaction and damage your skin. It can also lead to an infection. Always check the affected for signs of an infection.  · Call your healthcare provider and ask what you can use to help decrease the itching.  · To decrease allergic reactions, try the following:    · Use heat-steam to clean your home  · Use high-efficiency particulate (HEPA) vacuums and filters  · Stay away from food and pet triggers  · Kill any cockroaches  · Clean your house often  Follow-up care  Follow up with your healthcare provider, or as advised. If you had a severe reaction today, or if you have had several mild to medium allergic reactions in the past, ask your provider about allergy testing. This can help you find out what you are allergic to. If your reaction included dizziness, fainting, or trouble breathing or swallowing, ask your provider about carrying auto-injectable epinephrine.  Call 911  Call 911 if any of these occur:  · Trouble breathing or swallowing, wheezing  · Cool, moist, pale skin  · Shortness of  breath  · Hoarse voice or trouble speaking  · Confused   · Very drowsy or trouble awakening  · Fainting or loss of consciousness  · Rapid heart rate  · Feeling of dizziness or weakness or a sudden drop in blood pressure  · Feeling of doom  · Feeling lightheaded  · Severe nausea or vomiting, or diarrhea  · Seizure  · Swelling in the face, eyelids, lips, mouth, throat or tongue  · Drooling  When to seek medical advice  Call your healthcare provider right away if any of these occur:  · Spreading areas of itching, redness or swelling  · Nausea or stomach cramps or abdominal pain  · Continuing or recurring symptoms  · Spreading areas of redness, swelling, or itching  · Signs of infection at the affected site:  ¨ Spreading redness  ¨ Increased pain or swelling  ¨ Fluid or colored drainage from the site  ¨ Fever of 100.4°F (38°C) or above lasting for 24 to 48 hours, or as directed by your provider  Date Last Reviewed: 3/1/2017  © 6270-7222 [employer]. [address] [place] [address], [place] [address]. All rights reserved. This information is not intended as a substitute for professional medical care. Always follow your healthcare professional's instructions.

## 2020-12-02 ENCOUNTER — OFFICE VISIT (OUTPATIENT)
Dept: INTERNAL MEDICINE | Facility: CLINIC | Age: 60
End: 2020-12-02
Payer: COMMERCIAL

## 2020-12-02 ENCOUNTER — LAB VISIT (OUTPATIENT)
Dept: LAB | Facility: HOSPITAL | Age: 60
End: 2020-12-02
Attending: INTERNAL MEDICINE
Payer: COMMERCIAL

## 2020-12-02 VITALS
WEIGHT: 116.63 LBS | DIASTOLIC BLOOD PRESSURE: 80 MMHG | HEART RATE: 95 BPM | SYSTOLIC BLOOD PRESSURE: 120 MMHG | HEIGHT: 61 IN | TEMPERATURE: 98 F | OXYGEN SATURATION: 100 % | BODY MASS INDEX: 22.02 KG/M2

## 2020-12-02 DIAGNOSIS — F41.9 ANXIETY: Primary | ICD-10-CM

## 2020-12-02 DIAGNOSIS — E55.9 VITAMIN D DEFICIENCY: ICD-10-CM

## 2020-12-02 DIAGNOSIS — L50.9 HIVES: ICD-10-CM

## 2020-12-02 DIAGNOSIS — Z72.0 TOBACCO USE: ICD-10-CM

## 2020-12-02 DIAGNOSIS — R26.89 BALANCE DISORDER: ICD-10-CM

## 2020-12-02 DIAGNOSIS — R00.2 PALPITATIONS: ICD-10-CM

## 2020-12-02 DIAGNOSIS — R51.9 NONINTRACTABLE HEADACHE, UNSPECIFIED CHRONICITY PATTERN, UNSPECIFIED HEADACHE TYPE: ICD-10-CM

## 2020-12-02 LAB
25(OH)D3+25(OH)D2 SERPL-MCNC: 41 NG/ML (ref 30–96)
ALBUMIN SERPL BCP-MCNC: 4.3 G/DL (ref 3.5–5.2)
ALP SERPL-CCNC: 113 U/L (ref 55–135)
ALT SERPL W/O P-5'-P-CCNC: 12 U/L (ref 10–44)
ANION GAP SERPL CALC-SCNC: 11 MMOL/L (ref 8–16)
AST SERPL-CCNC: 17 U/L (ref 10–40)
BASOPHILS # BLD AUTO: 0.06 K/UL (ref 0–0.2)
BASOPHILS NFR BLD: 0.7 % (ref 0–1.9)
BILIRUB DIRECT SERPL-MCNC: 0.1 MG/DL (ref 0.1–0.3)
BILIRUB SERPL-MCNC: 0.3 MG/DL (ref 0.1–1)
BUN SERPL-MCNC: 11 MG/DL (ref 6–20)
CALCIUM SERPL-MCNC: 9.4 MG/DL (ref 8.7–10.5)
CHLORIDE SERPL-SCNC: 107 MMOL/L (ref 95–110)
CHOLEST SERPL-MCNC: 220 MG/DL (ref 120–199)
CHOLEST/HDLC SERPL: 3.4 {RATIO} (ref 2–5)
CO2 SERPL-SCNC: 27 MMOL/L (ref 23–29)
CREAT SERPL-MCNC: 0.7 MG/DL (ref 0.5–1.4)
DIFFERENTIAL METHOD: ABNORMAL
EOSINOPHIL # BLD AUTO: 0.1 K/UL (ref 0–0.5)
EOSINOPHIL NFR BLD: 1.1 % (ref 0–8)
ERYTHROCYTE [DISTWIDTH] IN BLOOD BY AUTOMATED COUNT: 13.2 % (ref 11.5–14.5)
EST. GFR  (AFRICAN AMERICAN): >60 ML/MIN/1.73 M^2
EST. GFR  (NON AFRICAN AMERICAN): >60 ML/MIN/1.73 M^2
GLUCOSE SERPL-MCNC: 99 MG/DL (ref 70–110)
HCT VFR BLD AUTO: 42.3 % (ref 37–48.5)
HDLC SERPL-MCNC: 65 MG/DL (ref 40–75)
HDLC SERPL: 29.5 % (ref 20–50)
HGB BLD-MCNC: 13.1 G/DL (ref 12–16)
IMM GRANULOCYTES # BLD AUTO: 0.04 K/UL (ref 0–0.04)
IMM GRANULOCYTES NFR BLD AUTO: 0.5 % (ref 0–0.5)
LDLC SERPL CALC-MCNC: 141.2 MG/DL (ref 63–159)
LYMPHOCYTES # BLD AUTO: 1.7 K/UL (ref 1–4.8)
LYMPHOCYTES NFR BLD: 20.3 % (ref 18–48)
MCH RBC QN AUTO: 28.9 PG (ref 27–31)
MCHC RBC AUTO-ENTMCNC: 31 G/DL (ref 32–36)
MCV RBC AUTO: 93 FL (ref 82–98)
MONOCYTES # BLD AUTO: 0.5 K/UL (ref 0.3–1)
MONOCYTES NFR BLD: 6.4 % (ref 4–15)
NEUTROPHILS # BLD AUTO: 5.9 K/UL (ref 1.8–7.7)
NEUTROPHILS NFR BLD: 71 % (ref 38–73)
NONHDLC SERPL-MCNC: 155 MG/DL
NRBC BLD-RTO: 0 /100 WBC
PLATELET # BLD AUTO: 274 K/UL (ref 150–350)
PMV BLD AUTO: 9.8 FL (ref 9.2–12.9)
POTASSIUM SERPL-SCNC: 4.2 MMOL/L (ref 3.5–5.1)
PROT SERPL-MCNC: 7.6 G/DL (ref 6–8.4)
RBC # BLD AUTO: 4.54 M/UL (ref 4–5.4)
SODIUM SERPL-SCNC: 145 MMOL/L (ref 136–145)
TRIGL SERPL-MCNC: 69 MG/DL (ref 30–150)
TSH SERPL DL<=0.005 MIU/L-ACNC: 1.18 UIU/ML (ref 0.4–4)
WBC # BLD AUTO: 8.34 K/UL (ref 3.9–12.7)

## 2020-12-02 PROCEDURE — 80061 LIPID PANEL: CPT

## 2020-12-02 PROCEDURE — 99999 PR PBB SHADOW E&M-EST. PATIENT-LVL IV: CPT | Mod: PBBFAC,,, | Performed by: INTERNAL MEDICINE

## 2020-12-02 PROCEDURE — 3008F PR BODY MASS INDEX (BMI) DOCUMENTED: ICD-10-PCS | Mod: CPTII,S$GLB,, | Performed by: INTERNAL MEDICINE

## 2020-12-02 PROCEDURE — 80076 HEPATIC FUNCTION PANEL: CPT

## 2020-12-02 PROCEDURE — 3008F BODY MASS INDEX DOCD: CPT | Mod: CPTII,S$GLB,, | Performed by: INTERNAL MEDICINE

## 2020-12-02 PROCEDURE — 99999 PR PBB SHADOW E&M-EST. PATIENT-LVL IV: ICD-10-PCS | Mod: PBBFAC,,, | Performed by: INTERNAL MEDICINE

## 2020-12-02 PROCEDURE — 80048 BASIC METABOLIC PNL TOTAL CA: CPT

## 2020-12-02 PROCEDURE — 84443 ASSAY THYROID STIM HORMONE: CPT

## 2020-12-02 PROCEDURE — 85025 COMPLETE CBC W/AUTO DIFF WBC: CPT

## 2020-12-02 PROCEDURE — 82306 VITAMIN D 25 HYDROXY: CPT

## 2020-12-02 PROCEDURE — 99214 PR OFFICE/OUTPT VISIT, EST, LEVL IV, 30-39 MIN: ICD-10-PCS | Mod: S$GLB,,, | Performed by: INTERNAL MEDICINE

## 2020-12-02 PROCEDURE — 99214 OFFICE O/P EST MOD 30 MIN: CPT | Mod: S$GLB,,, | Performed by: INTERNAL MEDICINE

## 2020-12-02 PROCEDURE — 36415 COLL VENOUS BLD VENIPUNCTURE: CPT

## 2020-12-02 RX ORDER — ESCITALOPRAM OXALATE 5 MG/1
5 TABLET ORAL DAILY
Qty: 30 TABLET | Refills: 4 | Status: SHIPPED | OUTPATIENT
Start: 2020-12-02 | End: 2021-03-09 | Stop reason: SDUPTHER

## 2020-12-02 RX ORDER — LORAZEPAM 0.5 MG/1
0.5 TABLET ORAL NIGHTLY PRN
Qty: 30 TABLET | Refills: 1 | Status: SHIPPED | OUTPATIENT
Start: 2020-12-02 | End: 2021-06-11

## 2020-12-02 NOTE — PROGRESS NOTES
Answers for HPI/ROS submitted by the patient on 11/29/2020   activity change: Yes  unexpected weight change: No  neck pain: Yes  hearing loss: No  rhinorrhea: No  trouble swallowing: No  eye discharge: No  visual disturbance: No  chest tightness: Yes  wheezing: No  chest pain: No  palpitations: Yes  blood in stool: No  constipation: No  vomiting: No  diarrhea: No  polydipsia: No  polyuria: No  difficulty urinating: No  hematuria: No  menstrual problem: No  dysuria: No  joint swelling: No  arthralgias: Yes  headaches: Yes  weakness: No  confusion: No  dysphoric mood: Yes  Subjective:       Patient ID: Carmen Manriquez is a 60 y.o. female.    Chief Complaint: Follow-up    this is a 60-year-old who presents today for follow-up.  Patient reports she is having increasing anxiety recently she has had some stress with COVID but is working virtually which has been good for her.  She reports that her son is going through a divorce and has been increasingly anxious regarding that she started to get some hives and recently went to urgent care visit where she had a steroid shot her symptoms resolved patient reports she realized the hives developed after conversation that was inducing stress so she now thinks the hives were related to that she denies any new medications or changes in her diet she has had no symptoms since that episode.  Patient reports that she had a history of anxiety when after she had her children she was on Wellbutrin for a while but had some difficulty weaning off of it she feels she would like to get back on a daily medicine her gynecologist as prescribed Ativan in the past for sleep or anxiety which she takes on rare occasion and would like new rx.  She has been taking it a bit more recently is in relation to her stress.  She reports sometimes she gets panicky at times she also gets palpitations which she feels related denies current palpitations or shortness of breath and she had a previous Holter last  "year.  Patient is realizing most of her symptoms she thinks related to the anxiety.  She also has issues with tinnitus and has a tube in her right ear follows with an outlying ENT doctor.  Patient reports that she has had headaches intermittently they seem to be getting is worse over time occasionally she feels like her balance isn't as good as it used to be.    HPI  Review of Systems   Constitutional: Positive for activity change. Negative for unexpected weight change.   HENT: Negative for hearing loss, rhinorrhea and trouble swallowing.    Eyes: Negative for discharge and visual disturbance.   Respiratory: Positive for chest tightness. Negative for wheezing.    Cardiovascular: Positive for palpitations. Negative for chest pain.   Gastrointestinal: Negative for blood in stool, constipation, diarrhea and vomiting.   Endocrine: Negative for polydipsia and polyuria.   Genitourinary: Negative for difficulty urinating, dysuria, hematuria and menstrual problem.   Musculoskeletal: Positive for arthralgias and neck pain. Negative for joint swelling.   Skin:        Recent hives    Neurological: Positive for headaches. Negative for weakness.   Psychiatric/Behavioral: Positive for dysphoric mood. Negative for confusion.       Objective:     Blood pressure 120/80, pulse 95, temperature 98.1 °F (36.7 °C), height 5' 1" (1.549 m), weight 52.9 kg (116 lb 10 oz), SpO2 100 %.    Physical Exam  Constitutional:       General: She is not in acute distress.  HENT:      Head: Normocephalic.      Comments: Tube right ear   Eyes:      General: No scleral icterus.  Neck:      Musculoskeletal: Neck supple.   Cardiovascular:      Rate and Rhythm: Normal rate and regular rhythm.      Heart sounds: Normal heart sounds. No murmur. No friction rub. No gallop.    Pulmonary:      Effort: Pulmonary effort is normal. No respiratory distress.      Breath sounds: Normal breath sounds.   Abdominal:      General: Bowel sounds are normal.      Palpations: " Abdomen is soft. There is no mass.      Tenderness: There is no abdominal tenderness.   Skin:     Findings: No erythema.   Neurological:      Mental Status: She is alert.         Assessment:       1. Anxiety    2. Palpitations    3. Vitamin D deficiency    4. Balance disorder    5. Nonintractable headache, unspecified chronicity pattern, unspecified headache type    6. Hives        Plan:       Carmen was seen today for follow-up.    Diagnoses and all orders for this visit:    Anxiety  Situational stressors with some depression we discussed counseling she declines she would like to consider medication will start Lexapro 5 mg and we can increase over time if needed short-term refill of her lorazepam for rare use    Palpitations  Will update blood work and review previous Holter reviewed and discussed proceed with additional testing if symptoms persist after treatment she would like to try to get her anxiety under control 1st  -     Basic Metabolic Panel; Future  -     CBC Auto Differential; Future  -     Hepatic Function Panel; Future  -     Lipid Panel; Future  -     TSH; Future    Vitamin D deficiency  -     Vitamin D; Future    Balance disorder  Nonintractable headache, unspecified chronicity pattern, unspecified headache type  Proceed with baseline CT for evaluation for her tinnitus and ear issues she will follow-up with her outlying ent   -     CT Head Without Contrast; Future    Hives  Intermittent episodes she relates to stress we discussed she can use low-dose/pediatric dose of zyrtec  If recurs we also discussed allergy evaluation patient feels may be related to stress but she will monitor her symptoms    Other orders  -     escitalopram oxalate (LEXAPRO) 5 MG Tab; Take 1 tablet (5 mg total) by mouth once daily.  -     LORazepam (ATIVAN) 0.5 MG tablet; Take 1 tablet (0.5 mg total) by mouth nightly as needed for Anxiety.    Smoking cessation recommended     Patient gets her mammograms with her outlying doctors  and will schedule she reports she alternates her imaging she will bring a copy     Follow-up is 10 -12 weeks on medication

## 2020-12-03 ENCOUNTER — HOSPITAL ENCOUNTER (OUTPATIENT)
Dept: RADIOLOGY | Facility: HOSPITAL | Age: 60
Discharge: HOME | End: 2020-12-03
Attending: INTERNAL MEDICINE
Payer: COMMERCIAL

## 2020-12-03 DIAGNOSIS — R26.89 BALANCE DISORDER: ICD-10-CM

## 2020-12-03 DIAGNOSIS — R51.9 NONINTRACTABLE HEADACHE, UNSPECIFIED CHRONICITY PATTERN, UNSPECIFIED HEADACHE TYPE: ICD-10-CM

## 2020-12-03 PROCEDURE — 70450 CT HEAD WITHOUT CONTRAST: ICD-10-PCS | Mod: 26,,, | Performed by: RADIOLOGY

## 2020-12-03 PROCEDURE — 70450 CT HEAD/BRAIN W/O DYE: CPT | Mod: 26,,, | Performed by: RADIOLOGY

## 2020-12-03 PROCEDURE — 70450 CT HEAD/BRAIN W/O DYE: CPT | Mod: TC

## 2020-12-04 RX ORDER — ERGOCALCIFEROL 1.25 MG/1
50000 CAPSULE ORAL
Qty: 12 CAPSULE | Refills: 2
Start: 2020-12-04 | End: 2021-11-03 | Stop reason: SDUPTHER

## 2020-12-24 DIAGNOSIS — Z12.31 OTHER SCREENING MAMMOGRAM: ICD-10-CM

## 2020-12-31 ENCOUNTER — CLINICAL SUPPORT (OUTPATIENT)
Dept: SMOKING CESSATION | Facility: CLINIC | Age: 60
End: 2020-12-31
Payer: COMMERCIAL

## 2020-12-31 DIAGNOSIS — F17.200 NICOTINE DEPENDENCE: Primary | ICD-10-CM

## 2020-12-31 PROCEDURE — 99404 PREV MED CNSL INDIV APPRX 60: CPT | Mod: S$GLB,,,

## 2020-12-31 PROCEDURE — 99999 PR PBB SHADOW E&M-EST. PATIENT-LVL I: CPT | Mod: PBBFAC,,,

## 2020-12-31 PROCEDURE — 99404 PR PREVENT COUNSEL,INDIV,60 MIN: ICD-10-PCS | Mod: S$GLB,,,

## 2020-12-31 PROCEDURE — 99999 PR PBB SHADOW E&M-EST. PATIENT-LVL I: ICD-10-PCS | Mod: PBBFAC,,,

## 2020-12-31 RX ORDER — IBUPROFEN 200 MG
1 TABLET ORAL DAILY
Qty: 28 PATCH | Refills: 0 | Status: SHIPPED | OUTPATIENT
Start: 2020-12-31 | End: 2021-01-07

## 2020-12-31 RX ORDER — ASPIRIN/CALCIUM CARB/MAGNESIUM 325 MG
4 TABLET ORAL
Qty: 144 LOZENGE | Refills: 0 | Status: SHIPPED | OUTPATIENT
Start: 2020-12-31 | End: 2021-11-29

## 2021-01-04 ENCOUNTER — PATIENT MESSAGE (OUTPATIENT)
Dept: ADMINISTRATIVE | Facility: HOSPITAL | Age: 61
End: 2021-01-04

## 2021-01-06 ENCOUNTER — CLINICAL SUPPORT (OUTPATIENT)
Dept: SMOKING CESSATION | Facility: CLINIC | Age: 61
End: 2021-01-06
Payer: COMMERCIAL

## 2021-01-06 DIAGNOSIS — F17.200 NICOTINE DEPENDENCE: Primary | ICD-10-CM

## 2021-01-06 PROCEDURE — 99404 PR PREVENT COUNSEL,INDIV,60 MIN: ICD-10-PCS | Mod: S$GLB,,,

## 2021-01-06 PROCEDURE — 99404 PREV MED CNSL INDIV APPRX 60: CPT | Mod: S$GLB,,,

## 2021-01-07 ENCOUNTER — CLINICAL SUPPORT (OUTPATIENT)
Dept: SMOKING CESSATION | Facility: CLINIC | Age: 61
End: 2021-01-07
Payer: COMMERCIAL

## 2021-01-07 DIAGNOSIS — F17.200 NICOTINE DEPENDENCE: Primary | ICD-10-CM

## 2021-01-07 PROCEDURE — 99407 BEHAV CHNG SMOKING > 10 MIN: CPT | Mod: S$GLB,,,

## 2021-01-07 PROCEDURE — 99407 PR TOBACCO USE CESSATION INTENSIVE >10 MINUTES: ICD-10-PCS | Mod: S$GLB,,,

## 2021-01-07 RX ORDER — NICOTINE 7MG/24HR
1 PATCH, TRANSDERMAL 24 HOURS TRANSDERMAL DAILY
Qty: 28 PATCH | Refills: 0 | Status: SHIPPED | OUTPATIENT
Start: 2021-01-07 | End: 2021-11-29

## 2021-01-13 ENCOUNTER — CLINICAL SUPPORT (OUTPATIENT)
Dept: SMOKING CESSATION | Facility: CLINIC | Age: 61
End: 2021-01-13
Payer: COMMERCIAL

## 2021-01-13 DIAGNOSIS — F17.200 NICOTINE DEPENDENCE: Primary | ICD-10-CM

## 2021-01-13 PROCEDURE — 99402 PREV MED CNSL INDIV APPRX 30: CPT | Mod: S$GLB,,,

## 2021-01-13 PROCEDURE — 99402 PR PREVENT COUNSEL,INDIV,30 MIN: ICD-10-PCS | Mod: S$GLB,,,

## 2021-01-15 ENCOUNTER — CLINICAL SUPPORT (OUTPATIENT)
Dept: SMOKING CESSATION | Facility: CLINIC | Age: 61
End: 2021-01-15
Payer: COMMERCIAL

## 2021-01-15 DIAGNOSIS — F17.200 NICOTINE DEPENDENCE: Primary | ICD-10-CM

## 2021-01-15 PROCEDURE — 99407 PR TOBACCO USE CESSATION INTENSIVE >10 MINUTES: ICD-10-PCS | Mod: S$GLB,,,

## 2021-01-15 PROCEDURE — 99407 BEHAV CHNG SMOKING > 10 MIN: CPT | Mod: S$GLB,,,

## 2021-01-27 ENCOUNTER — CLINICAL SUPPORT (OUTPATIENT)
Dept: SMOKING CESSATION | Facility: CLINIC | Age: 61
End: 2021-01-27
Payer: COMMERCIAL

## 2021-01-27 DIAGNOSIS — F17.200 NICOTINE DEPENDENCE: Primary | ICD-10-CM

## 2021-01-27 PROCEDURE — 99403 PR PREVENT COUNSEL,INDIV,45 MIN: ICD-10-PCS | Mod: S$GLB,,,

## 2021-01-27 PROCEDURE — 99403 PREV MED CNSL INDIV APPRX 45: CPT | Mod: S$GLB,,,

## 2021-02-10 ENCOUNTER — CLINICAL SUPPORT (OUTPATIENT)
Dept: SMOKING CESSATION | Facility: CLINIC | Age: 61
End: 2021-02-10
Payer: COMMERCIAL

## 2021-02-10 DIAGNOSIS — F17.200 NICOTINE DEPENDENCE: Primary | ICD-10-CM

## 2021-02-10 PROCEDURE — 99404 PR PREVENT COUNSEL,INDIV,60 MIN: ICD-10-PCS | Mod: S$GLB,,,

## 2021-02-10 PROCEDURE — 99404 PREV MED CNSL INDIV APPRX 60: CPT | Mod: S$GLB,,,

## 2021-02-23 ENCOUNTER — CLINICAL SUPPORT (OUTPATIENT)
Dept: SMOKING CESSATION | Facility: CLINIC | Age: 61
End: 2021-02-23
Payer: COMMERCIAL

## 2021-02-23 DIAGNOSIS — F17.200 NICOTINE DEPENDENCE: Primary | ICD-10-CM

## 2021-02-23 PROCEDURE — 99403 PREV MED CNSL INDIV APPRX 45: CPT | Mod: S$GLB,,,

## 2021-02-23 PROCEDURE — 99403 PR PREVENT COUNSEL,INDIV,45 MIN: ICD-10-PCS | Mod: S$GLB,,,

## 2021-02-23 RX ORDER — VARENICLINE TARTRATE 0.5 (11)-1
KIT ORAL
Qty: 53 TABLET | Refills: 0 | Status: SHIPPED | OUTPATIENT
Start: 2021-02-23 | End: 2021-03-22 | Stop reason: DRUGHIGH

## 2021-03-03 ENCOUNTER — CLINICAL SUPPORT (OUTPATIENT)
Dept: SMOKING CESSATION | Facility: CLINIC | Age: 61
End: 2021-03-03
Payer: COMMERCIAL

## 2021-03-03 DIAGNOSIS — F17.200 NICOTINE DEPENDENCE: Primary | ICD-10-CM

## 2021-03-03 PROCEDURE — 99403 PREV MED CNSL INDIV APPRX 45: CPT | Mod: S$GLB,,,

## 2021-03-03 PROCEDURE — 99403 PR PREVENT COUNSEL,INDIV,45 MIN: ICD-10-PCS | Mod: S$GLB,,,

## 2021-03-05 ENCOUNTER — IMMUNIZATION (OUTPATIENT)
Dept: PRIMARY CARE CLINIC | Facility: CLINIC | Age: 61
End: 2021-03-05
Payer: COMMERCIAL

## 2021-03-05 DIAGNOSIS — Z23 NEED FOR VACCINATION: Primary | ICD-10-CM

## 2021-03-05 PROCEDURE — 91303 PR SARSCOV2 VAC AD26 .5ML IM: ICD-10-PCS | Mod: S$GLB,,, | Performed by: INTERNAL MEDICINE

## 2021-03-05 PROCEDURE — 91303 PR SARSCOV2 VAC AD26 .5ML IM: CPT | Mod: S$GLB,,, | Performed by: INTERNAL MEDICINE

## 2021-03-05 PROCEDURE — 0031A PR IMMUNIZ ADMIN, SARS-COV-2 COVID-19 VACC, 5X10VP/0.5ML: CPT | Mod: CV19,S$GLB,, | Performed by: INTERNAL MEDICINE

## 2021-03-05 PROCEDURE — 0031A PR IMMUNIZ ADMIN, SARS-COV-2 COVID-19 VACC, 5X10VP/0.5ML: ICD-10-PCS | Mod: CV19,S$GLB,, | Performed by: INTERNAL MEDICINE

## 2021-03-22 ENCOUNTER — CLINICAL SUPPORT (OUTPATIENT)
Dept: SMOKING CESSATION | Facility: CLINIC | Age: 61
End: 2021-03-22
Payer: COMMERCIAL

## 2021-03-22 DIAGNOSIS — F17.200 NICOTINE DEPENDENCE: Primary | ICD-10-CM

## 2021-03-22 PROCEDURE — 99403 PREV MED CNSL INDIV APPRX 45: CPT | Mod: S$GLB,,,

## 2021-03-22 PROCEDURE — 99403 PR PREVENT COUNSEL,INDIV,45 MIN: ICD-10-PCS | Mod: S$GLB,,,

## 2021-03-22 RX ORDER — VARENICLINE TARTRATE 1 MG/1
1 TABLET, FILM COATED ORAL 2 TIMES DAILY
Qty: 56 TABLET | Refills: 0 | Status: SHIPPED | OUTPATIENT
Start: 2021-03-22 | End: 2021-04-28 | Stop reason: SDUPTHER

## 2021-04-05 ENCOUNTER — PATIENT MESSAGE (OUTPATIENT)
Dept: ADMINISTRATIVE | Facility: HOSPITAL | Age: 61
End: 2021-04-05

## 2021-04-13 ENCOUNTER — TELEPHONE (OUTPATIENT)
Dept: INTERNAL MEDICINE | Facility: CLINIC | Age: 61
End: 2021-04-13

## 2021-04-14 ENCOUNTER — CLINICAL SUPPORT (OUTPATIENT)
Dept: SMOKING CESSATION | Facility: CLINIC | Age: 61
End: 2021-04-14
Payer: COMMERCIAL

## 2021-04-14 DIAGNOSIS — F17.200 NICOTINE DEPENDENCE: Primary | ICD-10-CM

## 2021-04-14 PROCEDURE — 90853 PR GROUP PSYCHOTHERAPY: ICD-10-PCS | Mod: S$GLB,,,

## 2021-04-14 PROCEDURE — 90853 GROUP PSYCHOTHERAPY: CPT | Mod: S$GLB,,,

## 2021-04-28 ENCOUNTER — CLINICAL SUPPORT (OUTPATIENT)
Dept: SMOKING CESSATION | Facility: CLINIC | Age: 61
End: 2021-04-28
Payer: COMMERCIAL

## 2021-04-28 DIAGNOSIS — F17.200 NICOTINE DEPENDENCE: Primary | ICD-10-CM

## 2021-04-28 PROCEDURE — 99402 PR PREVENT COUNSEL,INDIV,30 MIN: ICD-10-PCS | Mod: S$GLB,,,

## 2021-04-28 PROCEDURE — 99402 PREV MED CNSL INDIV APPRX 30: CPT | Mod: S$GLB,,,

## 2021-04-28 RX ORDER — VARENICLINE TARTRATE 1 MG/1
1 TABLET, FILM COATED ORAL 2 TIMES DAILY
Qty: 56 TABLET | Refills: 0 | Status: SHIPPED | OUTPATIENT
Start: 2021-04-28 | End: 2021-05-19 | Stop reason: SDUPTHER

## 2021-05-19 ENCOUNTER — CLINICAL SUPPORT (OUTPATIENT)
Dept: SMOKING CESSATION | Facility: CLINIC | Age: 61
End: 2021-05-19
Payer: COMMERCIAL

## 2021-05-19 DIAGNOSIS — F17.200 NICOTINE DEPENDENCE: Primary | ICD-10-CM

## 2021-05-19 PROCEDURE — 99411 PR PREVENT COUNSEL,GROUP,30 MIN: ICD-10-PCS | Mod: S$GLB,,,

## 2021-05-19 PROCEDURE — 99411 PREVENTIVE COUNSELING GROUP: CPT | Mod: S$GLB,,,

## 2021-05-19 RX ORDER — VARENICLINE TARTRATE 1 MG/1
1 TABLET, FILM COATED ORAL 2 TIMES DAILY
Qty: 56 TABLET | Refills: 0 | Status: SHIPPED | OUTPATIENT
Start: 2021-05-19 | End: 2021-11-29

## 2021-06-08 ENCOUNTER — PATIENT MESSAGE (OUTPATIENT)
Dept: INTERNAL MEDICINE | Facility: CLINIC | Age: 61
End: 2021-06-08

## 2021-06-09 RX ORDER — ESCITALOPRAM OXALATE 10 MG/1
10 TABLET ORAL DAILY
Qty: 30 TABLET | Refills: 6 | Status: SHIPPED | OUTPATIENT
Start: 2021-06-09 | End: 2021-11-03 | Stop reason: SDUPTHER

## 2021-06-10 ENCOUNTER — CLINICAL SUPPORT (OUTPATIENT)
Dept: SMOKING CESSATION | Facility: CLINIC | Age: 61
End: 2021-06-10
Payer: COMMERCIAL

## 2021-06-10 DIAGNOSIS — F17.200 NICOTINE DEPENDENCE: Primary | ICD-10-CM

## 2021-06-10 PROCEDURE — 99401 PREV MED CNSL INDIV APPRX 15: CPT | Mod: S$GLB,,,

## 2021-06-10 PROCEDURE — 99401 PR PREVENT COUNSEL,INDIV,15 MIN: ICD-10-PCS | Mod: S$GLB,,,

## 2021-06-29 ENCOUNTER — CLINICAL SUPPORT (OUTPATIENT)
Dept: SMOKING CESSATION | Facility: CLINIC | Age: 61
End: 2021-06-29
Payer: COMMERCIAL

## 2021-06-29 DIAGNOSIS — F17.200 NICOTINE DEPENDENCE: Primary | ICD-10-CM

## 2021-06-29 PROCEDURE — 99402 PREV MED CNSL INDIV APPRX 30: CPT | Mod: S$GLB,,,

## 2021-06-29 PROCEDURE — 99402 PR PREVENT COUNSEL,INDIV,30 MIN: ICD-10-PCS | Mod: S$GLB,,,

## 2021-07-07 ENCOUNTER — PATIENT MESSAGE (OUTPATIENT)
Dept: ADMINISTRATIVE | Facility: HOSPITAL | Age: 61
End: 2021-07-07

## 2021-07-19 ENCOUNTER — CLINICAL SUPPORT (OUTPATIENT)
Dept: SMOKING CESSATION | Facility: CLINIC | Age: 61
End: 2021-07-19
Payer: COMMERCIAL

## 2021-07-19 DIAGNOSIS — F17.200 NICOTINE DEPENDENCE: Primary | ICD-10-CM

## 2021-07-19 PROCEDURE — 99402 PREV MED CNSL INDIV APPRX 30: CPT | Mod: S$GLB,,,

## 2021-07-19 PROCEDURE — 99402 PR PREVENT COUNSEL,INDIV,30 MIN: ICD-10-PCS | Mod: S$GLB,,,

## 2021-07-19 PROCEDURE — 99999 PR PBB SHADOW E&M-EST. PATIENT-LVL I: ICD-10-PCS | Mod: PBBFAC,,,

## 2021-07-19 PROCEDURE — 99999 PR PBB SHADOW E&M-EST. PATIENT-LVL I: CPT | Mod: PBBFAC,,,

## 2021-07-28 ENCOUNTER — CLINICAL SUPPORT (OUTPATIENT)
Dept: SMOKING CESSATION | Facility: CLINIC | Age: 61
End: 2021-07-28
Payer: COMMERCIAL

## 2021-07-28 DIAGNOSIS — F17.200 NICOTINE DEPENDENCE: Primary | ICD-10-CM

## 2021-07-28 PROCEDURE — 99407 BEHAV CHNG SMOKING > 10 MIN: CPT | Mod: S$GLB,,,

## 2021-07-28 PROCEDURE — 99407 PR TOBACCO USE CESSATION INTENSIVE >10 MINUTES: ICD-10-PCS | Mod: S$GLB,,,

## 2021-08-17 ENCOUNTER — CLINICAL SUPPORT (OUTPATIENT)
Dept: SMOKING CESSATION | Facility: CLINIC | Age: 61
End: 2021-08-17
Payer: COMMERCIAL

## 2021-08-17 DIAGNOSIS — F17.200 NICOTINE DEPENDENCE: Primary | ICD-10-CM

## 2021-08-17 PROCEDURE — 99402 PREV MED CNSL INDIV APPRX 30: CPT | Mod: S$GLB,,,

## 2021-08-17 PROCEDURE — 99402 PR PREVENT COUNSEL,INDIV,30 MIN: ICD-10-PCS | Mod: S$GLB,,,

## 2021-09-01 ENCOUNTER — PATIENT MESSAGE (OUTPATIENT)
Dept: INTERNAL MEDICINE | Facility: CLINIC | Age: 61
End: 2021-09-01

## 2021-09-01 DIAGNOSIS — F41.9 ANXIETY: Primary | ICD-10-CM

## 2021-09-03 RX ORDER — LORAZEPAM 0.5 MG/1
TABLET ORAL
Qty: 30 TABLET | Refills: 0 | Status: SHIPPED | OUTPATIENT
Start: 2021-09-03 | End: 2021-09-04 | Stop reason: SDUPTHER

## 2021-09-04 RX ORDER — LORAZEPAM 0.5 MG/1
TABLET ORAL
Qty: 30 TABLET | Refills: 0 | Status: SHIPPED | OUTPATIENT
Start: 2021-09-04 | End: 2021-11-03 | Stop reason: SDUPTHER

## 2021-10-01 ENCOUNTER — TELEPHONE (OUTPATIENT)
Dept: ADMINISTRATIVE | Facility: HOSPITAL | Age: 61
End: 2021-10-01

## 2021-10-04 ENCOUNTER — PATIENT MESSAGE (OUTPATIENT)
Dept: ADMINISTRATIVE | Facility: HOSPITAL | Age: 61
End: 2021-10-04

## 2021-11-29 ENCOUNTER — LAB VISIT (OUTPATIENT)
Dept: LAB | Facility: HOSPITAL | Age: 61
End: 2021-11-29
Payer: COMMERCIAL

## 2021-11-29 ENCOUNTER — OFFICE VISIT (OUTPATIENT)
Dept: INTERNAL MEDICINE | Facility: CLINIC | Age: 61
End: 2021-11-29
Payer: COMMERCIAL

## 2021-11-29 ENCOUNTER — TELEPHONE (OUTPATIENT)
Dept: INTERNAL MEDICINE | Facility: CLINIC | Age: 61
End: 2021-11-29

## 2021-11-29 VITALS
DIASTOLIC BLOOD PRESSURE: 76 MMHG | WEIGHT: 129.63 LBS | SYSTOLIC BLOOD PRESSURE: 118 MMHG | HEIGHT: 62 IN | HEART RATE: 93 BPM | BODY MASS INDEX: 23.85 KG/M2 | OXYGEN SATURATION: 97 %

## 2021-11-29 DIAGNOSIS — Z00.00 HEALTH CARE MAINTENANCE: Primary | ICD-10-CM

## 2021-11-29 DIAGNOSIS — Z91.89 AT HIGH RISK FOR BREAST CANCER: ICD-10-CM

## 2021-11-29 DIAGNOSIS — Z12.31 SCREENING MAMMOGRAM, ENCOUNTER FOR: ICD-10-CM

## 2021-11-29 DIAGNOSIS — Z00.00 HEALTH CARE MAINTENANCE: ICD-10-CM

## 2021-11-29 DIAGNOSIS — F41.9 ANXIETY: ICD-10-CM

## 2021-11-29 LAB
25(OH)D3+25(OH)D2 SERPL-MCNC: 25 NG/ML (ref 30–96)
ALBUMIN SERPL BCP-MCNC: 4.3 G/DL (ref 3.5–5.2)
ALP SERPL-CCNC: 98 U/L (ref 55–135)
ALT SERPL W/O P-5'-P-CCNC: 14 U/L (ref 10–44)
ANION GAP SERPL CALC-SCNC: 10 MMOL/L (ref 8–16)
AST SERPL-CCNC: 20 U/L (ref 10–40)
BASOPHILS # BLD AUTO: 0.08 K/UL (ref 0–0.2)
BASOPHILS NFR BLD: 1.2 % (ref 0–1.9)
BILIRUB SERPL-MCNC: 0.4 MG/DL (ref 0.1–1)
BUN SERPL-MCNC: 8 MG/DL (ref 8–23)
CALCIUM SERPL-MCNC: 9.9 MG/DL (ref 8.7–10.5)
CHLORIDE SERPL-SCNC: 103 MMOL/L (ref 95–110)
CHOLEST SERPL-MCNC: 242 MG/DL (ref 120–199)
CHOLEST/HDLC SERPL: 4.2 {RATIO} (ref 2–5)
CO2 SERPL-SCNC: 26 MMOL/L (ref 23–29)
CREAT SERPL-MCNC: 0.7 MG/DL (ref 0.5–1.4)
DIFFERENTIAL METHOD: ABNORMAL
EOSINOPHIL # BLD AUTO: 0.2 K/UL (ref 0–0.5)
EOSINOPHIL NFR BLD: 2.2 % (ref 0–8)
ERYTHROCYTE [DISTWIDTH] IN BLOOD BY AUTOMATED COUNT: 14.2 % (ref 11.5–14.5)
EST. GFR  (AFRICAN AMERICAN): >60 ML/MIN/1.73 M^2
EST. GFR  (NON AFRICAN AMERICAN): >60 ML/MIN/1.73 M^2
ESTIMATED AVG GLUCOSE: 114 MG/DL (ref 68–131)
GLUCOSE SERPL-MCNC: 79 MG/DL (ref 70–110)
HBA1C MFR BLD: 5.6 % (ref 4–5.6)
HCT VFR BLD AUTO: 41.9 % (ref 37–48.5)
HDLC SERPL-MCNC: 58 MG/DL (ref 40–75)
HDLC SERPL: 24 % (ref 20–50)
HGB BLD-MCNC: 13.3 G/DL (ref 12–16)
IMM GRANULOCYTES # BLD AUTO: 0.03 K/UL (ref 0–0.04)
IMM GRANULOCYTES NFR BLD AUTO: 0.4 % (ref 0–0.5)
LDLC SERPL CALC-MCNC: 146.4 MG/DL (ref 63–159)
LYMPHOCYTES # BLD AUTO: 2.3 K/UL (ref 1–4.8)
LYMPHOCYTES NFR BLD: 34.5 % (ref 18–48)
MCH RBC QN AUTO: 29 PG (ref 27–31)
MCHC RBC AUTO-ENTMCNC: 31.7 G/DL (ref 32–36)
MCV RBC AUTO: 91 FL (ref 82–98)
MONOCYTES # BLD AUTO: 0.5 K/UL (ref 0.3–1)
MONOCYTES NFR BLD: 8 % (ref 4–15)
NEUTROPHILS # BLD AUTO: 3.6 K/UL (ref 1.8–7.7)
NEUTROPHILS NFR BLD: 53.7 % (ref 38–73)
NONHDLC SERPL-MCNC: 184 MG/DL
NRBC BLD-RTO: 0 /100 WBC
PLATELET # BLD AUTO: 282 K/UL (ref 150–450)
PMV BLD AUTO: 9.6 FL (ref 9.2–12.9)
POTASSIUM SERPL-SCNC: 4.2 MMOL/L (ref 3.5–5.1)
PROT SERPL-MCNC: 7.6 G/DL (ref 6–8.4)
RBC # BLD AUTO: 4.59 M/UL (ref 4–5.4)
SODIUM SERPL-SCNC: 139 MMOL/L (ref 136–145)
TRIGL SERPL-MCNC: 188 MG/DL (ref 30–150)
TSH SERPL DL<=0.005 MIU/L-ACNC: 1.32 UIU/ML (ref 0.4–4)
WBC # BLD AUTO: 6.78 K/UL (ref 3.9–12.7)

## 2021-11-29 PROCEDURE — 99215 OFFICE O/P EST HI 40 MIN: CPT | Mod: S$GLB,,, | Performed by: PHYSICIAN ASSISTANT

## 2021-11-29 PROCEDURE — 99999 PR PBB SHADOW E&M-EST. PATIENT-LVL V: ICD-10-PCS | Mod: PBBFAC,,, | Performed by: PHYSICIAN ASSISTANT

## 2021-11-29 PROCEDURE — 82306 VITAMIN D 25 HYDROXY: CPT | Performed by: PHYSICIAN ASSISTANT

## 2021-11-29 PROCEDURE — 80053 COMPREHEN METABOLIC PANEL: CPT | Performed by: PHYSICIAN ASSISTANT

## 2021-11-29 PROCEDURE — 84443 ASSAY THYROID STIM HORMONE: CPT | Performed by: PHYSICIAN ASSISTANT

## 2021-11-29 PROCEDURE — 99999 PR PBB SHADOW E&M-EST. PATIENT-LVL V: CPT | Mod: PBBFAC,,, | Performed by: PHYSICIAN ASSISTANT

## 2021-11-29 PROCEDURE — 80061 LIPID PANEL: CPT | Performed by: PHYSICIAN ASSISTANT

## 2021-11-29 PROCEDURE — 83036 HEMOGLOBIN GLYCOSYLATED A1C: CPT | Performed by: PHYSICIAN ASSISTANT

## 2021-11-29 PROCEDURE — 36415 COLL VENOUS BLD VENIPUNCTURE: CPT | Performed by: PHYSICIAN ASSISTANT

## 2021-11-29 PROCEDURE — 85025 COMPLETE CBC W/AUTO DIFF WBC: CPT | Performed by: PHYSICIAN ASSISTANT

## 2021-11-29 PROCEDURE — 99215 PR OFFICE/OUTPT VISIT, EST, LEVL V, 40-54 MIN: ICD-10-PCS | Mod: S$GLB,,, | Performed by: PHYSICIAN ASSISTANT

## 2021-11-29 RX ORDER — ALPRAZOLAM 0.25 MG/1
0.25 TABLET ORAL NIGHTLY PRN
Qty: 30 TABLET | Refills: 0 | Status: SHIPPED | OUTPATIENT
Start: 2021-11-29 | End: 2022-02-08 | Stop reason: SDUPTHER

## 2021-11-30 ENCOUNTER — PATIENT MESSAGE (OUTPATIENT)
Dept: INTERNAL MEDICINE | Facility: CLINIC | Age: 61
End: 2021-11-30
Payer: COMMERCIAL

## 2021-12-06 ENCOUNTER — TELEPHONE (OUTPATIENT)
Dept: HEMATOLOGY/ONCOLOGY | Facility: CLINIC | Age: 61
End: 2021-12-06
Payer: COMMERCIAL

## 2022-01-11 ENCOUNTER — CLINICAL SUPPORT (OUTPATIENT)
Dept: SMOKING CESSATION | Facility: CLINIC | Age: 62
End: 2022-01-11
Payer: COMMERCIAL

## 2022-01-11 DIAGNOSIS — F17.200 NICOTINE DEPENDENCE: Primary | ICD-10-CM

## 2022-01-11 PROCEDURE — 99407 BEHAV CHNG SMOKING > 10 MIN: CPT | Mod: S$GLB,,,

## 2022-01-11 PROCEDURE — 99407 PR TOBACCO USE CESSATION INTENSIVE >10 MINUTES: ICD-10-PCS | Mod: S$GLB,,,

## 2022-01-11 NOTE — PROGRESS NOTES
Spoke with patient's  today in regard to smoking cessation progress for 12-month telephone follow up.   states patient is tobacco free. Commended patient on their quit. Informed  of benefit period, future follow up, and contact information if any further help or support is needed. Will complete smart form for 12 month follow up on Quit attempt #2.

## 2022-01-13 ENCOUNTER — OFFICE VISIT (OUTPATIENT)
Dept: HEMATOLOGY/ONCOLOGY | Facility: CLINIC | Age: 62
End: 2022-01-13
Payer: COMMERCIAL

## 2022-01-13 VITALS
WEIGHT: 129.63 LBS | SYSTOLIC BLOOD PRESSURE: 121 MMHG | HEART RATE: 81 BPM | HEIGHT: 61 IN | DIASTOLIC BLOOD PRESSURE: 69 MMHG | BODY MASS INDEX: 24.47 KG/M2

## 2022-01-13 DIAGNOSIS — Z12.39 BREAST CANCER SCREENING, HIGH RISK PATIENT: ICD-10-CM

## 2022-01-13 DIAGNOSIS — Z91.89 AT HIGH RISK FOR BREAST CANCER: ICD-10-CM

## 2022-01-13 DIAGNOSIS — Z80.3 FAMILY HISTORY OF BREAST CANCER: ICD-10-CM

## 2022-01-13 DIAGNOSIS — R92.30 DENSE BREASTS: ICD-10-CM

## 2022-01-13 DIAGNOSIS — Z91.89 INCREASED RISK OF BREAST CANCER: Primary | ICD-10-CM

## 2022-01-13 DIAGNOSIS — Z12.31 SCREENING MAMMOGRAM, ENCOUNTER FOR: ICD-10-CM

## 2022-01-13 PROCEDURE — 99204 OFFICE O/P NEW MOD 45 MIN: CPT | Mod: S$GLB,,, | Performed by: PHYSICIAN ASSISTANT

## 2022-01-13 PROCEDURE — 99204 PR OFFICE/OUTPT VISIT, NEW, LEVL IV, 45-59 MIN: ICD-10-PCS | Mod: S$GLB,,, | Performed by: PHYSICIAN ASSISTANT

## 2022-01-13 PROCEDURE — 3008F PR BODY MASS INDEX (BMI) DOCUMENTED: ICD-10-PCS | Mod: CPTII,S$GLB,, | Performed by: PHYSICIAN ASSISTANT

## 2022-01-13 PROCEDURE — 1159F PR MEDICATION LIST DOCUMENTED IN MEDICAL RECORD: ICD-10-PCS | Mod: CPTII,S$GLB,, | Performed by: PHYSICIAN ASSISTANT

## 2022-01-13 PROCEDURE — 3078F PR MOST RECENT DIASTOLIC BLOOD PRESSURE < 80 MM HG: ICD-10-PCS | Mod: CPTII,S$GLB,, | Performed by: PHYSICIAN ASSISTANT

## 2022-01-13 PROCEDURE — 1160F PR REVIEW ALL MEDS BY PRESCRIBER/CLIN PHARMACIST DOCUMENTED: ICD-10-PCS | Mod: CPTII,S$GLB,, | Performed by: PHYSICIAN ASSISTANT

## 2022-01-13 PROCEDURE — 3074F PR MOST RECENT SYSTOLIC BLOOD PRESSURE < 130 MM HG: ICD-10-PCS | Mod: CPTII,S$GLB,, | Performed by: PHYSICIAN ASSISTANT

## 2022-01-13 PROCEDURE — 3078F DIAST BP <80 MM HG: CPT | Mod: CPTII,S$GLB,, | Performed by: PHYSICIAN ASSISTANT

## 2022-01-13 PROCEDURE — 3074F SYST BP LT 130 MM HG: CPT | Mod: CPTII,S$GLB,, | Performed by: PHYSICIAN ASSISTANT

## 2022-01-13 PROCEDURE — 99999 PR PBB SHADOW E&M-EST. PATIENT-LVL IV: CPT | Mod: PBBFAC,,, | Performed by: PHYSICIAN ASSISTANT

## 2022-01-13 PROCEDURE — 3008F BODY MASS INDEX DOCD: CPT | Mod: CPTII,S$GLB,, | Performed by: PHYSICIAN ASSISTANT

## 2022-01-13 PROCEDURE — 1160F RVW MEDS BY RX/DR IN RCRD: CPT | Mod: CPTII,S$GLB,, | Performed by: PHYSICIAN ASSISTANT

## 2022-01-13 PROCEDURE — 1159F MED LIST DOCD IN RCRD: CPT | Mod: CPTII,S$GLB,, | Performed by: PHYSICIAN ASSISTANT

## 2022-01-13 PROCEDURE — 99999 PR PBB SHADOW E&M-EST. PATIENT-LVL IV: ICD-10-PCS | Mod: PBBFAC,,, | Performed by: PHYSICIAN ASSISTANT

## 2022-01-13 NOTE — PROGRESS NOTES
History:     Reason For Consultation:   Increased lifetime risk of breast cancer    Referring Provider:   Delores Cadena PA-C  [id number] [place] [address]      HPI:   aCrmen Manriquez presents for consultation of increased risk of breast cancer. She is postmenopausal. Previously seeing Dr. Leiva at [facility] for high risk of breast cancer. History of atypical ductal hyperplasia in the left breast in  s/p excision. She did not have chemoprevention.  has been battling pancreatic cancer, so breast imaging has been delayed. Has not had imaging since bilateral diagnostic with bilateral US on 10/23/2019 at [facility]. Denies breast mass, changes in skin or nipple, or drainage.    Personal history of cancer: no  Prior chest wall radiation at ages 10-30: no  Genetic testing: None  Ashkenazi inheritance: no  OB/Gyn history:    Number of pregnancies & age at first gestation: ; First live birth at 22 y/o   Age of menarche: 13 y/o  Age of menopause: 43 y/o   Body mass index is 24.49 kg/m².   HRT Use: E/P x about 1 year at at 44. Stopped at age 45   Breastfeeding: Yes   Number of previous biopsies: 2015- Atypical ductal hyperplasia s/p excision at [facility]   Breast Density: heterogeneously dense    Family History:  PGM- Breast cancer at 65 ()  MGM- Cervical cancer  Brother- Gallbladder cancer    Daviser-Yahirzick Score:   30.1% (re-calculated in clinic today).   Elva Model 5 Year Risk: 3.0%    Past Medical   Past Medical History:   Diagnosis Date    Abnormal Pap smear     Allergy     Endometriosis, moderate     GERD (gastroesophageal reflux disease)     Heart palpitations      Patient Active Problem List   Diagnosis    Anxiety    Heart palpitations    Positive ALEXUS (antinuclear antibody)    Raynaud's phenomenon without gangrene    Acute dysfunction of eustachian tube    Pain of both hip joints    Chronic idiopathic constipation       Social History   Social History     Tobacco Use     Smoking status: Former Smoker     Packs/day: 0.50     Quit date: 2021     Years since quittin.7    Smokeless tobacco: Never Used    Tobacco comment: smoker since age 14, used to smoke 1 ppd   Substance Use Topics    Alcohol use: Yes     Alcohol/week: 4.0 standard drinks     Types: 4 Cans of beer per week    Drug use: No       Family History  Family History   Problem Relation Age of Onset    Diabetes Mother     Cancer Brother         gallbladder ca    Alcohol abuse Father     Breast cancer Paternal Grandmother     Cancer Paternal Grandmother         breast    Cervical cancer Maternal Grandmother     Cancer Maternal Grandmother         cervical    Heart disease Maternal Uncle     Kidney disease Sister         nephrotic syndrome    No Known Problems Son     No Known Problems Sister     Colon cancer Neg Hx     Ovarian cancer Neg Hx        Medications    Current Outpatient Medications:     collagen, bovine, 100 % Powd, , Disp: , Rfl:     ergocalciferol (ERGOCALCIFEROL) 50,000 unit Cap, Take 1 capsule (50,000 Units total) by mouth every 7 days., Disp: 12 capsule, Rfl: 2    EScitalopram oxalate (LEXAPRO) 10 MG tablet, Take 1 tablet (10 mg total) by mouth once daily., Disp: 30 tablet, Rfl: 6    ALPRAZolam (XANAX) 0.25 MG tablet, Take 1 tablet (0.25 mg total) by mouth nightly as needed for Anxiety., Disp: 30 tablet, Rfl: 0    butalbital-acetaminophen-caffeine -40 mg (FIORICET, ESGIC) -40 mg per tablet, take 1 tablet by mouth every 4 hours if needed for headache, Disp: , Rfl: 0    Allergies  Review of patient's allergies indicates:   Allergen Reactions    Ciprofloxacin Hives    Sulfa (sulfonamide antibiotics) Other (See Comments)     Cp        Review of Systems  General: No fever, chills, or weight loss.  Chest: No chest pain, shortness of breath, or palpitations.  Breast: No pain, masses, or nipple discharge.  Vulva: No pain, lesions, or itching.  Vagina: No relaxation, itching,  "discharge, or lesions.  Abdomen: No pain, nausea, vomiting, diarrhea, or constipation.  Urinary: No incontinence, nocturia, frequency, or dysuria.  Extremities:  No leg cramps, edema, or calf pain.  Neurologic: No headaches, dizziness, or visual changes.    Objective:     Vitals:    01/13/22 0902   BP: 121/69   Pulse: 81   Weight: 58.8 kg (129 lb 10.1 oz)   Height: 5' 1" (1.549 m)     Body mass index is 24.49 kg/m².    GENERAL:  Well-developed, well-nourished female in no acute distress. Vital signs reviewed.   SKIN:  Warm, dry without rashes, purpura or petechiae.  EYES:  EOMs intact.  Conjunctivae normal.  HEAD:  Normocephalic.  EARS/NOSE/MOUTH/THROAT:  Hearing intact.   NECK:  Supple with good range of motion; no masses  PSYCHIATRIC:  Normal affect and mood.  Alert and Oriented x 3.   BREASTS:Symmetrical, no skin changes or visible lesions.  No palpable masses, nipple discharge bilaterally.    BREAST IMAGING  Mammogram: 10/23/2019 with bilateral US at [facility]  MRI: 10/26/2018 at [facility]    Assessment:     Increased risk of breast cancer: This is a 61 y.o. female with an increased risk of breast cancer based on Tyrer Cuzick score of 30.1%.  We discussed that she would benefit from annual MRI's in addition to mammograms, alternating imaging every 6 months until age 75.  Pros and cons of MRI screening were discussed.  She would like to proceed with additional screening with breast MRI. Will start with screening mammogram since overdue.     Plan:   Schedule mammogram now.  Breast MRI in 7/2022, 6 months after her mammogram.  I also recommended two physical exams per year, one can be with her OB/GYN.      Risk reduction options with chemoprevention such as Tamoxifen or Raloxifene were discussed.  These have been shown to lower the risk of breast cancer incidence, however there is no survival benefit in patients who don't have breast cancer.  I explained the most common side effects and risks including hot flashes, " thromboembolism, stroke, endometrial cancer, weight changes, and pain. She declines Chemoprevention at this time.    Reviewed lifestyle modifications that have shown benefit of reducing breast cancer risk.   Including: Limit alcohol to less than one drink per day, Exercise at least 150 minutes per week of moderate intensity aerobic activity or at least 75 minutes of vigorous activity, Maintaining a healthy weight, Limit red meat to no more than 2-3x per week, Reduce processed foods and processed meat. Also encouraged wide variety of fruits and vegetables, specifically cruciferous vegetables.    Follow up in 1 year for CBE.     I spent a total of 45 minutes on the day of the visit.This includes face to face time and non-face to face time preparing to see the patient (eg, review of tests), obtaining and/or reviewing separately obtained history, documenting clinical information in the electronic or other health record, independently interpreting results and communicating results to the patient/family/caregiver, or care coordinator.

## 2022-01-26 ENCOUNTER — PATIENT MESSAGE (OUTPATIENT)
Dept: ADMINISTRATIVE | Facility: HOSPITAL | Age: 62
End: 2022-01-26
Payer: COMMERCIAL

## 2022-02-02 ENCOUNTER — HOSPITAL ENCOUNTER (OUTPATIENT)
Dept: RADIOLOGY | Facility: HOSPITAL | Age: 62
Discharge: HOME | End: 2022-02-02
Attending: PHYSICIAN ASSISTANT
Payer: COMMERCIAL

## 2022-02-02 VITALS — BODY MASS INDEX: 23.6 KG/M2 | WEIGHT: 125 LBS | HEIGHT: 61 IN

## 2022-02-02 DIAGNOSIS — Z12.31 SCREENING MAMMOGRAM, ENCOUNTER FOR: ICD-10-CM

## 2022-02-02 PROCEDURE — 77067 SCR MAMMO BI INCL CAD: CPT | Mod: TC

## 2022-02-02 PROCEDURE — 77063 BREAST TOMOSYNTHESIS BI: CPT | Mod: TC

## 2022-02-02 PROCEDURE — 77063 MAMMO DIGITAL SCREENING BILAT WITH TOMO: ICD-10-PCS | Mod: 26,,, | Performed by: RADIOLOGY

## 2022-02-02 PROCEDURE — 77067 MAMMO DIGITAL SCREENING BILAT WITH TOMO: ICD-10-PCS | Mod: 26,,, | Performed by: RADIOLOGY

## 2022-02-02 PROCEDURE — 77063 BREAST TOMOSYNTHESIS BI: CPT | Mod: 26,,, | Performed by: RADIOLOGY

## 2022-02-02 PROCEDURE — 77067 SCR MAMMO BI INCL CAD: CPT | Mod: 26,,, | Performed by: RADIOLOGY

## 2022-02-08 DIAGNOSIS — F41.9 ANXIETY: ICD-10-CM

## 2022-02-08 RX ORDER — ALPRAZOLAM 0.25 MG/1
0.25 TABLET ORAL NIGHTLY PRN
Qty: 30 TABLET | Refills: 0 | Status: SHIPPED | OUTPATIENT
Start: 2022-02-08 | End: 2022-04-04 | Stop reason: ALTCHOICE

## 2022-02-09 ENCOUNTER — PATIENT MESSAGE (OUTPATIENT)
Dept: INTERNAL MEDICINE | Facility: CLINIC | Age: 62
End: 2022-02-09
Payer: COMMERCIAL

## 2022-02-18 ENCOUNTER — PATIENT MESSAGE (OUTPATIENT)
Dept: INTERNAL MEDICINE | Facility: CLINIC | Age: 62
End: 2022-02-18
Payer: COMMERCIAL

## 2022-02-21 DIAGNOSIS — M79.673 PAIN OF FOOT, UNSPECIFIED LATERALITY: Primary | ICD-10-CM

## 2022-03-06 ENCOUNTER — PATIENT MESSAGE (OUTPATIENT)
Dept: INTERNAL MEDICINE | Facility: CLINIC | Age: 62
End: 2022-03-06
Payer: COMMERCIAL

## 2022-03-07 DIAGNOSIS — R20.2 PARESTHESIA: Primary | ICD-10-CM

## 2022-03-15 ENCOUNTER — IMMUNIZATION (OUTPATIENT)
Dept: INTERNAL MEDICINE | Facility: CLINIC | Age: 62
End: 2022-03-15
Payer: COMMERCIAL

## 2022-03-15 DIAGNOSIS — Z23 NEED FOR VACCINATION: Primary | ICD-10-CM

## 2022-03-15 PROCEDURE — 0034A COVID-19,VECTOR-NR,RS-AD26,PF,0.5 ML DOSE VACCINE (JANSSEN): CPT | Mod: CV19,PBBFAC | Performed by: INTERNAL MEDICINE

## 2022-03-25 ENCOUNTER — OFFICE VISIT (OUTPATIENT)
Dept: PODIATRY | Facility: CLINIC | Age: 62
End: 2022-03-25
Payer: COMMERCIAL

## 2022-03-25 VITALS
HEIGHT: 61 IN | HEART RATE: 84 BPM | BODY MASS INDEX: 24.55 KG/M2 | SYSTOLIC BLOOD PRESSURE: 112 MMHG | RESPIRATION RATE: 18 BRPM | WEIGHT: 130.06 LBS | DIASTOLIC BLOOD PRESSURE: 72 MMHG

## 2022-03-25 DIAGNOSIS — M72.2 PLANTAR FASCIITIS: ICD-10-CM

## 2022-03-25 DIAGNOSIS — G60.9 IDIOPATHIC PERIPHERAL NEUROPATHY: ICD-10-CM

## 2022-03-25 DIAGNOSIS — G57.53 TARSAL TUNNEL SYNDROME OF BOTH LOWER EXTREMITIES: Primary | ICD-10-CM

## 2022-03-25 PROCEDURE — 1159F PR MEDICATION LIST DOCUMENTED IN MEDICAL RECORD: ICD-10-PCS | Mod: CPTII,S$GLB,, | Performed by: PODIATRIST

## 2022-03-25 PROCEDURE — 1160F RVW MEDS BY RX/DR IN RCRD: CPT | Mod: CPTII,S$GLB,, | Performed by: PODIATRIST

## 2022-03-25 PROCEDURE — 3074F PR MOST RECENT SYSTOLIC BLOOD PRESSURE < 130 MM HG: ICD-10-PCS | Mod: CPTII,S$GLB,, | Performed by: PODIATRIST

## 2022-03-25 PROCEDURE — 3074F SYST BP LT 130 MM HG: CPT | Mod: CPTII,S$GLB,, | Performed by: PODIATRIST

## 2022-03-25 PROCEDURE — 3008F PR BODY MASS INDEX (BMI) DOCUMENTED: ICD-10-PCS | Mod: CPTII,S$GLB,, | Performed by: PODIATRIST

## 2022-03-25 PROCEDURE — 99203 PR OFFICE/OUTPT VISIT, NEW, LEVL III, 30-44 MIN: ICD-10-PCS | Mod: S$GLB,,, | Performed by: PODIATRIST

## 2022-03-25 PROCEDURE — 1159F MED LIST DOCD IN RCRD: CPT | Mod: CPTII,S$GLB,, | Performed by: PODIATRIST

## 2022-03-25 PROCEDURE — 99999 PR PBB SHADOW E&M-EST. PATIENT-LVL III: ICD-10-PCS | Mod: PBBFAC,,, | Performed by: PODIATRIST

## 2022-03-25 PROCEDURE — 1160F PR REVIEW ALL MEDS BY PRESCRIBER/CLIN PHARMACIST DOCUMENTED: ICD-10-PCS | Mod: CPTII,S$GLB,, | Performed by: PODIATRIST

## 2022-03-25 PROCEDURE — 3008F BODY MASS INDEX DOCD: CPT | Mod: CPTII,S$GLB,, | Performed by: PODIATRIST

## 2022-03-25 PROCEDURE — 99203 OFFICE O/P NEW LOW 30 MIN: CPT | Mod: S$GLB,,, | Performed by: PODIATRIST

## 2022-03-25 PROCEDURE — 3078F DIAST BP <80 MM HG: CPT | Mod: CPTII,S$GLB,, | Performed by: PODIATRIST

## 2022-03-25 PROCEDURE — 99999 PR PBB SHADOW E&M-EST. PATIENT-LVL III: CPT | Mod: PBBFAC,,, | Performed by: PODIATRIST

## 2022-03-25 PROCEDURE — 3078F PR MOST RECENT DIASTOLIC BLOOD PRESSURE < 80 MM HG: ICD-10-PCS | Mod: CPTII,S$GLB,, | Performed by: PODIATRIST

## 2022-03-25 RX ORDER — DICLOFENAC SODIUM 10 MG/G
2 GEL TOPICAL 4 TIMES DAILY PRN
Qty: 100 G | Refills: 5 | Status: SHIPPED | OUTPATIENT
Start: 2022-03-25 | End: 2022-06-16

## 2022-03-25 RX ORDER — GABAPENTIN 100 MG/1
100 CAPSULE ORAL NIGHTLY
Qty: 30 CAPSULE | Refills: 11 | Status: SHIPPED | OUTPATIENT
Start: 2022-03-25 | End: 2022-06-16

## 2022-03-25 NOTE — PROGRESS NOTES
PODIATRY NOTE     PATIENT ID:  Carmen Manriquez is a 61 y.o. female.     CHIEF CONCERN:   Foot Pain (Plantar pain ) and Foot Problem (Pain goes up to the legs, feels like it burns  )           MEDICAL DECISION MAKING:        ICD-10-CM ICD-9-CM    1. Tarsal tunnel syndrome of both lower extremities  G57.53 355.5 EMG W/ ULTRASOUND AND NERVE CONDUCTION TEST 2 Extremities   2. Idiopathic peripheral neuropathy  G60.9 356.9 EMG W/ ULTRASOUND AND NERVE CONDUCTION TEST 2 Extremities   3. Plantar fasciitis  M72.2 728.71 diclofenac sodium (VOLTAREN) 1 % Gel       · I counseled the patient on the patient's conditions, their implications and medical management.  Possible tarsal tunnel/neuropathy with plantar fasciitis.  · EMG ordered.    Follow-up after done.  · Meds as ordered.  · Stretching exercises.  · Avoid barefoot walking.  Avoid sandals.  Consider insoles in athletic shoes.                    HISTORY OF PRESENT ILLNESS:  Carmen Manriquez is a 61 y.o. female with concerns regarding:  Bilateral foot pain.  She notes terrible pain in her feet in the past few months.  She reports that her feet burn all day.  She describes it as a tingling sensation.  Worse in the balls of her feet and on the heels.  She states that she can not stand her sheets to touch the feet.  First steps in the morning also painful.  She does not recall any acute trauma to her feet.      Patient Active Problem List   Diagnosis    Anxiety    Heart palpitations    Positive ALEXUS (antinuclear antibody)    Raynaud's phenomenon without gangrene    Acute dysfunction of eustachian tube    Pain of both hip joints    Chronic idiopathic constipation           Current Outpatient Medications on File Prior to Visit   Medication Sig Dispense Refill    collagen, bovine, 100 % Powd       ergocalciferol (ERGOCALCIFEROL) 50,000 unit Cap Take 1 capsule (50,000 Units total) by mouth every 7 days. 12 capsule 2    EScitalopram oxalate (LEXAPRO) 10 MG tablet Take 1 tablet  "(10 mg total) by mouth once daily. 30 tablet 6    ALPRAZolam (XANAX) 0.25 MG tablet Take 1 tablet (0.25 mg total) by mouth nightly as needed for Anxiety. 30 tablet 0    butalbital-acetaminophen-caffeine -40 mg (FIORICET, ESGIC) -40 mg per tablet take 1 tablet by mouth every 4 hours if needed for headache  0     No current facility-administered medications on file prior to visit.           Review of patient's allergies indicates:   Allergen Reactions    Ciprofloxacin Hives    Sulfa (sulfonamide antibiotics) Other (See Comments)     Cp                ROS:   General ROS: negative for - chills, fever or night sweats  Respiratory ROS: no cough, shortness of breath, or wheezing  Cardiovascular ROS: no chest pain or dyspnea on exertion  Musculoskeletal ROS: negative for - muscle pain and muscular weakness  Neurological ROS: positive for - numbness/tingling  Dermatological ROS: negative for pruritus and rash      EXAM:     Vitals:    03/25/22 1224   BP: 112/72   Pulse: 84   Resp: 18   Weight: 59 kg (130 lb 1.1 oz)   Height: 5' 1" (1.549 m)        General:  Alert and Oriented x 3;  No acute distress      Bilateral  Lower extremity exam:    Vascular:    Dorsalis Pedis:  present   Posterior Tibial:  present   Capillary refill time:  3 seconds   Temperature of toes cool to touch   Edema:  none       Neurological:      Sharp touch:  normal   Light touch: normal   Tinels Sign:  Absent   Mulders Click:   Absent        Dermatological:    Skin: thin and atrophic   Wounds/Ulcers:  Absent   Bruising:  Absent   Erythema:  Absent      Musculoskeletal:    Metatarsophalangeal range of motion:   full range of motion   Subtalar joint range of motion: full range of motion   Ankle joint range of motion:  full range of motion   Bunions:  Absent   Hammertoes: Absent          "

## 2022-04-04 ENCOUNTER — PATIENT MESSAGE (OUTPATIENT)
Dept: INTERNAL MEDICINE | Facility: CLINIC | Age: 62
End: 2022-04-04
Payer: COMMERCIAL

## 2022-04-04 RX ORDER — LORAZEPAM 0.5 MG/1
TABLET ORAL
Qty: 30 TABLET | Refills: 0 | Status: SHIPPED | OUTPATIENT
Start: 2022-04-04 | End: 2022-10-16 | Stop reason: SDUPTHER

## 2022-04-04 NOTE — TELEPHONE ENCOUNTER
Notify pt refill sent x 1 as requested of her ativan   But she is in need of a  appt with me for any further refils   Assist in scheduling

## 2022-04-11 ENCOUNTER — PATIENT MESSAGE (OUTPATIENT)
Dept: INTERNAL MEDICINE | Facility: CLINIC | Age: 62
End: 2022-04-11
Payer: COMMERCIAL

## 2022-06-16 ENCOUNTER — TELEPHONE (OUTPATIENT)
Dept: OBSTETRICS AND GYNECOLOGY | Facility: CLINIC | Age: 62
End: 2022-06-16
Payer: COMMERCIAL

## 2022-06-16 ENCOUNTER — OFFICE VISIT (OUTPATIENT)
Dept: OBSTETRICS AND GYNECOLOGY | Facility: CLINIC | Age: 62
End: 2022-06-16
Payer: COMMERCIAL

## 2022-06-16 VITALS
HEIGHT: 61 IN | SYSTOLIC BLOOD PRESSURE: 126 MMHG | WEIGHT: 126.75 LBS | DIASTOLIC BLOOD PRESSURE: 79 MMHG | BODY MASS INDEX: 23.93 KG/M2

## 2022-06-16 DIAGNOSIS — Z01.419 WELL WOMAN EXAM WITH ROUTINE GYNECOLOGICAL EXAM: ICD-10-CM

## 2022-06-16 DIAGNOSIS — N60.99 DUCTAL HYPERPLASIA OF BREAST: Primary | ICD-10-CM

## 2022-06-16 DIAGNOSIS — Z91.89 AT HIGH RISK FOR BREAST CANCER: ICD-10-CM

## 2022-06-16 DIAGNOSIS — Z12.31 BREAST CANCER SCREENING BY MAMMOGRAM: ICD-10-CM

## 2022-06-16 PROCEDURE — 3074F SYST BP LT 130 MM HG: CPT | Mod: CPTII,S$GLB,, | Performed by: STUDENT IN AN ORGANIZED HEALTH CARE EDUCATION/TRAINING PROGRAM

## 2022-06-16 PROCEDURE — 1159F MED LIST DOCD IN RCRD: CPT | Mod: CPTII,S$GLB,, | Performed by: STUDENT IN AN ORGANIZED HEALTH CARE EDUCATION/TRAINING PROGRAM

## 2022-06-16 PROCEDURE — 3078F DIAST BP <80 MM HG: CPT | Mod: CPTII,S$GLB,, | Performed by: STUDENT IN AN ORGANIZED HEALTH CARE EDUCATION/TRAINING PROGRAM

## 2022-06-16 PROCEDURE — 3074F PR MOST RECENT SYSTOLIC BLOOD PRESSURE < 130 MM HG: ICD-10-PCS | Mod: CPTII,S$GLB,, | Performed by: STUDENT IN AN ORGANIZED HEALTH CARE EDUCATION/TRAINING PROGRAM

## 2022-06-16 PROCEDURE — 88175 CYTOPATH C/V AUTO FLUID REDO: CPT | Performed by: STUDENT IN AN ORGANIZED HEALTH CARE EDUCATION/TRAINING PROGRAM

## 2022-06-16 PROCEDURE — 3008F PR BODY MASS INDEX (BMI) DOCUMENTED: ICD-10-PCS | Mod: CPTII,S$GLB,, | Performed by: STUDENT IN AN ORGANIZED HEALTH CARE EDUCATION/TRAINING PROGRAM

## 2022-06-16 PROCEDURE — 1159F PR MEDICATION LIST DOCUMENTED IN MEDICAL RECORD: ICD-10-PCS | Mod: CPTII,S$GLB,, | Performed by: STUDENT IN AN ORGANIZED HEALTH CARE EDUCATION/TRAINING PROGRAM

## 2022-06-16 PROCEDURE — 3078F PR MOST RECENT DIASTOLIC BLOOD PRESSURE < 80 MM HG: ICD-10-PCS | Mod: CPTII,S$GLB,, | Performed by: STUDENT IN AN ORGANIZED HEALTH CARE EDUCATION/TRAINING PROGRAM

## 2022-06-16 PROCEDURE — 99396 PREV VISIT EST AGE 40-64: CPT | Mod: S$GLB,,, | Performed by: STUDENT IN AN ORGANIZED HEALTH CARE EDUCATION/TRAINING PROGRAM

## 2022-06-16 PROCEDURE — 99396 PR PREVENTIVE VISIT,EST,40-64: ICD-10-PCS | Mod: S$GLB,,, | Performed by: STUDENT IN AN ORGANIZED HEALTH CARE EDUCATION/TRAINING PROGRAM

## 2022-06-16 PROCEDURE — 3008F BODY MASS INDEX DOCD: CPT | Mod: CPTII,S$GLB,, | Performed by: STUDENT IN AN ORGANIZED HEALTH CARE EDUCATION/TRAINING PROGRAM

## 2022-06-16 RX ORDER — ALPRAZOLAM 0.25 MG/1
TABLET ORAL
COMMUNITY
Start: 2022-04-20 | End: 2022-10-17 | Stop reason: ALTCHOICE

## 2022-06-16 NOTE — PROGRESS NOTES
History & Physical  Gynecology      SUBJECTIVE:     Chief Complaint: Well Woman       History of Present Illness:  Carmen presents for annual exam  Menstrual History: menopausal since age 44. Not on HRT. No bleeding  Obstetric Hx: 1 CS  Sexually Active: one male partner;  with pancreatic  Family history: below  Social: Wears seatbelts. Does not exercise. Feels safe at home.  Last pap:  normal  Last mammo: Mammogram utd; MRI every 6 month for history of ductal hyperplasia. She is interested in the genetic cancer trial at [facility]  Colonoscopy: utd  Covid vaccine utd  Vitamins: taking      Review of patient's allergies indicates:   Allergen Reactions    Ciprofloxacin Hives    Sulfa (sulfonamide antibiotics) Other (See Comments)     Cp        Past Medical History:   Diagnosis Date    Abnormal Pap smear     Allergy     Endometriosis, moderate     GERD (gastroesophageal reflux disease)     Heart palpitations      Past Surgical History:   Procedure Laterality Date    BILE DUCT EXPLORATION      BREAST BIOPSY Left     [place]     SECTION, CLASSIC      CHOLECYSTECTOMY      ENDOMETRIAL ABLATION      hx benign breast biopsy      leep      TONSILLECTOMY      TUBAL LIGATION       OB History        1    Para   1    Term   1            AB        Living   1       SAB        IAB        Ectopic        Multiple        Live Births   1               Family History   Problem Relation Age of Onset    Diabetes Mother     Cancer Brother         gallbladder ca    Alcohol abuse Father     Breast cancer Paternal Grandmother     Cancer Paternal Grandmother         breast    Cervical cancer Maternal Grandmother     Cancer Maternal Grandmother         cervical    Ovarian cancer Maternal Grandmother     Heart disease Maternal Uncle     Kidney disease Sister         nephrotic syndrome    No Known Problems Son     No Known Problems Sister     Colon cancer Neg Hx      Social History      Tobacco Use    Smoking status: Former Smoker     Packs/day: 0.50     Quit date: 2021     Years since quittin.1    Smokeless tobacco: Never Used    Tobacco comment: smoker since age 14, used to smoke 1 ppd   Substance Use Topics    Alcohol use: Yes     Alcohol/week: 4.0 standard drinks     Types: 4 Cans of beer per week    Drug use: No       Current Outpatient Medications   Medication Sig    collagen, bovine, 100 % Powd     ergocalciferol (ERGOCALCIFEROL) 50,000 unit Cap Take 1 capsule (50,000 Units total) by mouth every 7 days.    EScitalopram oxalate (LEXAPRO) 10 MG tablet Take 1 tablet (10 mg total) by mouth once daily.    LORazepam (ATIVAN) 0.5 MG tablet TAKE 1 TABLET(0.5 MG) BY MOUTH EVERY NIGHT AS NEEDED FOR ANXIETY    ALPRAZolam (XANAX) 0.25 MG tablet     diclofenac sodium (VOLTAREN) 1 % Gel Apply 2 g topically 4 (four) times daily as needed. To painful area on the feet.    gabapentin (NEURONTIN) 100 MG capsule Take 1 capsule (100 mg total) by mouth every evening.     No current facility-administered medications for this visit.         Review of Systems:  Review of Systems   Constitutional: Negative for activity change, appetite change, fever and unexpected weight change.   Respiratory: Negative for cough and shortness of breath.    Cardiovascular: Negative for chest pain.   Gastrointestinal: Negative for abdominal pain, blood in stool, constipation, diarrhea, nausea and vomiting.   Endocrine: Negative for hot flashes.   Genitourinary: Negative for dysuria, frequency, hematuria, hot flashes, pelvic pain, urgency, vaginal bleeding, vaginal discharge, vaginal pain, urinary incontinence, postmenopausal bleeding and vaginal dryness.   Integumentary:  Negative for breast mass.   Breast: Negative for lump and mass       OBJECTIVE:     Physical Exam:  Physical Exam  Vitals reviewed.   Constitutional:       General: She is not in acute distress.     Appearance: She is well-developed. She is  not diaphoretic.   HENT:      Head: Normocephalic and atraumatic.   Eyes:      General: No scleral icterus.        Right eye: No discharge.         Left eye: No discharge.      Conjunctiva/sclera: Conjunctivae normal.   Neck:      Thyroid: No thyromegaly.   Cardiovascular:      Rate and Rhythm: Normal rate.   Pulmonary:      Effort: Pulmonary effort is normal.   Chest:   Breasts: Breasts are symmetrical.      Right: No inverted nipple, mass, nipple discharge, skin change or tenderness.      Left: No inverted nipple, mass, nipple discharge, skin change or tenderness.       Abdominal:      General: There is no distension.      Palpations: Abdomen is soft.      Tenderness: There is no abdominal tenderness.   Genitourinary:     Labia:         Right: No rash, tenderness, lesion or injury.         Left: No rash, tenderness, lesion or injury.       Vagina: Normal. No signs of injury and foreign body. No vaginal discharge, erythema, tenderness or bleeding.      Cervix: No cervical motion tenderness, discharge or friability.      Uterus: Not deviated, not enlarged, not fixed and not tender.       Adnexa:         Right: No mass, tenderness or fullness.          Left: No mass, tenderness or fullness.     Musculoskeletal:         General: Normal range of motion.      Cervical back: Normal range of motion and neck supple.   Lymphadenopathy:      Cervical: No cervical adenopathy.   Skin:     General: Skin is warm and dry.      Findings: No erythema or rash.   Neurological:      Mental Status: She is alert and oriented to person, place, and time.           ASSESSMENT:       ICD-10-CM ICD-9-CM    1. Ductal hyperplasia of breast  N60.99 610.8 Mammo Digital Screening Bilat w/ [facility]      MRI Breast w/wo Contrast, w/CAD, Bilateral   2. Well woman exam with routine gynecological exam  Z01.419 V72.31 Liquid-Based Pap Smear, Screening   3. At high risk for breast cancer  Z91.89 V49.89 Mammo Digital Screening Bilat w/ [facility]      MRI Breast  w/wo Contrast, w/CAD, Bilateral   4. Breast cancer screening by mammogram  Z12.31 V76.12 Mammo Digital Screening Bilat w/ [facility]      MRI Breast w/wo Contrast, w/CAD, Bilateral          Plan:      WWE  - Postmenopausal. No issues.  - Vaccines utd  - Labs utd  - Mammogram and MRI ordered  - Colonoscopy utd  - Pap collected  - Vitamin D and Calcium recs given  - CBE normal. Physical exam normal. VSS  - Will touch base with research and genetics      Counseling time: 15 minutes    Estrellita Alba

## 2022-06-16 NOTE — PATIENT INSTRUCTIONS
Persons nine to 18 years of age: 1,300 mg of calcium, 600 IU of vitamin D  Persons 19 to 50 years of age: 1,000 mg of calcium, 600 IU of vitamin D  Persons 51 to 70 years of age: 1,200 mg of calcium, 600 IU of vitamin D  Persons 71 years and older: 1,200 mg of calcium, 800 IU of vitamin D

## 2022-06-16 NOTE — TELEPHONE ENCOUNTER
----- Message from Estrellita Alba MD sent at 6/16/2022  3:58 PM CDT -----  Please schedule for MRI as soon as she can get in followed by Mammo in 6 months from then

## 2022-06-22 ENCOUNTER — PATIENT MESSAGE (OUTPATIENT)
Dept: OBSTETRICS AND GYNECOLOGY | Facility: CLINIC | Age: 62
End: 2022-06-22
Payer: COMMERCIAL

## 2022-06-23 ENCOUNTER — PATIENT MESSAGE (OUTPATIENT)
Dept: OBSTETRICS AND GYNECOLOGY | Facility: CLINIC | Age: 62
End: 2022-06-23
Payer: COMMERCIAL

## 2022-06-23 ENCOUNTER — TELEPHONE (OUTPATIENT)
Dept: DERMATOLOGY | Facility: CLINIC | Age: 62
End: 2022-06-23
Payer: COMMERCIAL

## 2022-06-23 DIAGNOSIS — Z12.83 SKIN CANCER SCREENING: Primary | ICD-10-CM

## 2022-06-23 NOTE — TELEPHONE ENCOUNTER
Pt would wait until November [facility] scheduled open up in July

## 2022-06-23 NOTE — TELEPHONE ENCOUNTER
----- Message from Tequila Jha sent at 6/23/2022  9:17 AM CDT -----  Contact: Patient  Patient requesting call back in regards to scheduling a NP appointment.       [phone]

## 2022-08-22 ENCOUNTER — TELEPHONE (OUTPATIENT)
Dept: NEUROLOGY | Facility: CLINIC | Age: 62
End: 2022-08-22

## 2022-08-22 NOTE — TELEPHONE ENCOUNTER
Left message on pt's voicemail in regards to rescheduling appt 09/21/2022 with Dr. Howe.    My chart message also sent...

## 2022-09-08 ENCOUNTER — PATIENT MESSAGE (OUTPATIENT)
Dept: INTERNAL MEDICINE | Facility: CLINIC | Age: 62
End: 2022-09-08
Payer: COMMERCIAL

## 2022-09-08 DIAGNOSIS — U07.1 COVID-19: Primary | ICD-10-CM

## 2022-09-09 NOTE — TELEPHONE ENCOUNTER
Spoke with pt   And reviewed   She started paxlovid and has had some improvement  Still headaches congestion  Conservative meaures and nasal sprays  Complet paxlovid as planned

## 2022-11-08 RX ORDER — ESCITALOPRAM OXALATE 10 MG/1
10 TABLET ORAL DAILY
Qty: 30 TABLET | Refills: 6 | Status: SHIPPED | OUTPATIENT
Start: 2022-11-08 | End: 2023-06-05 | Stop reason: ALTCHOICE

## 2022-11-08 NOTE — TELEPHONE ENCOUNTER
No new care gaps identified.  [facility] [place] Embedded Care Gaps. Reference number: [id number]. 11/08/2022   10:44:59 AM CST

## 2022-12-08 ENCOUNTER — HOSPITAL ENCOUNTER (OUTPATIENT)
Dept: RADIOLOGY | Facility: HOSPITAL | Age: 62
Discharge: HOME | End: 2022-12-08
Attending: STUDENT IN AN ORGANIZED HEALTH CARE EDUCATION/TRAINING PROGRAM
Payer: COMMERCIAL

## 2022-12-08 DIAGNOSIS — Z91.89 AT HIGH RISK FOR BREAST CANCER: ICD-10-CM

## 2022-12-08 DIAGNOSIS — Z12.31 BREAST CANCER SCREENING BY MAMMOGRAM: ICD-10-CM

## 2022-12-08 DIAGNOSIS — N60.99 DUCTAL HYPERPLASIA OF BREAST: ICD-10-CM

## 2022-12-19 ENCOUNTER — NURSE TRIAGE (OUTPATIENT)
Dept: ADMINISTRATIVE | Facility: CLINIC | Age: 62
End: 2022-12-19
Payer: COMMERCIAL

## 2022-12-19 NOTE — TELEPHONE ENCOUNTER
Calling to reschedule a doctor's appt. Was having SOB and palpitations and that comes and goes with activity that has been present for a while. Pt was called by MD office to cancel her appt this morning because the practitioner was not going to be in today and caller had to reschedule the appt. . Appt is for an evaluation. Caller denies any problems at this time. Denies SOB or palpitations at this time. Just want to reschedule the appt that was canceled by the office staff as she was leaving her house.   Reason for Disposition   Requesting regular office appointment    Protocols used: Information Only Call - No Triage-A-

## 2022-12-21 ENCOUNTER — OFFICE VISIT (OUTPATIENT)
Dept: INTERNAL MEDICINE | Facility: CLINIC | Age: 62
End: 2022-12-21
Payer: COMMERCIAL

## 2022-12-21 ENCOUNTER — TELEPHONE (OUTPATIENT)
Dept: INTERNAL MEDICINE | Facility: CLINIC | Age: 62
End: 2022-12-21

## 2022-12-21 ENCOUNTER — IMMUNIZATION (OUTPATIENT)
Dept: INTERNAL MEDICINE | Facility: CLINIC | Age: 62
End: 2022-12-21
Payer: COMMERCIAL

## 2022-12-21 ENCOUNTER — HOSPITAL ENCOUNTER (OUTPATIENT)
Dept: RADIOLOGY | Facility: HOSPITAL | Age: 62
Discharge: HOME | End: 2022-12-21
Attending: INTERNAL MEDICINE
Payer: COMMERCIAL

## 2022-12-21 ENCOUNTER — HOSPITAL ENCOUNTER (OUTPATIENT)
Dept: CARDIOLOGY | Facility: CLINIC | Age: 62
Discharge: HOME | End: 2022-12-21
Payer: COMMERCIAL

## 2022-12-21 VITALS
HEART RATE: 79 BPM | DIASTOLIC BLOOD PRESSURE: 78 MMHG | BODY MASS INDEX: 24.35 KG/M2 | SYSTOLIC BLOOD PRESSURE: 128 MMHG | OXYGEN SATURATION: 98 % | HEIGHT: 61 IN | WEIGHT: 129 LBS

## 2022-12-21 DIAGNOSIS — R06.00 DYSPNEA, UNSPECIFIED TYPE: ICD-10-CM

## 2022-12-21 DIAGNOSIS — E78.5 HYPERLIPIDEMIA, UNSPECIFIED HYPERLIPIDEMIA TYPE: Primary | ICD-10-CM

## 2022-12-21 DIAGNOSIS — E55.9 VITAMIN D DEFICIENCY: ICD-10-CM

## 2022-12-21 DIAGNOSIS — R41.3 MEMORY LOSS: ICD-10-CM

## 2022-12-21 DIAGNOSIS — H69.90 DYSFUNCTION OF EUSTACHIAN TUBE, UNSPECIFIED LATERALITY: ICD-10-CM

## 2022-12-21 DIAGNOSIS — R00.2 PALPITATIONS: Primary | ICD-10-CM

## 2022-12-21 DIAGNOSIS — F41.9 ANXIETY: ICD-10-CM

## 2022-12-21 DIAGNOSIS — R00.2 PALPITATIONS: ICD-10-CM

## 2022-12-21 DIAGNOSIS — E53.8 B12 DEFICIENCY: ICD-10-CM

## 2022-12-21 DIAGNOSIS — Z23 ENCOUNTER FOR ADMINISTRATION OF VACCINE: Primary | ICD-10-CM

## 2022-12-21 DIAGNOSIS — Z72.0 TOBACCO USE: ICD-10-CM

## 2022-12-21 PROCEDURE — 3074F SYST BP LT 130 MM HG: CPT | Mod: CPTII,S$GLB,, | Performed by: INTERNAL MEDICINE

## 2022-12-21 PROCEDURE — 3008F BODY MASS INDEX DOCD: CPT | Mod: CPTII,S$GLB,, | Performed by: INTERNAL MEDICINE

## 2022-12-21 PROCEDURE — 3078F DIAST BP <80 MM HG: CPT | Mod: CPTII,S$GLB,, | Performed by: INTERNAL MEDICINE

## 2022-12-21 PROCEDURE — 90686 IIV4 VACC NO PRSV 0.5 ML IM: CPT | Mod: S$GLB,,, | Performed by: INTERNAL MEDICINE

## 2022-12-21 PROCEDURE — 93010 EKG 12-LEAD: ICD-10-PCS | Mod: S$GLB,,, | Performed by: INTERNAL MEDICINE

## 2022-12-21 PROCEDURE — 3074F PR MOST RECENT SYSTOLIC BLOOD PRESSURE < 130 MM HG: ICD-10-PCS | Mod: CPTII,S$GLB,, | Performed by: INTERNAL MEDICINE

## 2022-12-21 PROCEDURE — 90471 IMMUNIZATION ADMIN: CPT | Mod: S$GLB,,, | Performed by: INTERNAL MEDICINE

## 2022-12-21 PROCEDURE — 1159F PR MEDICATION LIST DOCUMENTED IN MEDICAL RECORD: ICD-10-PCS | Mod: CPTII,S$GLB,, | Performed by: INTERNAL MEDICINE

## 2022-12-21 PROCEDURE — 3078F PR MOST RECENT DIASTOLIC BLOOD PRESSURE < 80 MM HG: ICD-10-PCS | Mod: CPTII,S$GLB,, | Performed by: INTERNAL MEDICINE

## 2022-12-21 PROCEDURE — 99214 OFFICE O/P EST MOD 30 MIN: CPT | Mod: 25,S$GLB,, | Performed by: INTERNAL MEDICINE

## 2022-12-21 PROCEDURE — 90686 FLU VACCINE (QUAD) GREATER THAN OR EQUAL TO 3YO PRESERVATIVE FREE IM: ICD-10-PCS | Mod: S$GLB,,, | Performed by: INTERNAL MEDICINE

## 2022-12-21 PROCEDURE — 99214 PR OFFICE/OUTPT VISIT, EST, LEVL IV, 30-39 MIN: ICD-10-PCS | Mod: 25,S$GLB,, | Performed by: INTERNAL MEDICINE

## 2022-12-21 PROCEDURE — 1160F RVW MEDS BY RX/DR IN RCRD: CPT | Mod: CPTII,S$GLB,, | Performed by: INTERNAL MEDICINE

## 2022-12-21 PROCEDURE — 1159F MED LIST DOCD IN RCRD: CPT | Mod: CPTII,S$GLB,, | Performed by: INTERNAL MEDICINE

## 2022-12-21 PROCEDURE — 3008F PR BODY MASS INDEX (BMI) DOCUMENTED: ICD-10-PCS | Mod: CPTII,S$GLB,, | Performed by: INTERNAL MEDICINE

## 2022-12-21 PROCEDURE — 99999 PR PBB SHADOW E&M-EST. PATIENT-LVL IV: ICD-10-PCS | Mod: PBBFAC,,, | Performed by: INTERNAL MEDICINE

## 2022-12-21 PROCEDURE — 99999 PR PBB SHADOW E&M-EST. PATIENT-LVL IV: CPT | Mod: PBBFAC,,, | Performed by: INTERNAL MEDICINE

## 2022-12-21 PROCEDURE — 90471 FLU VACCINE (QUAD) GREATER THAN OR EQUAL TO 3YO PRESERVATIVE FREE IM: ICD-10-PCS | Mod: S$GLB,,, | Performed by: INTERNAL MEDICINE

## 2022-12-21 PROCEDURE — 93005 EKG 12-LEAD: ICD-10-PCS | Mod: S$GLB,,, | Performed by: INTERNAL MEDICINE

## 2022-12-21 PROCEDURE — 93010 ELECTROCARDIOGRAM REPORT: CPT | Mod: S$GLB,,, | Performed by: INTERNAL MEDICINE

## 2022-12-21 PROCEDURE — 1160F PR REVIEW ALL MEDS BY PRESCRIBER/CLIN PHARMACIST DOCUMENTED: ICD-10-PCS | Mod: CPTII,S$GLB,, | Performed by: INTERNAL MEDICINE

## 2022-12-21 PROCEDURE — 71046 XR CHEST PA AND LATERAL: ICD-10-PCS | Mod: 26,,, | Performed by: RADIOLOGY

## 2022-12-21 PROCEDURE — 71046 X-RAY EXAM CHEST 2 VIEWS: CPT | Mod: TC

## 2022-12-21 PROCEDURE — 71046 X-RAY EXAM CHEST 2 VIEWS: CPT | Mod: 26,,, | Performed by: RADIOLOGY

## 2022-12-21 PROCEDURE — 93005 ELECTROCARDIOGRAM TRACING: CPT | Mod: S$GLB,,, | Performed by: INTERNAL MEDICINE

## 2022-12-21 RX ORDER — ACETAMINOPHEN, DEXTROMETHORPHAN HBR, DOXYLAMINE SUCCINATE, PHENYLEPHRINE HCL 650; 20; 12.5; 1 MG/30ML; MG/30ML; MG/30ML; MG/30ML
1 SOLUTION ORAL DAILY
Qty: 90 EACH | Refills: 0
Start: 2022-12-21

## 2022-12-21 RX ORDER — ERGOCALCIFEROL 1.25 MG/1
50000 CAPSULE ORAL
Qty: 12 CAPSULE | Refills: 4 | Status: SHIPPED | OUTPATIENT
Start: 2022-12-21

## 2022-12-21 NOTE — PROGRESS NOTES
After obtaining consent, and per orders of Dr. Edwige Addison, injection of Fluarix given in left deltoid by Frannie Bobo. Patient instructed to remain in clinic for 15 minutes afterwards, and to report any adverse reaction to staff immediately. Pt tolerated vaccine well.

## 2022-12-21 NOTE — PROGRESS NOTES
"Subjective:       Patient ID: Carmen Manriquez is a 62 y.o. female.    Chief Complaint: Shortness of Breath and Palpitations  This is a 62-year-old who presents today for follow-up she is not been in for bit reports that his home working virtually under some increased situational stress with her 's cancer although she reports he is doing well in remission currently they continue to have scans regularly which is stressful.  She is not been doing much regular exercise reports that she has noticed some increasing symptoms of concern having more palpitations reports it often happens at night she does have an Apple watch but reports isn't able to check her watch when she is having the symptoms although she feels her heart rate is in the 90s often she denies fever or productive cough but does continue to smoke.  She gets tightness or dyspnea on occasion when she exerts herself but reports that she has been rather sedentary over the last 2 years since COVID.  Sometimes she feels that her memory isn't as good she forgets what she is doing quickly.  She has been taking her Lexapro but cuts the 10 mg tablet in half she has Ativan for use only when she has not slept in a while then uses it rarely.    Shortness of Breath  Associated symptoms include chest pain. Pertinent negatives include no fever or leg swelling.   Review of Systems   Constitutional:  Negative for fever.   Respiratory:  Positive for shortness of breath.    Cardiovascular:  Positive for chest pain and palpitations. Negative for leg swelling.   Neurological:         Forgetful at times    Psychiatric/Behavioral:  The patient is nervous/anxious.         Siturational stress  has pancreatic cancer       Objective:    Blood pressure 128/78, pulse 79, height 5' 1" (1.549 m), weight 58.5 kg (128 lb 15.5 oz), SpO2 98 %.   Physical Exam  Constitutional:       General: She is not in acute distress.  HENT:      Head: Normocephalic.      Comments: Tube right ear "      Mouth/Throat:      Pharynx: Oropharynx is clear.   Eyes:      General: No scleral icterus.  Cardiovascular:      Rate and Rhythm: Normal rate and regular rhythm.      Heart sounds: Normal heart sounds. No murmur heard.    No friction rub. No gallop.   Pulmonary:      Effort: Pulmonary effort is normal. No respiratory distress.      Breath sounds: Normal breath sounds.   Abdominal:      General: Bowel sounds are normal.      Palpations: Abdomen is soft. There is no mass.      Tenderness: There is no abdominal tenderness.   Musculoskeletal:      Cervical back: Neck supple.   Skin:     Findings: No erythema.   Neurological:      Mental Status: She is alert.   Psychiatric:         Mood and Affect: Mood normal.       Assessment:       1. Palpitations    2. Dyspnea, unspecified type    3. Anxiety    4. Tobacco use    5. Vitamin D deficiency    6. Memory loss    7. Dysfunction of Eustachian tube, unspecified laterality          Plan:       Carmen was seen today for shortness of breath and palpitations.    Diagnoses and all orders for this visit:    Palpitations  -     CBC Auto Differential; Future  -     Comprehensive Metabolic Panel; Future  -     Lipid Panel; Future  -     TSH; Future  -     Hemoglobin A1C; Future  -     EKG 12-lead; Future  -     Ambulatory referral/consult to Cardiology; Future  -     X-Ray Chest PA And Lateral; Future    Dyspnea, unspecified type  -     EKG 12-lead; Future  -     Ambulatory referral/consult to Cardiology; Future  -     X-Ray Chest PA And Lateral; Future    Anxiety  Situational stress with 's illness will maintain Lexapro but increase to 10 mg as tolerated she has Ativan for rare use if needed    Tobacco use  Smoking cessation recommended    Vitamin D deficiency  Will check but continue vitamin-D  -     Vitamin D; Future    Memory loss  With some concerns discussed neurology follow-up if symptoms persist will check  -     Vitamin B12; Future  -     FOLATE;  Future    Dysfunction of Eustachian tube, unspecified laterality  Discussed with patient recommend she schedule her follow-up with her ENT    Follow up 2 months  She will seek attention with severe chest pain increased concern prior to her evaluation if needed

## 2022-12-21 NOTE — TELEPHONE ENCOUNTER
Pt called  Labs and ekg/chest xray reviewed  Discusse lipids and starting medication  She would like to work on her diet first  B12 low start oral b12 1000 mcg daily  And continue her vit d  Recheck labs at follow up   Cardiology as manan

## 2022-12-21 NOTE — TELEPHONE ENCOUNTER
----- Message from Edwige Addison MD sent at 12/21/2022  3:53 PM CST -----  Called reviewed with pt\  Discussed cholesterol and recommend consider medication she delcines would like  To work and recheck will also disucss with cardiology  B12 low may be contributing to fatigue advise start oral b12 1000 mcg daily    Recheck labs a day prior to her follow up please schedule b12/lipid a few days prior to her appt with me

## 2023-01-04 ENCOUNTER — OFFICE VISIT (OUTPATIENT)
Dept: CARDIOLOGY | Facility: CLINIC | Age: 63
End: 2023-01-04
Payer: COMMERCIAL

## 2023-01-04 VITALS
HEART RATE: 88 BPM | HEIGHT: 61 IN | WEIGHT: 127.63 LBS | BODY MASS INDEX: 24.1 KG/M2 | OXYGEN SATURATION: 98 % | DIASTOLIC BLOOD PRESSURE: 70 MMHG | SYSTOLIC BLOOD PRESSURE: 130 MMHG

## 2023-01-04 DIAGNOSIS — R06.00 DYSPNEA, UNSPECIFIED TYPE: ICD-10-CM

## 2023-01-04 DIAGNOSIS — R00.2 PALPITATIONS: ICD-10-CM

## 2023-01-04 DIAGNOSIS — I73.00 RAYNAUD'S PHENOMENON WITHOUT GANGRENE: Chronic | ICD-10-CM

## 2023-01-04 DIAGNOSIS — E78.5 HYPERLIPIDEMIA, UNSPECIFIED HYPERLIPIDEMIA TYPE: Primary | ICD-10-CM

## 2023-01-04 PROCEDURE — 3078F PR MOST RECENT DIASTOLIC BLOOD PRESSURE < 80 MM HG: ICD-10-PCS | Mod: CPTII,S$GLB,, | Performed by: INTERNAL MEDICINE

## 2023-01-04 PROCEDURE — 3078F DIAST BP <80 MM HG: CPT | Mod: CPTII,S$GLB,, | Performed by: INTERNAL MEDICINE

## 2023-01-04 PROCEDURE — 3008F PR BODY MASS INDEX (BMI) DOCUMENTED: ICD-10-PCS | Mod: CPTII,S$GLB,, | Performed by: INTERNAL MEDICINE

## 2023-01-04 PROCEDURE — 3075F SYST BP GE 130 - 139MM HG: CPT | Mod: CPTII,S$GLB,, | Performed by: INTERNAL MEDICINE

## 2023-01-04 PROCEDURE — 1160F PR REVIEW ALL MEDS BY PRESCRIBER/CLIN PHARMACIST DOCUMENTED: ICD-10-PCS | Mod: CPTII,S$GLB,, | Performed by: INTERNAL MEDICINE

## 2023-01-04 PROCEDURE — 1160F RVW MEDS BY RX/DR IN RCRD: CPT | Mod: CPTII,S$GLB,, | Performed by: INTERNAL MEDICINE

## 2023-01-04 PROCEDURE — 1159F MED LIST DOCD IN RCRD: CPT | Mod: CPTII,S$GLB,, | Performed by: INTERNAL MEDICINE

## 2023-01-04 PROCEDURE — 99204 OFFICE O/P NEW MOD 45 MIN: CPT | Mod: S$GLB,,, | Performed by: INTERNAL MEDICINE

## 2023-01-04 PROCEDURE — 3075F PR MOST RECENT SYSTOLIC BLOOD PRESS GE 130-139MM HG: ICD-10-PCS | Mod: CPTII,S$GLB,, | Performed by: INTERNAL MEDICINE

## 2023-01-04 PROCEDURE — 99999 PR PBB SHADOW E&M-EST. PATIENT-LVL IV: ICD-10-PCS | Mod: PBBFAC,,, | Performed by: INTERNAL MEDICINE

## 2023-01-04 PROCEDURE — 99204 PR OFFICE/OUTPT VISIT, NEW, LEVL IV, 45-59 MIN: ICD-10-PCS | Mod: S$GLB,,, | Performed by: INTERNAL MEDICINE

## 2023-01-04 PROCEDURE — 1159F PR MEDICATION LIST DOCUMENTED IN MEDICAL RECORD: ICD-10-PCS | Mod: CPTII,S$GLB,, | Performed by: INTERNAL MEDICINE

## 2023-01-04 PROCEDURE — 3008F BODY MASS INDEX DOCD: CPT | Mod: CPTII,S$GLB,, | Performed by: INTERNAL MEDICINE

## 2023-01-04 PROCEDURE — 99999 PR PBB SHADOW E&M-EST. PATIENT-LVL IV: CPT | Mod: PBBFAC,,, | Performed by: INTERNAL MEDICINE

## 2023-01-04 NOTE — PROGRESS NOTES
"  Chart has been dictated using voice recognition software.  It is not been reviewed carefully for any transcriptional errors due to this technology.   Subjective:   Patient ID:  Carmen Manriquez is a 62 y.o. female who presents for evaluation of Follow-up, Palpitations (sob), and Shortness of Breath      HPI: Patient with no significant cardiac risk factor.  Has been having feelings of skipped beats that "takes my breath away" and also has a feeling of her heart "vibrating" in her chest in her chest when lying down at night. Has been going on for years.  Now wants to know if she can exercise because she has not exercised since Covid-19. Had Covid-19 in 2022 but not hospitalized. Patient denies any chest discomfort on exertion or at rest. Will get dyspnea walking up and down stairs and with anxiety. No syncope, no edema. No orthopnea or PND. Palpitations occur qd but vibration only when dehydrated.    Cardiac risk factors: hyperlipidemia, positive family history, tobacco use    Past Medical History:   Diagnosis Date    Abnormal Pap smear     Allergy     Endometriosis, moderate     GERD (gastroesophageal reflux disease)     Heart palpitations        Past Surgical History:   Procedure Laterality Date    BILE DUCT EXPLORATION      BREAST BIOPSY Left     [place]     SECTION, CLASSIC      CHOLECYSTECTOMY      ENDOMETRIAL ABLATION      hx benign breast biopsy      leep      TONSILLECTOMY      TUBAL LIGATION         Social History     Tobacco Use    Smoking status: Former     Packs/day: 0.50     Types: Cigarettes     Quit date: 2021     Years since quittin.7    Smokeless tobacco: Never    Tobacco comments:     smoker since age 14, used to smoke 1 ppd   Substance Use Topics    Alcohol use: Yes     Alcohol/week: 4.0 standard drinks     Types: 4 Cans of beer per week    Drug use: No       Outpatient Medications Prior to Visit   Medication Sig Dispense Refill    collagen, bovine, 100 % Powd       " cyanocobalamin, vitamin B-12, 1,000 mcg TbSR Take 1 tablet by mouth once daily. 90 each 0    ergocalciferol (ERGOCALCIFEROL) 50,000 unit Cap Take 1 capsule (50,000 Units total) by mouth every 7 days. 12 capsule 4    EScitalopram oxalate (LEXAPRO) 10 MG tablet Take 1 tablet (10 mg total) by mouth once daily. 30 tablet 6    LORazepam (ATIVAN) 0.5 MG tablet TAKE 1 TABLET(0.5 MG) BY MOUTH EVERY NIGHT AS NEEDED FOR ANXIETY 30 tablet 0     No facility-administered medications prior to visit.       Review of patient's allergies indicates:   Allergen Reactions    Ciprofloxacin Hives    Sulfa (sulfonamide antibiotics) Other (See Comments)     Cp        Review of Systems   Constitutional: Negative for weight gain and weight loss.   HENT:  Negative for nosebleeds.    Eyes:  Negative for vision loss in left eye and vision loss in right eye.   Cardiovascular:  Negative for claudication.        As above   Respiratory:  Negative for hemoptysis, shortness of breath, snoring, sputum production and wheezing.    Endocrine: Negative for polydipsia and polyuria.   Hematologic/Lymphatic: Does not bruise/bleed easily.   Musculoskeletal:  Negative for myalgias.   Gastrointestinal:  Negative for change in bowel habit, hematemesis, hematochezia, melena, nausea and vomiting.   Genitourinary:  Negative for hematuria.   Neurological:  Negative for focal weakness and numbness.    Objective:   Physical Exam    Lab Results   Component Value Date    WBC 6.98 12/21/2022    HGB 14.1 12/21/2022    HCT 43.6 12/21/2022    MCV 92 12/21/2022     12/21/2022       Lab Results   Component Value Date     12/21/2022    K 3.7 12/21/2022    BUN 9 12/21/2022    CREATININE 0.7 12/21/2022    GLU 84 12/21/2022    HGBA1C 5.4 12/21/2022    CHOL 266 (H) 12/21/2022    HDL 56 12/21/2022    LDLCALC 180.2 (H) 12/21/2022    TRIG 149 12/21/2022    CHOLHDL 21.1 12/21/2022    HGB 14.1 12/21/2022    HCT 43.6 12/21/2022     12/21/2022    INR 0.9 04/01/2013      ECG (12-Dec-2022) showed sinus rhythm and was a normal ECG.   Assessment:     1. Palpitations    2. Dyspnea, unspecified type      Patient has palpitations of unclear etiology which may not be cardiac.  Therefore, the patient will undergo 48 hour Holter monitoring and has been instructed on keeping a good diary to see if there is any correlation between her symptoms and any underlying rhythm disturbances.  Additionally, given the patient's dyspnea with walking stairs, an echocardiogram will be obtained to determine whether she has normal left ventricular function.  However, her physical exam suggests that she has normal cardiac function.  Further decisions about the patient's cardiac evaluation and care will be made after reviewing the results of these 2 tests.    Unless there are any abnormalities on her Holter monitor or her echocardiogram, at this time, there is nothing further to add to this patient's care from a cardiovascular point of view. Unless the patient has further cardiovascular problems, there is no need for the patient to return for re-evaluation.  However, I would be happy to see the patient again if needed, or if there are any abnormalities on her Holter or echo exams..      Plan:     Carmen was seen today for follow-up, palpitations and shortness of breath.    Diagnoses and all orders for this visit:    Palpitations  -     Ambulatory referral/consult to Cardiology    Dyspnea, unspecified type  -     Ambulatory referral/consult to Cardiology          Fran Patel MD  Consultative Cardiology

## 2023-01-04 NOTE — Clinical Note
Thank you for referring Carmen Manriquez for evaluation of palpitations and dyspnea. Please see my note for details of this encounter. If you have any questions, please contact me.  Thank you again for the referral.

## 2023-01-11 ENCOUNTER — HOSPITAL ENCOUNTER (OUTPATIENT)
Dept: RADIOLOGY | Facility: HOSPITAL | Age: 63
Discharge: HOME | End: 2023-01-11
Attending: STUDENT IN AN ORGANIZED HEALTH CARE EDUCATION/TRAINING PROGRAM
Payer: COMMERCIAL

## 2023-01-11 DIAGNOSIS — Z91.89 AT HIGH RISK FOR BREAST CANCER: ICD-10-CM

## 2023-01-11 DIAGNOSIS — N60.99 DUCTAL HYPERPLASIA OF BREAST: ICD-10-CM

## 2023-01-11 DIAGNOSIS — Z12.31 BREAST CANCER SCREENING BY MAMMOGRAM: ICD-10-CM

## 2023-01-11 PROCEDURE — 77049 MRI BREAST C-+ W/CAD BI: CPT | Mod: TC

## 2023-01-11 PROCEDURE — A9577 INJ MULTIHANCE: HCPCS | Performed by: STUDENT IN AN ORGANIZED HEALTH CARE EDUCATION/TRAINING PROGRAM

## 2023-01-11 PROCEDURE — 25500020 PHARM REV CODE 255: Performed by: STUDENT IN AN ORGANIZED HEALTH CARE EDUCATION/TRAINING PROGRAM

## 2023-01-11 PROCEDURE — 77049 MRI BREAST C-+ W/CAD BI: CPT | Mod: 26,,, | Performed by: RADIOLOGY

## 2023-01-11 PROCEDURE — 77049 MRI BREAST W/WO CONTRAST, W/CAD, BILATERAL: ICD-10-PCS | Mod: 26,,, | Performed by: RADIOLOGY

## 2023-01-11 RX ADMIN — GADOBENATE DIMEGLUMINE 13 ML: 529 INJECTION, SOLUTION INTRAVENOUS at 06:01

## 2023-01-13 ENCOUNTER — CLINICAL SUPPORT (OUTPATIENT)
Dept: CARDIOLOGY | Facility: HOSPITAL | Age: 63
End: 2023-01-13
Attending: INTERNAL MEDICINE
Payer: COMMERCIAL

## 2023-01-13 ENCOUNTER — HOSPITAL ENCOUNTER (OUTPATIENT)
Dept: CARDIOLOGY | Facility: HOSPITAL | Age: 63
Discharge: HOME | End: 2023-01-13
Attending: INTERNAL MEDICINE
Payer: COMMERCIAL

## 2023-01-13 VITALS
SYSTOLIC BLOOD PRESSURE: 120 MMHG | HEIGHT: 61 IN | DIASTOLIC BLOOD PRESSURE: 80 MMHG | HEART RATE: 64 BPM | BODY MASS INDEX: 23.98 KG/M2 | WEIGHT: 127 LBS

## 2023-01-13 DIAGNOSIS — R06.00 DYSPNEA, UNSPECIFIED TYPE: ICD-10-CM

## 2023-01-13 DIAGNOSIS — R00.2 PALPITATIONS: ICD-10-CM

## 2023-01-13 LAB
ASCENDING AORTA: 2.65 CM
AV INDEX (PROSTH): 0.9
AV MEAN GRADIENT: 3 MMHG
AV PEAK GRADIENT: 5 MMHG
AV VALVE AREA: 2.11 CM2
AV VELOCITY RATIO: 0.81
BSA FOR ECHO PROCEDURE: 1.57 M2
CV ECHO LV RWT: 0.28 CM
DOP CALC AO PEAK VEL: 1.09 M/S
DOP CALC AO VTI: 19.76 CM
DOP CALC LVOT AREA: 2.3 CM2
DOP CALC LVOT DIAMETER: 1.73 CM
DOP CALC LVOT PEAK VEL: 0.88 M/S
DOP CALC LVOT STROKE VOLUME: 41.75 CM3
DOP CALCLVOT PEAK VEL VTI: 17.77 CM
E WAVE DECELERATION TIME: 158.95 MSEC
E/A RATIO: 1.27
E/E' RATIO: 6.82 M/S
ECHO LV POSTERIOR WALL: 0.61 CM (ref 0.6–1.1)
EJECTION FRACTION: 60 %
FRACTIONAL SHORTENING: 27 % (ref 28–44)
INTERVENTRICULAR SEPTUM: 0.46 CM (ref 0.6–1.1)
LA MAJOR: 3.58 CM
LA MINOR: 3.32 CM
LA WIDTH: 3.07 CM
LEFT ATRIUM SIZE: 2.88 CM
LEFT ATRIUM VOLUME INDEX MOD: 16.7 ML/M2
LEFT ATRIUM VOLUME INDEX: 16.6 ML/M2
LEFT ATRIUM VOLUME MOD: 26.01 CM3
LEFT ATRIUM VOLUME: 25.89 CM3
LEFT INTERNAL DIMENSION IN SYSTOLE: 3.16 CM (ref 2.1–4)
LEFT VENTRICLE DIASTOLIC VOLUME INDEX: 53.89 ML/M2
LEFT VENTRICLE DIASTOLIC VOLUME: 84.07 ML
LEFT VENTRICLE MASS INDEX: 41 G/M2
LEFT VENTRICLE SYSTOLIC VOLUME INDEX: 25.4 ML/M2
LEFT VENTRICLE SYSTOLIC VOLUME: 39.59 ML
LEFT VENTRICULAR INTERNAL DIMENSION IN DIASTOLE: 4.32 CM (ref 3.5–6)
LEFT VENTRICULAR MASS: 63.81 G
LV LATERAL E/E' RATIO: 6.82 M/S
LV SEPTAL E/E' RATIO: 6.82 M/S
MV A" WAVE DURATION": 10.28 MSEC
MV PEAK A VEL: 0.59 M/S
MV PEAK E VEL: 0.75 M/S
MV STENOSIS PRESSURE HALF TIME: 46.09 MS
MV VALVE AREA P 1/2 METHOD: 4.77 CM2
PISA TR MAX VEL: 2.35 M/S
PULM VEIN S/D RATIO: 1.28
PV PEAK D VEL: 0.32 M/S
PV PEAK S VEL: 0.41 M/S
RA MAJOR: 4.13 CM
RA PRESSURE: 3 MMHG
RA WIDTH: 3.02 CM
RIGHT VENTRICULAR END-DIASTOLIC DIMENSION: 2.96 CM
RV TISSUE DOPPLER FREE WALL SYSTOLIC VELOCITY 1 (APICAL 4 CHAMBER VIEW): 11.37 CM/S
SINUS: 2.8 CM
STJ: 2.04 CM
TDI LATERAL: 0.11 M/S
TDI SEPTAL: 0.11 M/S
TDI: 0.11 M/S
TR MAX PG: 22 MMHG
TRICUSPID ANNULAR PLANE SYSTOLIC EXCURSION: 1.89 CM
TV REST PULMONARY ARTERY PRESSURE: 25 MMHG

## 2023-01-13 PROCEDURE — 93306 TTE W/DOPPLER COMPLETE: CPT | Mod: 26,,, | Performed by: INTERNAL MEDICINE

## 2023-01-13 PROCEDURE — 93306 TTE W/DOPPLER COMPLETE: CPT

## 2023-01-13 PROCEDURE — 93225 XTRNL ECG REC<48 HRS REC: CPT

## 2023-01-13 PROCEDURE — 93306 ECHO (CUPID ONLY): ICD-10-PCS | Mod: 26,,, | Performed by: INTERNAL MEDICINE

## 2023-01-13 PROCEDURE — 93227 HOLTER MONITOR - 48 HOUR (CUPID ONLY): ICD-10-PCS | Mod: ,,, | Performed by: INTERNAL MEDICINE

## 2023-01-13 PROCEDURE — 93227 XTRNL ECG REC<48 HR R&I: CPT | Mod: ,,, | Performed by: INTERNAL MEDICINE

## 2023-01-17 LAB
OHS CV EVENT MONITOR DAY: 0
OHS CV HOLTER LENGTH DECIMAL HOURS: 48
OHS CV HOLTER LENGTH HOURS: 48
OHS CV HOLTER LENGTH MINUTES: 0
OHS CV HOLTER SINUS AVERAGE HR: 83
OHS CV HOLTER SINUS MAX HR: 130
OHS CV HOLTER SINUS MIN HR: 52

## 2023-01-30 ENCOUNTER — PATIENT MESSAGE (OUTPATIENT)
Dept: CARDIOLOGY | Facility: CLINIC | Age: 63
End: 2023-01-30
Payer: COMMERCIAL

## 2023-02-23 ENCOUNTER — LAB VISIT (OUTPATIENT)
Dept: LAB | Facility: HOSPITAL | Age: 63
End: 2023-02-23
Attending: INTERNAL MEDICINE
Payer: COMMERCIAL

## 2023-02-23 DIAGNOSIS — E53.8 B12 DEFICIENCY: ICD-10-CM

## 2023-02-23 DIAGNOSIS — E78.5 HYPERLIPIDEMIA, UNSPECIFIED HYPERLIPIDEMIA TYPE: ICD-10-CM

## 2023-02-23 LAB
CHOLEST SERPL-MCNC: 251 MG/DL (ref 120–199)
CHOLEST/HDLC SERPL: 4.8 {RATIO} (ref 2–5)
HDLC SERPL-MCNC: 52 MG/DL (ref 40–75)
HDLC SERPL: 20.7 % (ref 20–50)
LDLC SERPL CALC-MCNC: 176.2 MG/DL (ref 63–159)
NONHDLC SERPL-MCNC: 199 MG/DL
TRIGL SERPL-MCNC: 114 MG/DL (ref 30–150)
VIT B12 SERPL-MCNC: 493 PG/ML (ref 210–950)

## 2023-02-23 PROCEDURE — 82607 VITAMIN B-12: CPT | Performed by: INTERNAL MEDICINE

## 2023-02-23 PROCEDURE — 36415 COLL VENOUS BLD VENIPUNCTURE: CPT | Performed by: INTERNAL MEDICINE

## 2023-02-23 PROCEDURE — 80061 LIPID PANEL: CPT | Performed by: INTERNAL MEDICINE

## 2023-04-11 ENCOUNTER — OFFICE VISIT (OUTPATIENT)
Dept: NEUROLOGY | Facility: CLINIC | Age: 63
End: 2023-04-11
Payer: COMMERCIAL

## 2023-04-11 VITALS
SYSTOLIC BLOOD PRESSURE: 121 MMHG | BODY MASS INDEX: 23.98 KG/M2 | HEIGHT: 61 IN | HEART RATE: 87 BPM | WEIGHT: 127 LBS | DIASTOLIC BLOOD PRESSURE: 80 MMHG

## 2023-04-11 DIAGNOSIS — G57.53 TARSAL TUNNEL SYNDROME OF BOTH LOWER EXTREMITIES: Primary | ICD-10-CM

## 2023-04-11 DIAGNOSIS — M72.2 PLANTAR FASCIITIS, BILATERAL: ICD-10-CM

## 2023-04-11 PROCEDURE — 99999 PR PBB SHADOW E&M-EST. PATIENT-LVL IV: CPT | Mod: PBBFAC,,, | Performed by: PSYCHIATRY & NEUROLOGY

## 2023-04-11 PROCEDURE — 3008F PR BODY MASS INDEX (BMI) DOCUMENTED: ICD-10-PCS | Mod: CPTII,S$GLB,, | Performed by: PSYCHIATRY & NEUROLOGY

## 2023-04-11 PROCEDURE — 1160F RVW MEDS BY RX/DR IN RCRD: CPT | Mod: CPTII,S$GLB,, | Performed by: PSYCHIATRY & NEUROLOGY

## 2023-04-11 PROCEDURE — 1159F MED LIST DOCD IN RCRD: CPT | Mod: CPTII,S$GLB,, | Performed by: PSYCHIATRY & NEUROLOGY

## 2023-04-11 PROCEDURE — 3079F DIAST BP 80-89 MM HG: CPT | Mod: CPTII,S$GLB,, | Performed by: PSYCHIATRY & NEUROLOGY

## 2023-04-11 PROCEDURE — 3079F PR MOST RECENT DIASTOLIC BLOOD PRESSURE 80-89 MM HG: ICD-10-PCS | Mod: CPTII,S$GLB,, | Performed by: PSYCHIATRY & NEUROLOGY

## 2023-04-11 PROCEDURE — 99999 PR PBB SHADOW E&M-EST. PATIENT-LVL IV: ICD-10-PCS | Mod: PBBFAC,,, | Performed by: PSYCHIATRY & NEUROLOGY

## 2023-04-11 PROCEDURE — 3008F BODY MASS INDEX DOCD: CPT | Mod: CPTII,S$GLB,, | Performed by: PSYCHIATRY & NEUROLOGY

## 2023-04-11 PROCEDURE — 99204 OFFICE O/P NEW MOD 45 MIN: CPT | Mod: S$GLB,,, | Performed by: PSYCHIATRY & NEUROLOGY

## 2023-04-11 PROCEDURE — 1159F PR MEDICATION LIST DOCUMENTED IN MEDICAL RECORD: ICD-10-PCS | Mod: CPTII,S$GLB,, | Performed by: PSYCHIATRY & NEUROLOGY

## 2023-04-11 PROCEDURE — 3074F PR MOST RECENT SYSTOLIC BLOOD PRESSURE < 130 MM HG: ICD-10-PCS | Mod: CPTII,S$GLB,, | Performed by: PSYCHIATRY & NEUROLOGY

## 2023-04-11 PROCEDURE — 1160F PR REVIEW ALL MEDS BY PRESCRIBER/CLIN PHARMACIST DOCUMENTED: ICD-10-PCS | Mod: CPTII,S$GLB,, | Performed by: PSYCHIATRY & NEUROLOGY

## 2023-04-11 PROCEDURE — 3074F SYST BP LT 130 MM HG: CPT | Mod: CPTII,S$GLB,, | Performed by: PSYCHIATRY & NEUROLOGY

## 2023-04-11 PROCEDURE — 99204 PR OFFICE/OUTPT VISIT, NEW, LEVL IV, 45-59 MIN: ICD-10-PCS | Mod: S$GLB,,, | Performed by: PSYCHIATRY & NEUROLOGY

## 2023-04-11 NOTE — PROGRESS NOTES
[facility] NEUROLOGY  [facility], [place]    Date: 4/11/23  Patient Name: Carmen Manriquez   MRN: [id number]   PCP: Edwige Addison  Referring Provider: Self, Aaareferral    Chief Complaint:  Pain in the feet  Subjective:        HPI:   Ms. Carmen Manriquez is a 62 y.o. female presenting for evaluation of pain in the feet.  The patient shares that symptoms have been present in a symmetric fashion for about 1 year.  Originally saw podiatry who told the patient may be tarsal tunnel syndrome versus plantar fasciitis.  Patient expressed some reservations about her clinical experience thus prompting today's neurology visit.  She explains that her most troublesome symptom is intense, constant burning in the soles of the bilateral feet.  When prompted regarding whether 1 single area was more painful than others, the patient could not identify a single area.  When she wakes in the morning, bottom of the feet are very sensitive and painful.  Takes her several minutes of walking to get to current baseline where symptoms are more tolerable.  Uses orthotic devices which have not change course of symptoms.  Symptoms never escalate above the ankle.  Symptoms are never present on the top of each foot.  No history of diabetes.  No daily multivitamin.    Hemoglobin A1C   Date Value Ref Range Status   12/21/2022 5.4 4.0 - 5.6 % Final     Comment:     ADA Screening Guidelines:  5.7-6.4%  Consistent with prediabetes  >or=6.5%  Consistent with diabetes    High levels of fetal hemoglobin interfere with the HbA1C  assay. Heterozygous hemoglobin variants (HbS, HgC, etc)do  not significantly interfere with this assay.   However, presence of multiple variants may affect accuracy.     11/29/2021 5.6 4.0 - 5.6 % Final     Comment:     ADA Screening Guidelines:  5.7-6.4%  Consistent with prediabetes  >or=6.5%  Consistent with diabetes    High levels of fetal hemoglobin interfere with the HbA1C  assay. Heterozygous hemoglobin  variants (HbS, HgC, etc)do  not significantly interfere with this assay.   However, presence of multiple variants may affect accuracy.     2015 5.4 4.5 - 6.2 % Final       PAST MEDICAL HISTORY:  Past Medical History:   Diagnosis Date    Abnormal Pap smear     Allergy     Endometriosis, moderate     GERD (gastroesophageal reflux disease)     Heart palpitations      Patient Active Problem List   Diagnosis    Anxiety    Palpitations    Positive ALEXUS (antinuclear antibody)    Raynaud's phenomenon without gangrene    Acute dysfunction of eustachian tube    Pain of both hip joints    Chronic idiopathic constipation    Ductal hyperplasia of breast    At high risk for breast cancer    Well woman exam with routine gynecological exam    Hyperlipidemia    B12 deficiency    Vitamin D deficiency       PAST SURGICAL HISTORY:  Past Surgical History:   Procedure Laterality Date    BILE DUCT EXPLORATION      BREAST BIOPSY Left 2015    [place]     SECTION, CLASSIC      CHOLECYSTECTOMY      ENDOMETRIAL ABLATION      hx benign breast biopsy      leep      TONSILLECTOMY      TUBAL LIGATION         CURRENT MEDS:  Current Outpatient Medications   Medication Sig Dispense Refill    collagen, bovine, 100 % Powd       cyanocobalamin, vitamin B-12, 1,000 mcg TbSR Take 1 tablet by mouth once daily. 90 each 0    ergocalciferol (ERGOCALCIFEROL) 50,000 unit Cap Take 1 capsule (50,000 Units total) by mouth every 7 days. 12 capsule 4    EScitalopram oxalate (LEXAPRO) 10 MG tablet Take 1 tablet (10 mg total) by mouth once daily. 30 tablet 6    LORazepam (ATIVAN) 0.5 MG tablet TAKE 1 TABLET(0.5 MG) BY MOUTH EVERY NIGHT AS NEEDED FOR ANXIETY (Patient not taking: Reported on 2023) 30 tablet 0     No current facility-administered medications for this visit.       ALLERGIES:  Review of patient's allergies indicates:   Allergen Reactions    Ciprofloxacin Hives    Sulfa (sulfonamide antibiotics) Other (See Comments)     Cp        FAMILY  "HISTORY:  Family History   Problem Relation Age of Onset    Diabetes Mother     Cancer Brother         gallbladder ca    Alcohol abuse Father     Breast cancer Paternal Grandmother     Cancer Paternal Grandmother         breast    Cervical cancer Maternal Grandmother     Cancer Maternal Grandmother         cervical    Ovarian cancer Maternal Grandmother     Heart disease Maternal Uncle     Kidney disease Sister         nephrotic syndrome    No Known Problems Son     No Known Problems Sister     Colon cancer Neg Hx        SOCIAL HISTORY:  Social History     Tobacco Use    Smoking status: Former     Packs/day: 0.50     Types: Cigarettes     Quit date: 2021     Years since quittin.9    Smokeless tobacco: Never    Tobacco comments:     smoker since age 14, used to smoke 1 ppd   Substance Use Topics    Alcohol use: Yes     Alcohol/week: 4.0 standard drinks     Types: 4 Cans of beer per week    Drug use: No       Review of Systems:  Gen: no fever, no chills, no generalized feeling of weakness   HEENT: no double vision, no blurred vision, no eye pain, no eye exudates. no nasal congestion,   no traumatic injury of head, no neck pain, no neck stiffness. no photophobia or phonophobia at this time. ?    Heart: no chest pain, no SOB    Lungs: no SOB, no cough    MSK: no weakness of legs, intact ROM    ABD: no abd pain, no N/V/D/C, no difficulty with defecation.    Extremities: No leg pain, no edema.       Objective:     Vitals:    23 1337   BP: 121/80   BP Location: Right arm   Patient Position: Sitting   Pulse: 87   Weight: 57.6 kg (126 lb 15.8 oz)   Height: 5' 1" (1.549 m)     General: female in NAD, alert and awake, Aox3, well groomed. ?    ? ?    HEENT: Head is NC/AT EOMI, pupil size: 4 mm B/L, no nystagmus noted; hearing grossly intact b/l. Mucous membrane moist, uvula midline, no pharyngeal erythema, exudates or discharges.      Neck: Supple. no nuchal rigidity.      Cardiovascular: well perfused, no " cyanosis        Respiratory: Symmetric chest rise noted       Musculoskeletal: Muscle tone noted to be adequate for patient age, muscle mass is WNL. No spontaneous movements or fasciculations noted during this examination.       Extremities: No pedal edema or calf tenderness. No cogwheel rigidity noted on B/L UE extremities.      (+) Tinel sign bilaterally about the medial ankle  Deeper palpation to the anterior heel, center of the heel, anterior portion of the sole all equally uncomfortable for the patient     Neurological Examination.    Mental status: AA&O x3; Affect/mood is euthymic/congruent; no aphasia noted during examination. Patient answers simple questions appropriately & follows simple commands; no dysarthria or expressive aphasia; no reshma-neglect or extinction. Vocabulary/word finding: excellent.       Cranial Nerves: II-XII grossly intact bilaterally     Muscle Function: Tone WNL and Muscle bulk WNL. Left elbow flexors/extensors (5/5), Left shoulder (5/5); left Finger flexor/extensors (5/5). Right elbow flexors/extensors (5/5). Right shoulder abduction/flexion (5/5), Right finger flexors/extensors (5/5). Left LE hip flexion/extension (5/5), Left Knee flexion/extension (5/5) and Left ankle flexion/extension/Eversion/inversion (5/5). Right LE hip flexion/extension (5/5), Right Knee flexion/extension (5/5) and ankle flexion/extension/Eversion/inversion (5/5).       Sensory:  Intact to light touch throughout.  Vibratory sensation and temperature sensation intact in the feet.  Proprioception intact.     Reflexes:  2/4 throughout.     Coordination: no dysmetria (finger to nose negative)     Gait:  Stable, good stride length and arm swing but appears slightly uncomfortable at times    Assessment:   Carmen Manriquez is a 62 y.o. female presenting for evaluation of painful burning and paresthesias along the soles of the bilateral feet.  Differential includes plantar fasciitis and tarsal tunnel syndrome.  Elements  of her story align with each of these diagnosis making further testing necessary.  We will initiate EMG/NCV for further case definition.  We will also place referral to Orthopedics for consideration of injections versus other interventions as they deem necessary.    Plan:     Problem List Items Addressed This Visit    None  Visit Diagnoses       Tarsal tunnel syndrome of both lower extremities    -  Primary    Relevant Orders    EMG W/ ULTRASOUND AND NERVE CONDUCTION TEST 2 Extremities    Ambulatory referral/consult to Orthopedics    Plantar fasciitis, bilateral        Relevant Orders    Ambulatory referral/consult to Orthopedics          Patient was counseled we would reach out via phone or chart message regarding results of EMG/NCV when they become available.  Follow-up with our clinic in the future as needed if symptoms change or worsen.    I spent a total of 45 minutes on the day of the visit. This includes face to face time and non-face to face time preparing to see the patient (eg, review of tests), obtaining and/or reviewing separately obtained history, documenting clinical information in the electronic or other health record, independently interpreting results and communicating results to the patient/family/caregiver, or care coordinator.    A dictation device was used to produce this document. Use of such devices sometimes results in grammatical errors or replacement of words that sound similarly.    Montrell Regan, DO

## 2023-04-12 ENCOUNTER — TELEPHONE (OUTPATIENT)
Dept: ORTHOPEDICS | Facility: CLINIC | Age: 63
End: 2023-04-12
Payer: COMMERCIAL

## 2023-04-12 NOTE — TELEPHONE ENCOUNTER
----- Message from Hyun Mckeon sent at 4/12/2023 11:21 AM CDT -----  Contact: [phone]  Carmen Manriquez calling regarding Patient Advice (message) for requesting a call from Abby.

## 2023-04-12 NOTE — TELEPHONE ENCOUNTER
Spoke with pt.  Advised that I did speak with Dr Regan's office staff and they will fax orders for EMG to [facility] for scheduling.  Pt will call me once she is scheduled for EMG testing with [facility] and I will then schedule her to see Dr Stephens after EMG.

## 2023-04-14 ENCOUNTER — TELEPHONE (OUTPATIENT)
Dept: ORTHOPEDICS | Facility: CLINIC | Age: 63
End: 2023-04-14
Payer: COMMERCIAL

## 2023-04-14 NOTE — TELEPHONE ENCOUNTER
Spoke with pt.  Rescheduled her appointment with Dr Stephens to 7/19 at 1:00 pm  Pt having EMG at [facility] on 4/17 at 2:00 pm

## 2023-04-14 NOTE — TELEPHONE ENCOUNTER
----- Message from Yolis Whalen MA sent at 4/14/2023  1:12 PM CDT -----  Regarding: FW: apt  Contact: self [phone]    ----- Message -----  From: Guerline Marques  Sent: 4/14/2023   1:03 PM CDT  To: Shereen Stock Staff  Subject: apt                                              Pt calling to let Abby know pt  has made apt for  Monday 17,.23 at 2pm

## 2023-04-19 ENCOUNTER — HOSPITAL ENCOUNTER (OUTPATIENT)
Dept: RADIOLOGY | Facility: HOSPITAL | Age: 63
Discharge: HOME | End: 2023-04-19
Attending: ORTHOPAEDIC SURGERY
Payer: COMMERCIAL

## 2023-04-19 ENCOUNTER — OFFICE VISIT (OUTPATIENT)
Dept: ORTHOPEDICS | Facility: CLINIC | Age: 63
End: 2023-04-19
Payer: COMMERCIAL

## 2023-04-19 VITALS — WEIGHT: 127 LBS | BODY MASS INDEX: 23.98 KG/M2 | HEIGHT: 61 IN

## 2023-04-19 DIAGNOSIS — R52 PAIN: ICD-10-CM

## 2023-04-19 DIAGNOSIS — M72.2 PLANTAR FASCIITIS, BILATERAL: Primary | ICD-10-CM

## 2023-04-19 DIAGNOSIS — G57.53 TARSAL TUNNEL SYNDROME OF BOTH LOWER EXTREMITIES: ICD-10-CM

## 2023-04-19 PROCEDURE — 73630 X-RAY EXAM OF FOOT: CPT | Mod: 26,,, | Performed by: RADIOLOGY

## 2023-04-19 PROCEDURE — 3008F BODY MASS INDEX DOCD: CPT | Mod: CPTII,S$GLB,, | Performed by: ORTHOPAEDIC SURGERY

## 2023-04-19 PROCEDURE — 73630 X-RAY EXAM OF FOOT: CPT | Mod: TC,50

## 2023-04-19 PROCEDURE — 1159F PR MEDICATION LIST DOCUMENTED IN MEDICAL RECORD: ICD-10-PCS | Mod: CPTII,S$GLB,, | Performed by: ORTHOPAEDIC SURGERY

## 2023-04-19 PROCEDURE — 99999 PR PBB SHADOW E&M-EST. PATIENT-LVL III: CPT | Mod: PBBFAC,,, | Performed by: ORTHOPAEDIC SURGERY

## 2023-04-19 PROCEDURE — 99203 OFFICE O/P NEW LOW 30 MIN: CPT | Mod: S$GLB,,, | Performed by: ORTHOPAEDIC SURGERY

## 2023-04-19 PROCEDURE — 73630 XR FOOT COMPLETE 3 VIEW BILATERAL: ICD-10-PCS | Mod: 26,,, | Performed by: RADIOLOGY

## 2023-04-19 PROCEDURE — 99203 PR OFFICE/OUTPT VISIT, NEW, LEVL III, 30-44 MIN: ICD-10-PCS | Mod: S$GLB,,, | Performed by: ORTHOPAEDIC SURGERY

## 2023-04-19 PROCEDURE — 3008F PR BODY MASS INDEX (BMI) DOCUMENTED: ICD-10-PCS | Mod: CPTII,S$GLB,, | Performed by: ORTHOPAEDIC SURGERY

## 2023-04-19 PROCEDURE — 99999 PR PBB SHADOW E&M-EST. PATIENT-LVL III: ICD-10-PCS | Mod: PBBFAC,,, | Performed by: ORTHOPAEDIC SURGERY

## 2023-04-19 PROCEDURE — 1159F MED LIST DOCD IN RCRD: CPT | Mod: CPTII,S$GLB,, | Performed by: ORTHOPAEDIC SURGERY

## 2023-04-19 RX ORDER — GABAPENTIN 300 MG/1
300 CAPSULE ORAL 3 TIMES DAILY
Qty: 90 CAPSULE | Refills: 11 | Status: SHIPPED | OUTPATIENT
Start: 2023-04-19 | End: 2023-07-05 | Stop reason: SDUPTHER

## 2023-04-19 NOTE — PROGRESS NOTES
DATE: 2023  PATIENT: Carmen Manriquez    CHIEF COMPLAINT: bilateral foot pain/burning    HISTORY:  Carmen Manriquez is a 62 y.o. female here for initial evaluation of bilateral foot pain and burning mainly on the plantar aspect of each foot. She originally saw podiatry about 1 year ago that told her she had plantar fasciitis and possibly tarsal tunnel syndrome. She modified her activities, got some good insoles, and went to a chiropractor who did some stretches and dry needling. Unfortunately this not significantly improve her pain. She went to see neurology who ordered an EMG that came back as normal. Patient still experiencing some burning and pain in the bilateral soles of her foot when she stands for >30 minutes. The pain has been present for greater than 1 year. The patient describes the pain as burning/sharp pain.  The pain is worse with ambulation/weightbearing and improved by rest. There is no associated numbness and tingling, mainly burning.  Prior treatments have included dry needling, insoles, activity modification, stretches.      PAST MEDICAL/SURGICAL HISTORY:  Past Medical History:   Diagnosis Date    Abnormal Pap smear     Allergy     Endometriosis, moderate     GERD (gastroesophageal reflux disease)     Heart palpitations      Past Surgical History:   Procedure Laterality Date    BILE DUCT EXPLORATION      BREAST BIOPSY Left 2015    [place]     SECTION, CLASSIC      CHOLECYSTECTOMY      ENDOMETRIAL ABLATION      hx benign breast biopsy      leep      TONSILLECTOMY      TUBAL LIGATION         Current Medications:   Current Outpatient Medications:     collagen, bovine, 100 % Powd, , Disp: , Rfl:     cyanocobalamin, vitamin B-12, 1,000 mcg TbSR, Take 1 tablet by mouth once daily., Disp: 90 each, Rfl: 0    ergocalciferol (ERGOCALCIFEROL) 50,000 unit Cap, Take 1 capsule (50,000 Units total) by mouth every 7 days., Disp: 12 capsule, Rfl: 4    EScitalopram oxalate (LEXAPRO) 10 MG tablet, Take 1 tablet  (10 mg total) by mouth once daily., Disp: 30 tablet, Rfl: 6    LORazepam (ATIVAN) 0.5 MG tablet, TAKE 1 TABLET(0.5 MG) BY MOUTH EVERY NIGHT AS NEEDED FOR ANXIETY, Disp: 30 tablet, Rfl: 0    gabapentin (NEURONTIN) 300 MG capsule, Take 1 capsule (300 mg total) by mouth 3 (three) times daily., Disp: 90 capsule, Rfl: 11    Social History:   Social History     Socioeconomic History    Marital status:     Number of children: 1   Occupational History    Occupation:      Employer: [employer]   Tobacco Use    Smoking status: Former     Packs/day: 0.50     Types: Cigarettes     Quit date: 2021     Years since quittin.0    Smokeless tobacco: Never    Tobacco comments:     smoker since age 14, used to smoke 1 ppd   Substance and Sexual Activity    Alcohol use: Yes     Alcohol/week: 4.0 standard drinks     Types: 4 Cans of beer per week    Drug use: No    Sexual activity: Yes     Partners: Male     Birth control/protection: Post-menopausal   Social History Narrative    No exercise at present.     Social Determinants of Health     Financial Resource Strain: Low Risk     Difficulty of Paying Living Expenses: Not hard at all   Food Insecurity: No Food Insecurity    Worried About Running Out of Food in the Last Year: Never true    Ran Out of Food in the Last Year: Never true   Transportation Needs: No Transportation Needs    Lack of Transportation (Medical): No    Lack of Transportation (Non-Medical): No   Physical Activity: Inactive    Days of Exercise per Week: 0 days    Minutes of Exercise per Session: 0 min   Stress: Stress Concern Present    Feeling of Stress : To some extent   Social Connections: Unknown    Frequency of Communication with Friends and Family: Once a week    Frequency of Social Gatherings with Friends and Family: Once a week    Active Member of Clubs or Organizations: No    Attends Club or Organization Meetings: Never    Marital Status:    Housing  "Stability: Low Risk     Unable to Pay for Housing in the Last Year: No    Number of Places Lived in the Last Year: 1    Unstable Housing in the Last Year: No       REVIEW OF SYSTEMS:  Constitution: Negative. Negative for chills, fever and night sweats.   Cardiovascular: Negative for chest pain and syncope.   Respiratory: Negative for cough and shortness of breath.   Gastrointestinal: See HPI. Negative for nausea/vomiting. Negative for abdominal pain.  Genitourinary: See HPI. Negative for discoloration or dysuria.  Skin: Negative for dry skin, itching and rash.   Hematologic/Lymphatic: Negative for bleeding problem. Does not bruise/bleed easily.   Musculoskeletal: Negative for falls and muscle weakness.   Neurological: See HPI. No seizures.   Endocrine: Negative for polydipsia, polyphagia and polyuria.   Allergic/Immunologic: Negative for hives and persistent infections.    PHYSICAL EXAMINATION:    Ht 5' 1" (1.549 m)   Wt 57.6 kg (126 lb 15.8 oz)   BMI 23.99 kg/m²     General: The patient is a 62 y.o. female in no apparent distress, the patient is orientatied to person, place and time.   Psych: Normal mood and affect  HEENT:  NCAT, sclera nonicteric  Lungs:  Respirations are equal and unlabored.  CV:  2+ bilateral upper and lower extremity pulses.  Skin:  Intact throughout.  Musculoskeletal: No pain with the range of motion of the bilateral hips. No trochanteric tenderness to palpation. No pain with range of motion about the bilateral knees.      Bilateral Foot and Ankle Exam    INSPECTION:        Gait:    Normal    Scars:   None   Swelling:  None   Color:   Normal   Atrophy:  None  Heel / Toe Walking: No difficulty    ALIGNMENT:    Hindfoot  Normal    Midfoot: Normal  Forefoot: Normal     Collective Ankle-Hindfoot Alignment    Good -plantigrade (PG), well aligned    TENDERNESS:  LATERAL:      Sinus tarsi:  None    Syndesmosis:  none    ATFL:   none     CFL:   none    Anterolateral " gutter: none    Fibula:   none  Peroneal tendons: none    Peroneal tubercle.  None     ANTERIOR:  Anteromedial joint line:  none  Anterolateral joint line:  None  Talonavicular:    None  Anterior tibialis:   none  Extensor tendons:   none    POSTERIOR:  Medial/lateral achilles:   none  Medial/lateral achilles insertion: None    MEDIAL:      Deltoid:  none    Malleolus:  none    PTT:   none    Navicular:  none           CALCANEUS:  Retrocalcaneal:   none  Medial achilles:   None  Lateral achilles:   None  Calcaneal tuberosity:   None    FOOT:    Calcaneal cuboid  none   MT / MT heads:  none   Navicular   none    Medial cord origin PF:  Ttp bilaterally  Cuneiforms:   none    Web space:   none  Lisfranc    none    Tarsal tunnel:   none  Base of the fifth metatarsal  none   Tinels sign   neg        RANGE OF MOTION:  RIGHT/ LEFT      Ankle DF/PF:  15/45  15/45         Eversion/Inversion: 15/25 15/25     Midfoot ABD/ADD: 10/10 10/10     First MTP DF/PF: 60/25 60/25              (* = pain)                  STRENGTH: (affected)  Anterior tibialis: 5/5  Posterior tibialis: 5/5  Gastroc-soleus: 5/5  Peroneals:  5/5  EHL:   5/5  FHL:   5/5    (* = pain)    SPECIAL TESTS:   ANKLE INSTABILITY: (*pain)    Anterior drawer:   Normal      (C-W contralateral side)     Inversion:   30°     Eversion  10°            Collective Instability: (Ant-post and varus-valgus)     Stable        PROVOCATIVE TESTING:    Forced DF/ER: No pain at syndesmosis.    Mid-leg squeeze  No pain at syndesmosis    Forced DF:  No pain anterior joint line.      Forced PF:  No pain posterior ankle.     Forced INV:  No pain lateral    Forced EV:  No pain medial     Cabrals sign: Normal ankle plantar flexion.     Resisted peroneal No subluxation or pain    1st-2nd MT toggle No pain at Lisfranc    MT-T torque  No pain at Lisfranc     NEUROLOGIC TESTING:  All dermatomes foot, ankle and leg have normal sensation light touch  Ankle Reflexes 2+, symmetric    Negative Babinski and No Clonus    VASCULAR:  2+ pulses PT/DT with brisk capillary refill toes.        IMAGING:     Radiographs of the bilateral feet were ordered and personally reviewed with the patient today.  No acute fracture or dislocations noted        ASSESSMENT/PLAN:    Carmen was seen today for bilateral heel/plantar foot pain. She has mild pain with palpation of the medial plantar fascia and adductor hallucis muscle. EMG results were normal, which does not rule out tarsal tunnel syndrome. We will order an MRI of bilateral hindfoot to further assess her pain. WE will also start her on gabapentin. RTC after MRI.    Diagnoses and all orders for this visit:    Plantar fasciitis, bilateral  -     X-Ray Foot Complete Bilateral; Future  -     MRI Foot (Hindfoot) Right Without Contrast; Future  -     MRI Foot (Hindfoot) Left Without Contrast; Future  -     gabapentin (NEURONTIN) 300 MG capsule; Take 1 capsule (300 mg total) by mouth 3 (three) times daily.    Tarsal tunnel syndrome of both lower extremities  -     MRI Foot (Hindfoot) Right Without Contrast; Future  -     MRI Foot (Hindfoot) Left Without Contrast; Future  -     gabapentin (NEURONTIN) 300 MG capsule; Take 1 capsule (300 mg total) by mouth 3 (three) times daily.      I have personally taken the history and examined this patient and agree with the residents note as stated above.  EMG nerve conduction studies were reported to be normal but patient does have symptoms consistent with tarsal tunnel syndrome which may be secondary to plantar fasciitis or some other underlying soft tissue abnormality.  I would like to obtain an MRI to evaluate the plantar fascia as well as to rule out any other potential source of compression on the tibial and plantar nerves.  We had some discussion about possible surgery to release the plantar fascia as well as the tarsal tunnel.      Follow-up with results of MRI

## 2023-05-03 ENCOUNTER — HOSPITAL ENCOUNTER (OUTPATIENT)
Dept: RADIOLOGY | Facility: HOSPITAL | Age: 63
Discharge: HOME | End: 2023-05-03
Attending: ORTHOPAEDIC SURGERY
Payer: COMMERCIAL

## 2023-05-03 DIAGNOSIS — G57.53 TARSAL TUNNEL SYNDROME OF BOTH LOWER EXTREMITIES: ICD-10-CM

## 2023-05-03 DIAGNOSIS — M72.2 PLANTAR FASCIITIS, BILATERAL: ICD-10-CM

## 2023-05-03 PROCEDURE — 73718 MRI LOWER EXTREMITY W/O DYE: CPT | Mod: 26,LT,, | Performed by: RADIOLOGY

## 2023-05-03 PROCEDURE — 73718 MRI FOOT (HINDFOOT) RIGHT WITHOUT CONTRAST: ICD-10-PCS | Mod: 26,RT,, | Performed by: RADIOLOGY

## 2023-05-03 PROCEDURE — 73718 MRI LOWER EXTREMITY W/O DYE: CPT | Mod: TC,LT

## 2023-05-03 PROCEDURE — 73718 MRI LOWER EXTREMITY W/O DYE: CPT | Mod: 26,RT,, | Performed by: RADIOLOGY

## 2023-05-03 PROCEDURE — 73718 MRI FOOT (HINDFOOT) LEFT WITHOUT CONTRAST: ICD-10-PCS | Mod: 26,LT,, | Performed by: RADIOLOGY

## 2023-05-03 PROCEDURE — 73718 MRI LOWER EXTREMITY W/O DYE: CPT | Mod: TC,RT

## 2023-05-08 ENCOUNTER — TELEPHONE (OUTPATIENT)
Dept: ORTHOPEDICS | Facility: CLINIC | Age: 63
End: 2023-05-08
Payer: COMMERCIAL

## 2023-05-08 ENCOUNTER — OFFICE VISIT (OUTPATIENT)
Dept: ORTHOPEDICS | Facility: CLINIC | Age: 63
End: 2023-05-08
Payer: COMMERCIAL

## 2023-05-08 VITALS — WEIGHT: 127 LBS | BODY MASS INDEX: 23.98 KG/M2 | HEIGHT: 61 IN

## 2023-05-08 DIAGNOSIS — G89.29 CHRONIC HEEL PAIN, UNSPECIFIED LATERALITY: Primary | ICD-10-CM

## 2023-05-08 DIAGNOSIS — M79.673 CHRONIC HEEL PAIN, UNSPECIFIED LATERALITY: Primary | ICD-10-CM

## 2023-05-08 PROCEDURE — 99213 PR OFFICE/OUTPT VISIT, EST, LEVL III, 20-29 MIN: ICD-10-PCS | Mod: 95,,, | Performed by: ORTHOPAEDIC SURGERY

## 2023-05-08 PROCEDURE — 1159F PR MEDICATION LIST DOCUMENTED IN MEDICAL RECORD: ICD-10-PCS | Mod: CPTII,95,, | Performed by: ORTHOPAEDIC SURGERY

## 2023-05-08 PROCEDURE — 3008F BODY MASS INDEX DOCD: CPT | Mod: CPTII,95,, | Performed by: ORTHOPAEDIC SURGERY

## 2023-05-08 PROCEDURE — 1159F MED LIST DOCD IN RCRD: CPT | Mod: CPTII,95,, | Performed by: ORTHOPAEDIC SURGERY

## 2023-05-08 PROCEDURE — 3008F PR BODY MASS INDEX (BMI) DOCUMENTED: ICD-10-PCS | Mod: CPTII,95,, | Performed by: ORTHOPAEDIC SURGERY

## 2023-05-08 PROCEDURE — 99213 OFFICE O/P EST LOW 20 MIN: CPT | Mod: 95,,, | Performed by: ORTHOPAEDIC SURGERY

## 2023-05-08 NOTE — TELEPHONE ENCOUNTER
"I spoke with pt,notified the pt that  will give her a call at 2:45pm. Pt,verbalized understanding,.        ----- Message from Mi Victor sent at 5/8/2023  2:06 PM CDT -----  Regarding: Appt Access  Contact: pt [phone]  Pt calling to advise she is seeing an "After Visit " summary in her portal when she has not spoken or attended virtual appt with Dr. Stephens. Pt's appt is for 2:45. Pt is waiting for doctor into virtual visit or call audio. Pls call as soon as possible.     "

## 2023-05-08 NOTE — PROGRESS NOTES
Established Patient - Audio Only Telehealth Visit     The patient location is:  [place]  The chief complaint leading to consultation is:  MRI results  Visit type: Virtual visit with audio only (telephone)  Total time spent with patient:  5 minute       The reason for the audio only service rather than synchronous audio and video virtual visit was related to technical difficulties or patient preference/necessity.     Each patient to whom I provide medical services by telemedicine is:  (1) informed of the relationship between the physician and patient and the respective role of any other health care provider with respect to management of the patient; and (2) notified that they may decline to receive medical services by telemedicine and may withdraw from such care at any time. Patient verbally consented to receive this service via voice-only telephone call.       HPI:  This is a 63-year-old female who presented to me on 04/19/2023 with a one year history of bilateral burning foot and heel pain.  She was originally seen by Podiatry and was diagnosed with bilateral plantar fasciitis and possible tarsal tunnel syndrome.  She took appropriate conservative measures and also saw a chiropractor and her pain did not significantly improve over time.  She ultimately saw a neurologist who ordered an EMG nerve conduction study which was normal.  When she presented to me she was still experiencing burning pain in the plantar aspects of both feet whenever she stands more than 30 minutes and described the pain as burning and sharp.  Because of her chronic pain I ordered MRIs of both feet.  I also placed her on gabapentin.    MRI results:  MRI of both feet performed on 05/23/2023 does not reveal any obvious structural abnormalities related to the plantar fascia or the tarsal tunnel on both feet.  An incidental small ganglion cyst is noted on the plantar aspect of the 2nd tarsometatarsal joint in the midfoot which does not correlate  with her pain.     Assessment and plan:    1. Chronic heel pain, bilateral          Recommendation:  I discussed the results of the MRI with Ms. Manriquez and explained to her that there is no MRI evidence of plantar fasciitis on either heel.  In addition, she is had previous EMG nerve conduction studies which were normal.  I explained to her that these normal studies do not completely rule out the possibility of having one of these issues but to recommend any surgical intervention would be risky with regards to predicting outcomes.  Her symptoms have been going on now for over a year.  She has had previous rheumatologic evaluation and positive ALEXUS studies prior to her onset of heel pain.  I am thinking that a repeat evaluation from a rheumatologic standpoint maybe warranted.  I also offered her corticosteroid injections of one or both heels to see if this gives her any relief but she would prefer to hold out on any injections.  I am going to send a note to her primary care physician regarding her visits with me.  If she decides she would like to come in for further intervention she will call let us know                        This service was not originating from a related E/M service provided within the previous 7 days nor will  to an E/M service or procedure within the next 24 hours or my soonest available appointment.  Prevailing standard of care was able to be met in this audio-only visit.

## 2023-05-10 ENCOUNTER — TELEPHONE (OUTPATIENT)
Dept: INTERNAL MEDICINE | Facility: CLINIC | Age: 63
End: 2023-05-10
Payer: COMMERCIAL

## 2023-05-10 DIAGNOSIS — M79.673 HEEL PAIN, UNSPECIFIED LATERALITY: ICD-10-CM

## 2023-05-10 DIAGNOSIS — M25.50 ARTHRALGIA, UNSPECIFIED JOINT: Primary | ICD-10-CM

## 2023-05-10 NOTE — TELEPHONE ENCOUNTER
Note from her othopedist  dr. Stephens recommending  Rheumatology follow up for her heel and joint pains  Evaluation  Referral in she has seen dr. Up previously assist in scheudling

## 2023-05-17 ENCOUNTER — OFFICE VISIT (OUTPATIENT)
Dept: RHEUMATOLOGY | Facility: CLINIC | Age: 63
End: 2023-05-17
Payer: COMMERCIAL

## 2023-05-17 ENCOUNTER — LAB VISIT (OUTPATIENT)
Dept: LAB | Facility: HOSPITAL | Age: 63
End: 2023-05-17
Attending: INTERNAL MEDICINE
Payer: COMMERCIAL

## 2023-05-17 VITALS
HEIGHT: 61 IN | HEART RATE: 74 BPM | DIASTOLIC BLOOD PRESSURE: 71 MMHG | WEIGHT: 129.88 LBS | BODY MASS INDEX: 24.52 KG/M2 | SYSTOLIC BLOOD PRESSURE: 112 MMHG

## 2023-05-17 DIAGNOSIS — R30.0 DYSURIA: ICD-10-CM

## 2023-05-17 DIAGNOSIS — M25.50 ARTHRALGIA, UNSPECIFIED JOINT: ICD-10-CM

## 2023-05-17 DIAGNOSIS — M25.50 PAIN IN JOINT INVOLVING MULTIPLE SITES: ICD-10-CM

## 2023-05-17 DIAGNOSIS — Z55.9 EDUCATIONAL CIRCUMSTANCE: ICD-10-CM

## 2023-05-17 DIAGNOSIS — I73.00 RAYNAUD'S PHENOMENON WITHOUT GANGRENE: Chronic | ICD-10-CM

## 2023-05-17 DIAGNOSIS — R76.8 POSITIVE ANA (ANTINUCLEAR ANTIBODY): ICD-10-CM

## 2023-05-17 DIAGNOSIS — M79.673 HEEL PAIN, UNSPECIFIED LATERALITY: ICD-10-CM

## 2023-05-17 DIAGNOSIS — M25.50 PAIN IN JOINT INVOLVING MULTIPLE SITES: Primary | ICD-10-CM

## 2023-05-17 LAB
C3 SERPL-MCNC: 109 MG/DL (ref 50–180)
C4 SERPL-MCNC: 28 MG/DL (ref 11–44)
CRP SERPL-MCNC: 1.7 MG/L (ref 0–8.2)
ERYTHROCYTE [SEDIMENTATION RATE] IN BLOOD BY PHOTOMETRIC METHOD: 9 MM/HR (ref 0–36)

## 2023-05-17 PROCEDURE — 1160F RVW MEDS BY RX/DR IN RCRD: CPT | Mod: CPTII,S$GLB,, | Performed by: INTERNAL MEDICINE

## 2023-05-17 PROCEDURE — 86038 ANTINUCLEAR ANTIBODIES: CPT | Performed by: INTERNAL MEDICINE

## 2023-05-17 PROCEDURE — 99999 PR PBB SHADOW E&M-EST. PATIENT-LVL IV: CPT | Mod: PBBFAC,,, | Performed by: INTERNAL MEDICINE

## 2023-05-17 PROCEDURE — 36415 COLL VENOUS BLD VENIPUNCTURE: CPT | Performed by: INTERNAL MEDICINE

## 2023-05-17 PROCEDURE — 86039 ANTINUCLEAR ANTIBODIES (ANA): CPT | Performed by: INTERNAL MEDICINE

## 2023-05-17 PROCEDURE — 3078F PR MOST RECENT DIASTOLIC BLOOD PRESSURE < 80 MM HG: ICD-10-PCS | Mod: CPTII,S$GLB,, | Performed by: INTERNAL MEDICINE

## 2023-05-17 PROCEDURE — 3074F SYST BP LT 130 MM HG: CPT | Mod: CPTII,S$GLB,, | Performed by: INTERNAL MEDICINE

## 2023-05-17 PROCEDURE — 99999 PR PBB SHADOW E&M-EST. PATIENT-LVL IV: ICD-10-PCS | Mod: PBBFAC,,, | Performed by: INTERNAL MEDICINE

## 2023-05-17 PROCEDURE — 86160 COMPLEMENT ANTIGEN: CPT | Mod: 59 | Performed by: INTERNAL MEDICINE

## 2023-05-17 PROCEDURE — 3074F PR MOST RECENT SYSTOLIC BLOOD PRESSURE < 130 MM HG: ICD-10-PCS | Mod: CPTII,S$GLB,, | Performed by: INTERNAL MEDICINE

## 2023-05-17 PROCEDURE — 3008F PR BODY MASS INDEX (BMI) DOCUMENTED: ICD-10-PCS | Mod: CPTII,S$GLB,, | Performed by: INTERNAL MEDICINE

## 2023-05-17 PROCEDURE — 85652 RBC SED RATE AUTOMATED: CPT | Performed by: INTERNAL MEDICINE

## 2023-05-17 PROCEDURE — 1159F MED LIST DOCD IN RCRD: CPT | Mod: CPTII,S$GLB,, | Performed by: INTERNAL MEDICINE

## 2023-05-17 PROCEDURE — 86160 COMPLEMENT ANTIGEN: CPT | Performed by: INTERNAL MEDICINE

## 2023-05-17 PROCEDURE — 86140 C-REACTIVE PROTEIN: CPT | Performed by: INTERNAL MEDICINE

## 2023-05-17 PROCEDURE — 3078F DIAST BP <80 MM HG: CPT | Mod: CPTII,S$GLB,, | Performed by: INTERNAL MEDICINE

## 2023-05-17 PROCEDURE — 1160F PR REVIEW ALL MEDS BY PRESCRIBER/CLIN PHARMACIST DOCUMENTED: ICD-10-PCS | Mod: CPTII,S$GLB,, | Performed by: INTERNAL MEDICINE

## 2023-05-17 PROCEDURE — 86235 NUCLEAR ANTIGEN ANTIBODY: CPT | Performed by: INTERNAL MEDICINE

## 2023-05-17 PROCEDURE — 1159F PR MEDICATION LIST DOCUMENTED IN MEDICAL RECORD: ICD-10-PCS | Mod: CPTII,S$GLB,, | Performed by: INTERNAL MEDICINE

## 2023-05-17 PROCEDURE — 99204 PR OFFICE/OUTPT VISIT, NEW, LEVL IV, 45-59 MIN: ICD-10-PCS | Mod: S$GLB,,, | Performed by: INTERNAL MEDICINE

## 2023-05-17 PROCEDURE — 99204 OFFICE O/P NEW MOD 45 MIN: CPT | Mod: S$GLB,,, | Performed by: INTERNAL MEDICINE

## 2023-05-17 PROCEDURE — 86235 NUCLEAR ANTIGEN ANTIBODY: CPT | Mod: 59 | Performed by: INTERNAL MEDICINE

## 2023-05-17 PROCEDURE — 3008F BODY MASS INDEX DOCD: CPT | Mod: CPTII,S$GLB,, | Performed by: INTERNAL MEDICINE

## 2023-05-17 SDOH — SOCIAL DETERMINANTS OF HEALTH (SDOH): PROBLEMS RELATED TO EDUCATION AND LITERACY, UNSPECIFIED: Z55.9

## 2023-05-17 ASSESSMENT — ROUTINE ASSESSMENT OF PATIENT INDEX DATA (RAPID3)
AM STIFFNESS SCORE: 1, YES
WHEN YOU AWAKENED IN THE MORNING OVER THE LAST WEEK, PLEASE INDICATE THE AMOUNT OF TIME IT TAKES UNTIL YOU ARE AS LIMBER AS YOU WILL BE FOR THE DAY: 1
PSYCHOLOGICAL DISTRESS SCORE: 3.3
TOTAL RAPID3 SCORE: 3.78
PATIENT GLOBAL ASSESSMENT SCORE: 2
MDHAQ FUNCTION SCORE: 0.4
PAIN SCORE: 8
FATIGUE SCORE: 4

## 2023-05-17 NOTE — PATIENT INSTRUCTIONS
Read on fibromyalgia.  Go to [email or web address].org    Begin a program of daily, aerobic, low impact, dedicated exercise.  Don't overdo; don't underdo.  Always warm up & cool down.    Begin adjusting gabapentin: larger doses at bedtime & discuss smaller doses during the day  ie gabapentin 100 mg during the day once or twice.    After equilibrating gabapentin, if you still need more pain/anxiety relief, switch lexapro to duloxetine.    Or can begin with switching lexapro to duloxetine before adjusting gabapentin.

## 2023-05-17 NOTE — PROGRESS NOTES
Rapid3 Question Responses and Scores 5/10/2023   MDHAQ Score 0.4   Psychologic Score 3.3   Pain Score 8   When you awakened in the morning OVER THE LAST WEEK, did you feel stiff? Yes   If Yes, please indicate the number of hours until you are as limber as you will be for the day 1   Fatigue Score 4   Global Health Score 2   RAPID3 Score 3.78

## 2023-05-17 NOTE — PROGRESS NOTES
Answers submitted by the patient for this visit:  Rheumatology Questionnaire (Submitted on 5/10/2023)  fever: No  eye redness: No  mouth sores: No  headaches: Yes  shortness of breath: No  chest pain: No  trouble swallowing: No  diarrhea: No  constipation: No  unexpected weight change: No  genital sore: No  dysuria: No  During the last 3 days, have you had a skin rash?: No  Bruises or bleeds easily: No  cough: No  Subjective:     Patient ID: Carmen Manriquez is a 63 y.o. female sent by Dr. Edwige Addison for consultation on arthralgia    Chief Complaint: No chief complaint on file.       HPI    She was previously followed by Dr. Zoran Up -last 7/18/18 for LBP & myalgia and more recently by Jess Stephens [facility] for bilateral plantar fasciitis and bilateral tarsal tunnel syndrome. EMG w US & NCV of LE requested. Report not found but according to patient it was wnl. MRIs of feet, likewise did not show plantar fasciitis.     Patient c/o joint & muscle pain in multiple sites. Worse in last 1.5 yrs is bilateral foot pain. Both feet hurt and burn w & wo activity. Patient can't stand up for long & has trouble exercising; sxs are worse at end of the day.   Gabapentin 300 mg helps but feels groggy w daytime doses & has not been taking it.   Never on duloxetine  Other muscle/joint aches neck, low back, elbows & fingers.   There never has been any visible swelling.    Review of labs w +ALEXUS & neg pro from many years ago.  Has dx of  Raynaud's made remotely:  fingers mostly turn blue in cold; toes also a bit; does not report 2 color change.     AM stiffness x  1/2 - 1 hr: all over  Denies dysphagia, tight skin, oral ulcers, parotid gland swelling, dry eyes, dry mouth, pleurisy, pericarditis, skin rashes, miscarriages (0/1), thromboses, muscle weakness; fevers,  conjunctivitis, chronic or bloody diarrhea, vaginal/urethral D/C, thyroid issues;  Some hair thinning;   Burns easily in sun--like a rash;   Headaches  No  family hx of CTDs but father's hx not well known.    CSI = 54 = Severe  Patient answers always or often to:   Feeling tired & unrefreshed upon awakening  Muscles feel stiff and achy.  Grinding or clenching teeth  Sensitivity to bright lights  Tires easily when physically active  Headaches  Not sleeping well  Difficulty concentration  Low energy  Muscle tension in neck & shoulders  Legs uncomfortable & restless when trying to get to sleep  Difficulty remembering things    Prior DX:  RLS  Anxiety or Panic attacks    Fibroassess:  Widespread pain index--4  Symptom severity score--7      Current Outpatient Medications   Medication Sig Dispense Refill    cyanocobalamin, vitamin B-12, 1,000 mcg TbSR Take 1 tablet by mouth once daily. 90 each 0    ergocalciferol (ERGOCALCIFEROL) 50,000 unit Cap Take 1 capsule (50,000 Units total) by mouth every 7 days. 12 capsule 4    EScitalopram oxalate (LEXAPRO) 10 MG tablet Take 1 tablet (10 mg total) by mouth once daily. 30 tablet 6    gabapentin (NEURONTIN) 300 MG capsule Take 1 capsule (300 mg total) by mouth 3 (three) times daily. (Patient taking differently: Take 300 mg by mouth 3 (three) times daily. Taking only 1 gabapentin when needed for sleep) 90 capsule 11    LORazepam (ATIVAN) 0.5 MG tablet TAKE 1 TABLET(0.5 MG) BY MOUTH EVERY NIGHT AS NEEDED FOR ANXIETY 30 tablet 0     No current facility-administered medications for this visit.           Review of patient's allergies indicates:   Allergen Reactions    Ciprofloxacin Hives    Sulfa (sulfonamide antibiotics) Other (See Comments)     Cp        Review of Systems   Constitutional:  Negative for fever and unexpected weight change.   HENT:  Negative for mouth sores and trouble swallowing.    Eyes: Negative.  Negative for redness.   Respiratory:  Negative for cough and shortness of breath.    Cardiovascular:  Positive for palpitations (often;). Negative for chest pain.   Gastrointestinal: Negative.  Negative for abdominal  distention, constipation and diarrhea.   Genitourinary:  Positive for dysuria (this Am; hx of UTIs;) and menstrual problem (painful). Negative for genital sores.        Had endometriosis in past; had surgeries for it.   Musculoskeletal:  Positive for back pain, neck pain and neck stiffness.   Skin:  Negative for rash.   Neurological:  Positive for headaches.   Hematological:  Does not bruise/bleed easily.   Psychiatric/Behavioral:  Positive for decreased concentration, dysphoric mood and sleep disturbance (falling & staying). Negative for agitation, behavioral problems and confusion. The patient is nervous/anxious.      Past Medical History:   Diagnosis Date    Abnormal Pap smear     Allergy     Endometriosis, moderate     GERD (gastroesophageal reflux disease)     Heart palpitations        Past Surgical History:   Procedure Laterality Date    BILE DUCT EXPLORATION      BREAST BIOPSY Left 2015    [place]     SECTION, CLASSIC      CHOLECYSTECTOMY      ENDOMETRIAL ABLATION      hx benign breast biopsy      leep      TONSILLECTOMY      TUBAL LIGATION         Family History   Problem Relation Age of Onset    Diabetes Mother     Cancer Brother         gallbladder ca    Alcohol abuse Father     Breast cancer Paternal Grandmother     Cancer Paternal Grandmother         breast    Cervical cancer Maternal Grandmother     Cancer Maternal Grandmother         cervical    Ovarian cancer Maternal Grandmother     Heart disease Maternal Uncle     Kidney disease Sister         nephrotic syndrome    No Known Problems Son     No Known Problems Sister     Colon cancer Neg Hx     dx w pancreatic cancer--doing well.  1 Sister: nephrotic syndrome etiology not known  1 Sister: alcoholic  1 Brother:  gallbladder cancer  Father alcoholic  Son with asthma      Social History     Tobacco Use    Smoking status: Former     Packs/day: 0.50     Types: Cigarettes     Quit date: 2021     Years since quittin.0    Smokeless  "tobacco: Never    Tobacco comments:     smoker since age 14, used to smoke 1 ppd   Substance Use Topics    Alcohol use: Yes     Alcohol/week: 4.0 standard drinks     Types: 4 Cans of beer per week    Drug use: No   Began smoking again: 1/2 ppd.  ETOH: every other week up to 3-4 each time    Objective:     /71   Pulse 74   Ht 5' 1" (1.549 m)   Wt 58.9 kg (129 lb 13.6 oz)   BMI 24.54 kg/m²     Physical Exam   Constitutional: She is oriented to person, place, and time. She appears well-developed and well-nourished. No distress.   HENT:   Head: Normocephalic and atraumatic.   Mouth/Throat: Oropharynx is clear and moist. No oropharyngeal exudate.   No facial rashes  Parotids not enlarged     Eyes: Pupils are equal, round, and reactive to light. Conjunctivae are normal. Right eye exhibits no discharge. Left eye exhibits no discharge. No scleral icterus.   Neck: No JVD present. No tracheal deviation present. No thyromegaly present.   Cardiovascular: Normal rate, regular rhythm and normal heart sounds. Exam reveals no gallop and no friction rub.   No murmur heard.  Pulmonary/Chest: Effort normal and breath sounds normal. No respiratory distress. She has no wheezes. She has no rales. She exhibits no tenderness.   Abdominal: Soft. Bowel sounds are normal. She exhibits no distension and no mass. There is no abdominal tenderness. There is no rebound and no guarding.   Musculoskeletal:         General: No tenderness or deformity. Normal range of motion.      Cervical back: Neck supple.      Comments:      Lymphadenopathy:     She has no cervical adenopathy.   Neurological: She is alert and oriented to person, place, and time. She has normal reflexes. No cranial nerve deficit. Gait normal.   Skin: Skin is dry. No rash noted. She is not diaphoretic.   Psychiatric: Mood, memory and affect normal.   Vitals reviewed.      3/13/18: ALEXUS 1:320 H; neg pro; RF/CCP neg  Imaging reviewed with patient: unremarkable: MRI of L foot " ok no plantar fasciitis  MRI of R foot: ganglion cyst at the plantar aspect of the 2nd tarsometatarsal joint; no plantar fasciitis     Assessment:   Joint pain multiple sites   Worse feet paresthesias   Some response to gabapentin  CSS associated w hx of ICS & RLS  Anxiety/depression  + ALEXUS   Raynaud's by hx  Current dysuria   S/P ICS   S/P UTIs    Plan:   Reassurance.  Discussed CSS  Discussed fibromyalgia which is related but she does not fulfill criteria.  Handout provided  Website discussed  Discussed benefits of exercise  Discussed value of adjusting gabapentin and/or switching to duloxetine from lexapro.  In abundance of caution and with patient's request will get labs & UA  Patient is aware that I do not manage CSS or Fibromyalgia.  She will f/u w PCP and/or neurologist      CC: Edwige Addison MD

## 2023-05-18 LAB
ANA PATTERN 1: NORMAL
ANA SER QL IF: POSITIVE
ANA TITR SER IF: NORMAL {TITER}

## 2023-05-19 LAB
ANTI SM ANTIBODY: 0.13 RATIO (ref 0–0.99)
ANTI SM/RNP ANTIBODY: 0.15 RATIO (ref 0–0.99)
ANTI-SM INTERPRETATION: NEGATIVE
ANTI-SM/RNP INTERPRETATION: NEGATIVE
ANTI-SSA ANTIBODY: 0.1 RATIO (ref 0–0.99)
ANTI-SSA ANTIBODY: 0.1 RATIO (ref 0–0.99)
ANTI-SSA INTERPRETATION: NEGATIVE
ANTI-SSA INTERPRETATION: NEGATIVE
ANTI-SSB ANTIBODY: 0.08 RATIO (ref 0–0.99)
ANTI-SSB INTERPRETATION: NEGATIVE
DSDNA AB SER-ACNC: NORMAL [IU]/ML

## 2023-06-05 ENCOUNTER — OFFICE VISIT (OUTPATIENT)
Dept: INTERNAL MEDICINE | Facility: CLINIC | Age: 63
End: 2023-06-05
Payer: COMMERCIAL

## 2023-06-05 ENCOUNTER — HOSPITAL ENCOUNTER (OUTPATIENT)
Dept: RADIOLOGY | Facility: HOSPITAL | Age: 63
Discharge: HOME | End: 2023-06-05
Attending: INTERNAL MEDICINE
Payer: COMMERCIAL

## 2023-06-05 VITALS
BODY MASS INDEX: 24.35 KG/M2 | HEART RATE: 79 BPM | WEIGHT: 129 LBS | OXYGEN SATURATION: 99 % | HEIGHT: 61 IN | SYSTOLIC BLOOD PRESSURE: 118 MMHG | DIASTOLIC BLOOD PRESSURE: 70 MMHG

## 2023-06-05 DIAGNOSIS — R10.9 FLANK PAIN: ICD-10-CM

## 2023-06-05 DIAGNOSIS — E78.5 HYPERLIPIDEMIA, UNSPECIFIED HYPERLIPIDEMIA TYPE: ICD-10-CM

## 2023-06-05 DIAGNOSIS — Z72.0 TOBACCO USE: ICD-10-CM

## 2023-06-05 DIAGNOSIS — E53.8 B12 DEFICIENCY: ICD-10-CM

## 2023-06-05 DIAGNOSIS — M79.671 FOOT PAIN, BILATERAL: Primary | ICD-10-CM

## 2023-06-05 DIAGNOSIS — F41.9 ANXIETY: ICD-10-CM

## 2023-06-05 DIAGNOSIS — M79.672 PAIN IN BOTH FEET: ICD-10-CM

## 2023-06-05 DIAGNOSIS — R10.9 ABDOMINAL PAIN, UNSPECIFIED ABDOMINAL LOCATION: ICD-10-CM

## 2023-06-05 DIAGNOSIS — M79.672 FOOT PAIN, BILATERAL: Primary | ICD-10-CM

## 2023-06-05 DIAGNOSIS — M79.671 PAIN IN BOTH FEET: ICD-10-CM

## 2023-06-05 PROCEDURE — 1159F MED LIST DOCD IN RCRD: CPT | Mod: CPTII,S$GLB,, | Performed by: INTERNAL MEDICINE

## 2023-06-05 PROCEDURE — 3074F SYST BP LT 130 MM HG: CPT | Mod: CPTII,S$GLB,, | Performed by: INTERNAL MEDICINE

## 2023-06-05 PROCEDURE — 71046 XR CHEST PA AND LATERAL: ICD-10-PCS | Mod: 26,,, | Performed by: RADIOLOGY

## 2023-06-05 PROCEDURE — 99214 PR OFFICE/OUTPT VISIT, EST, LEVL IV, 30-39 MIN: ICD-10-PCS | Mod: S$GLB,,, | Performed by: INTERNAL MEDICINE

## 2023-06-05 PROCEDURE — 3008F BODY MASS INDEX DOCD: CPT | Mod: CPTII,S$GLB,, | Performed by: INTERNAL MEDICINE

## 2023-06-05 PROCEDURE — 99214 OFFICE O/P EST MOD 30 MIN: CPT | Mod: S$GLB,,, | Performed by: INTERNAL MEDICINE

## 2023-06-05 PROCEDURE — 71046 X-RAY EXAM CHEST 2 VIEWS: CPT | Mod: TC

## 2023-06-05 PROCEDURE — 3078F DIAST BP <80 MM HG: CPT | Mod: CPTII,S$GLB,, | Performed by: INTERNAL MEDICINE

## 2023-06-05 PROCEDURE — 99999 PR PBB SHADOW E&M-EST. PATIENT-LVL IV: ICD-10-PCS | Mod: PBBFAC,,, | Performed by: INTERNAL MEDICINE

## 2023-06-05 PROCEDURE — 1159F PR MEDICATION LIST DOCUMENTED IN MEDICAL RECORD: ICD-10-PCS | Mod: CPTII,S$GLB,, | Performed by: INTERNAL MEDICINE

## 2023-06-05 PROCEDURE — 3074F PR MOST RECENT SYSTOLIC BLOOD PRESSURE < 130 MM HG: ICD-10-PCS | Mod: CPTII,S$GLB,, | Performed by: INTERNAL MEDICINE

## 2023-06-05 PROCEDURE — 1160F RVW MEDS BY RX/DR IN RCRD: CPT | Mod: CPTII,S$GLB,, | Performed by: INTERNAL MEDICINE

## 2023-06-05 PROCEDURE — 3078F PR MOST RECENT DIASTOLIC BLOOD PRESSURE < 80 MM HG: ICD-10-PCS | Mod: CPTII,S$GLB,, | Performed by: INTERNAL MEDICINE

## 2023-06-05 PROCEDURE — 1160F PR REVIEW ALL MEDS BY PRESCRIBER/CLIN PHARMACIST DOCUMENTED: ICD-10-PCS | Mod: CPTII,S$GLB,, | Performed by: INTERNAL MEDICINE

## 2023-06-05 PROCEDURE — 3008F PR BODY MASS INDEX (BMI) DOCUMENTED: ICD-10-PCS | Mod: CPTII,S$GLB,, | Performed by: INTERNAL MEDICINE

## 2023-06-05 PROCEDURE — 99999 PR PBB SHADOW E&M-EST. PATIENT-LVL IV: CPT | Mod: PBBFAC,,, | Performed by: INTERNAL MEDICINE

## 2023-06-05 PROCEDURE — 71046 X-RAY EXAM CHEST 2 VIEWS: CPT | Mod: 26,,, | Performed by: RADIOLOGY

## 2023-06-05 RX ORDER — DULOXETIN HYDROCHLORIDE 20 MG/1
20 CAPSULE, DELAYED RELEASE ORAL DAILY
Qty: 30 CAPSULE | Refills: 11 | Status: SHIPPED | OUTPATIENT
Start: 2023-06-05 | End: 2023-06-16 | Stop reason: ALTCHOICE

## 2023-06-05 RX ORDER — LORAZEPAM 0.5 MG/1
TABLET ORAL
Qty: 30 TABLET | Refills: 1 | Status: SHIPPED | OUTPATIENT
Start: 2023-06-05 | End: 2023-08-29 | Stop reason: SDUPTHER

## 2023-06-05 NOTE — PROGRESS NOTES
"Subjective:       Patient ID: Carmen Manriquez is a 63 y.o. female.    Chief Complaint: Follow-up  This is a 63-year-old who presents today for follow-up she is been having a few concerns reports that she is been having some issues with her feet for the last few years under bottom of her feet she is seen Podiatry and was treated with orthotics for plantar fasciitis but symptoms did not seem to get better she ultimately went to orthopedist and Neurology and had an EMG that was normal she was placed on medications include gabapentin which she takes but it does make her drowsy so she usually only takes it at bedtime.  She was also taking Lexapro for anxiety she does find that helps but discussed the possibility of fibromyalgia with Rheumatology and she wanted to consider switching to Cymbalta.  She currently is having a bit of abdominal discomfort on the right side intermittently no burning with urination .  For memory recheck B12 and treat if indicated continue oral B12 we discussed neurology follow-up she has seen 1 recently for her feet    Follow-up  Associated symptoms include abdominal pain and arthralgias.   Review of Systems   Gastrointestinal:  Positive for abdominal pain.   Musculoskeletal:  Positive for arthralgias.        Foot pain      Objective:    Blood pressure 118/70, pulse 79, height 5' 1" (1.549 m), weight 58.5 kg (128 lb 15.5 oz), SpO2 99 %.   Physical Exam  Constitutional:       General: She is not in acute distress.  HENT:      Head: Normocephalic.      Mouth/Throat:      Pharynx: Oropharynx is clear.   Eyes:      General: No scleral icterus.  Cardiovascular:      Rate and Rhythm: Normal rate and regular rhythm.      Heart sounds: Normal heart sounds. No murmur heard.    No friction rub. No gallop.   Pulmonary:      Effort: Pulmonary effort is normal. No respiratory distress.      Breath sounds: Normal breath sounds.   Abdominal:      General: Bowel sounds are normal.      Palpations: Abdomen is soft. " There is no mass.      Tenderness: There is no abdominal tenderness.   Musculoskeletal:      Cervical back: Neck supple.      Comments: Bruise hematoma right ankle    Skin:     Findings: No erythema.   Neurological:      Mental Status: She is alert.   Psychiatric:         Mood and Affect: Mood normal.       Assessment:       1. Foot pain, bilateral    2. Anxiety    3. B12 deficiency    4. Flank pain    5. Abdominal pain, unspecified abdominal location    6. Hyperlipidemia, unspecified hyperlipidemia type    7. Tobacco use        Plan:       Carmen was seen today for follow-up.    Diagnoses and all orders for this visit:    Foot pain, bilateral    Anxiety  She will stop her Lexapro and trial of Cymbalta she uses lorazepam on occasion    B12 deficiency  Continue oral B12 and see if she is in need of additional B12  -     Vitamin B12; Future    Flank pain  -     Urinalysis; Future    Abdominal pain, unspecified abdominal location  Proceed with additional testing  -     CT Abdomen Pelvis With Contrast; Future  -     Basic Metabolic Panel; Future    Hyperlipidemia, unspecified hyperlipidemia type  Discussed cholesterol medication she declines  -     X-Ray Chest PA And Lateral; Future    Tobacco use  Smoking cessation encouraged  -     X-Ray Chest PA And Lateral; Future    For her foot pain she is seen multiple specialist conservative measures trial of  -     DULoxetine (CYMBALTA) 20 MG capsule; Take 1 capsule (20 mg total) by mouth once daily.      -     LORazepam (ATIVAN) 0.5 MG tablet; TAKE 1 TABLET(0.5 MG) BY MOUTH EVERY NIGHT AS NEEDED FOR ANXIETY/ insomnia    Follow-up due 3 months with medication adjustment

## 2023-06-14 ENCOUNTER — HOSPITAL ENCOUNTER (OUTPATIENT)
Dept: RADIOLOGY | Facility: HOSPITAL | Age: 63
Discharge: HOME | End: 2023-06-14
Attending: INTERNAL MEDICINE
Payer: COMMERCIAL

## 2023-06-14 DIAGNOSIS — R10.9 ABDOMINAL PAIN, UNSPECIFIED ABDOMINAL LOCATION: ICD-10-CM

## 2023-06-14 PROCEDURE — 74177 CT ABD & PELVIS W/CONTRAST: CPT | Mod: 26,,, | Performed by: INTERNAL MEDICINE

## 2023-06-14 PROCEDURE — A9698 NON-RAD CONTRAST MATERIALNOC: HCPCS | Performed by: INTERNAL MEDICINE

## 2023-06-14 PROCEDURE — 74177 CT ABD & PELVIS W/CONTRAST: CPT | Mod: TC

## 2023-06-14 PROCEDURE — 74177 CT ABDOMEN PELVIS WITH CONTRAST: ICD-10-PCS | Mod: 26,,, | Performed by: INTERNAL MEDICINE

## 2023-06-14 PROCEDURE — 25500020 PHARM REV CODE 255: Performed by: INTERNAL MEDICINE

## 2023-06-14 RX ADMIN — Medication 450 ML: at 02:06

## 2023-06-14 RX ADMIN — IOHEXOL 75 ML: 350 INJECTION, SOLUTION INTRAVENOUS at 02:06

## 2023-06-16 DIAGNOSIS — K57.90 DIVERTICULAR DISEASE: ICD-10-CM

## 2023-06-16 RX ORDER — DULOXETIN HYDROCHLORIDE 30 MG/1
30 CAPSULE, DELAYED RELEASE ORAL DAILY
Qty: 30 CAPSULE | Refills: 11 | Status: SHIPPED | OUTPATIENT
Start: 2023-06-16 | End: 2023-07-05 | Stop reason: ALTCHOICE

## 2023-07-05 ENCOUNTER — PATIENT MESSAGE (OUTPATIENT)
Dept: INTERNAL MEDICINE | Facility: CLINIC | Age: 63
End: 2023-07-05
Payer: COMMERCIAL

## 2023-07-05 DIAGNOSIS — G57.53 TARSAL TUNNEL SYNDROME OF BOTH LOWER EXTREMITIES: ICD-10-CM

## 2023-07-05 DIAGNOSIS — M72.2 PLANTAR FASCIITIS, BILATERAL: ICD-10-CM

## 2023-07-05 RX ORDER — GABAPENTIN 300 MG/1
300 CAPSULE ORAL 3 TIMES DAILY
Qty: 90 CAPSULE | Refills: 11 | Status: SHIPPED | OUTPATIENT
Start: 2023-07-05 | End: 2024-07-04

## 2023-07-05 RX ORDER — ESCITALOPRAM OXALATE 10 MG/1
10 TABLET ORAL DAILY
Qty: 90 TABLET | Refills: 3 | Status: SHIPPED | OUTPATIENT
Start: 2023-07-05 | End: 2023-08-16

## 2023-07-06 ENCOUNTER — PATIENT MESSAGE (OUTPATIENT)
Dept: INTERNAL MEDICINE | Facility: CLINIC | Age: 63
End: 2023-07-06
Payer: COMMERCIAL

## 2023-07-14 ENCOUNTER — RESEARCH ENCOUNTER (OUTPATIENT)
Dept: RESEARCH | Facility: OTHER | Age: 63
End: 2023-07-14
Payer: COMMERCIAL

## 2023-07-14 NOTE — RESEARCH
Sponsor: [email or web address]     Study Title/IRB Number: [id number]    Principle Investigator: Dr. Trev Braun    Patient eligibility was checked prior to enrollment in the study. Patient met the following inclusion and exclusion criteria:     INCLUSION CRITERIA  Participant age is 50 years or older at the time of signing the Informed Consent form  Participant is capable of giving signed Informed Consent, which includes compliance with the requirements and restrictions in the informed consent form and the protocol    EXCLUSION CRITERIA  Participant is undergoing or referred for diagnostic evaluation due to clinical suspicion for cancer  Participant has a personal history of invasive or hematologic malignancy, diagnosed within the last 3 years prior to expected enrollment date or diagnosed greater than 3 years prior to expected enrollment date and never treated  Participant has had definitive treatment for invasive or hematologic malignancy within the 3 years prior to expected enrollment date  Participant is not able to comply with protocol procedures  Participant is not a current patient at a participating center  Participant is currently enrolled or was previously enrolled in another [email or web address]-sponsored study  Participant is current or previous employee/contractor of [email or web address]  Participant is currently pregnant (by participant's self-report of pregnancy status)    Prior to the Informed Consent (IC) being signed, or any study protocol required data collection, testing, procedure, or intervention being performed, the following was done and/or discussed:  Patient was given a copy of the IC for review   Purpose of the study and qualifications to participate   Study design, Follow up schedule, and tests or procedures done at each visit  Confidentiality and HIPAA Authorization for Release of Medical Records for the research trial/ subject's rights/research related injury  Risk, Benefits, Alternative Treatments, Compensation and  "Costs  Participation in the research trial is voluntary and patient may withdraw at anytime  Contact information for study related questions    Patient verbalizes understanding of the above: Yes  Contact information for CRC and PI given to patient: Yes  Patient able to adequately summarize: the purpose of the study, the risks associated with the study, and all procedures, testing, and follow-ups associated with the study: Yes    Patient signed the informed consent form for the research study with an IRB approval date of 4/25/2022. Each page of the consent form was reviewed with patient and all questions answered satisfactorily.   Patient received a copy of the consent form. The original consent was scanned into electronic medical records.    Anthropometric Data    Participant is a 63 y.o., White, Not  or /a, female  Participant height: 5'1"  Participant weight: 125 lbs      Smoking History and Alcohol use     Please indicate whether the participant smoked at least 100 cigarettes in their lifetime:   Yes  Please indicate whether the participant is a current smoker:  Yes  Age participant started smoking cigarettes:  14 years old  Age participant stopped smoking cigarettes: Not Applicable  During their time as a smoker, please indicate if the participant stopped smoking for a month or more: Not Applicable  Please indicate how many cigarettes per day did the participant smoke on average for the majority of their time as a smoker: Blank single: 10 cigarettes per day    During the last 12 months, how often did the participant have any kind of drink containing alcohol? Count as one drink a 12 ounce can or glass of beer, a 5 ounce glass of wine, or a drink containing 1 shot of liquor. Less than once a week}  During the last 12 months, how many drinks did the participant have on days when they drank alcohol? 3 drinks per day    Blood Draw    Following IC being signed and prior to blood draw, patient completed " all baseline/pretest questionnaires. The following specimens were collected from the pt at the time of this encounter via peripheral blood draw.    Blood draw location: Left Arm  Needle used: 23 gauge butterfly needle  Blood draw amount: 40ml  Blood draw time: 11:45AM 7/14/2023

## 2023-07-28 ENCOUNTER — RESEARCH ENCOUNTER (OUTPATIENT)
Dept: RESEARCH | Facility: OTHER | Age: 63
End: 2023-07-28
Payer: COMMERCIAL

## 2023-07-28 NOTE — PROGRESS NOTES
David Pathfinder 2022.003    David ID: [id number]     Results the [facility] Cancer Early Detection test and were reviewed by PI Dr Braun. Patient was called and notified of test results, there was no signal detected.    Patient reminded, this was a screening test, so we still highly encourage them to continue other normal health screenings  and to continue to adhere to guideline-recommended cancer screening.    Patient was asked about completing 30 day questionnaire online and was agreeable.

## 2023-08-16 ENCOUNTER — OFFICE VISIT (OUTPATIENT)
Dept: OBSTETRICS AND GYNECOLOGY | Facility: CLINIC | Age: 63
End: 2023-08-16
Payer: COMMERCIAL

## 2023-08-16 VITALS — BODY MASS INDEX: 23.73 KG/M2 | HEIGHT: 61 IN | WEIGHT: 125.69 LBS

## 2023-08-16 DIAGNOSIS — Z01.419 WELL WOMAN EXAM WITH ROUTINE GYNECOLOGICAL EXAM: Primary | ICD-10-CM

## 2023-08-16 DIAGNOSIS — Z12.31 BREAST CANCER SCREENING BY MAMMOGRAM: ICD-10-CM

## 2023-08-16 DIAGNOSIS — N95.2 VAGINAL ATROPHY: ICD-10-CM

## 2023-08-16 PROCEDURE — 3008F BODY MASS INDEX DOCD: CPT | Mod: CPTII,S$GLB,, | Performed by: STUDENT IN AN ORGANIZED HEALTH CARE EDUCATION/TRAINING PROGRAM

## 2023-08-16 PROCEDURE — 1159F PR MEDICATION LIST DOCUMENTED IN MEDICAL RECORD: ICD-10-PCS | Mod: CPTII,S$GLB,, | Performed by: STUDENT IN AN ORGANIZED HEALTH CARE EDUCATION/TRAINING PROGRAM

## 2023-08-16 PROCEDURE — 99396 PREV VISIT EST AGE 40-64: CPT | Mod: S$GLB,,, | Performed by: STUDENT IN AN ORGANIZED HEALTH CARE EDUCATION/TRAINING PROGRAM

## 2023-08-16 PROCEDURE — 3008F PR BODY MASS INDEX (BMI) DOCUMENTED: ICD-10-PCS | Mod: CPTII,S$GLB,, | Performed by: STUDENT IN AN ORGANIZED HEALTH CARE EDUCATION/TRAINING PROGRAM

## 2023-08-16 PROCEDURE — 1159F MED LIST DOCD IN RCRD: CPT | Mod: CPTII,S$GLB,, | Performed by: STUDENT IN AN ORGANIZED HEALTH CARE EDUCATION/TRAINING PROGRAM

## 2023-08-16 PROCEDURE — 99396 PR PREVENTIVE VISIT,EST,40-64: ICD-10-PCS | Mod: S$GLB,,, | Performed by: STUDENT IN AN ORGANIZED HEALTH CARE EDUCATION/TRAINING PROGRAM

## 2023-08-16 RX ORDER — DULOXETIN HYDROCHLORIDE 30 MG/1
30 CAPSULE, DELAYED RELEASE ORAL
COMMUNITY
Start: 2023-07-17 | End: 2023-10-02 | Stop reason: SDUPTHER

## 2023-08-16 NOTE — PROGRESS NOTES
History & Physical  Gynecology      SUBJECTIVE:     Chief Complaint: No chief complaint on file.       History of Present Illness:  Carmen presents for annual exam. Some vaginal dryness  Menstrual History: menopausal since age 44. Not on HRT. No bleeding  Obstetric Hx: 1 CS  Sexually Active: one male partner;  with pancreatic  Family history: below  Social: Wears seatbelts. Does exercise. Feels safe at home.  Last pap:  normal  Last mammo: Mammogram utd; MRI every 6 month for history of ductal hyperplasia.   Colonoscopy: utd  DEXA: 2019  Covid vaccine utd  Vitamins: taking      Review of patient's allergies indicates:   Allergen Reactions    Ciprofloxacin Hives    Sulfa (sulfonamide antibiotics) Other (See Comments)     Cp        Past Medical History:   Diagnosis Date    Abnormal Pap smear     Allergy     Endometriosis, moderate     GERD (gastroesophageal reflux disease)     Heart palpitations      Past Surgical History:   Procedure Laterality Date    BILE DUCT EXPLORATION      BREAST BIOPSY Left     Touro     SECTION, CLASSIC      CHOLECYSTECTOMY      ENDOMETRIAL ABLATION      hx benign breast biopsy      leep      TONSILLECTOMY      TUBAL LIGATION       OB History          1    Para   1    Term   1            AB        Living   1         SAB        IAB        Ectopic        Multiple        Live Births   1               Family History   Problem Relation Age of Onset    Diabetes Mother     Cancer Brother         gallbladder ca    Alcohol abuse Father     Breast cancer Paternal Grandmother     Cancer Paternal Grandmother         breast    Cervical cancer Maternal Grandmother     Cancer Maternal Grandmother         cervical    Ovarian cancer Maternal Grandmother     Heart disease Maternal Uncle     Kidney disease Sister         nephrotic syndrome    No Known Problems Son     No Known Problems Sister     Colon cancer Neg Hx      Social History     Tobacco Use    Smoking status:  Former     Current packs/day: 0.00     Types: Cigarettes     Quit date: 2021     Years since quittin.3    Smokeless tobacco: Never    Tobacco comments:     smoker since age 14, used to smoke 1 ppd   Substance Use Topics    Alcohol use: Yes     Alcohol/week: 4.0 standard drinks of alcohol     Types: 4 Cans of beer per week    Drug use: No       Current Outpatient Medications   Medication Sig    cyanocobalamin, vitamin B-12, 1,000 mcg TbSR Take 1 tablet by mouth once daily.    ergocalciferol (ERGOCALCIFEROL) 50,000 unit Cap Take 1 capsule (50,000 Units total) by mouth every 7 days.    EScitalopram oxalate (LEXAPRO) 10 MG tablet Take 1 tablet (10 mg total) by mouth once daily.    gabapentin (NEURONTIN) 300 MG capsule Take 1 capsule (300 mg total) by mouth 3 (three) times daily.    LORazepam (ATIVAN) 0.5 MG tablet TAKE 1 TABLET(0.5 MG) BY MOUTH EVERY NIGHT AS NEEDED FOR ANXIETY/ insomnia     No current facility-administered medications for this visit.         Review of Systems:  Review of Systems   Constitutional:  Negative for activity change, appetite change, fever and unexpected weight change.   Respiratory:  Negative for cough and shortness of breath.    Cardiovascular:  Negative for chest pain.   Gastrointestinal:  Positive for bloating and constipation. Negative for abdominal pain, blood in stool, diarrhea, nausea and vomiting.        IBS many years ago. Feels it may be creeping up again. Bloating, constipation   Endocrine: Negative for hot flashes.   Genitourinary:  Positive for vaginal dryness. Negative for dysuria, frequency, hematuria, hot flashes, pelvic pain, urgency, vaginal bleeding, vaginal discharge, vaginal pain, urinary incontinence and postmenopausal bleeding.   Integumentary:  Negative for breast mass.   Breast: Negative for lump and mass       OBJECTIVE:     Physical Exam:  Physical Exam  Vitals reviewed.   Constitutional:       General: She is not in acute distress.     Appearance: She is  well-developed. She is not diaphoretic.   HENT:      Head: Normocephalic and atraumatic.   Eyes:      General: No scleral icterus.        Right eye: No discharge.         Left eye: No discharge.      Conjunctiva/sclera: Conjunctivae normal.   Neck:      Thyroid: No thyromegaly.   Cardiovascular:      Rate and Rhythm: Normal rate.   Pulmonary:      Effort: Pulmonary effort is normal.   Chest:   Breasts:     Breasts are symmetrical.      Right: No inverted nipple, mass, nipple discharge, skin change or tenderness.      Left: No inverted nipple, mass, nipple discharge, skin change or tenderness.   Abdominal:      General: There is no distension.      Palpations: Abdomen is soft.      Tenderness: There is no abdominal tenderness.   Genitourinary:     Labia:         Right: No rash, tenderness, lesion or injury.         Left: No rash, tenderness, lesion or injury.       Vagina: Normal. No signs of injury and foreign body. No vaginal discharge, erythema, tenderness or bleeding.      Cervix: No cervical motion tenderness, discharge or friability.      Uterus: Not deviated, not enlarged, not fixed and not tender.       Adnexa:         Right: No mass, tenderness or fullness.          Left: No mass, tenderness or fullness.     Musculoskeletal:         General: Normal range of motion.      Cervical back: Normal range of motion and neck supple.   Lymphadenopathy:      Cervical: No cervical adenopathy.   Skin:     General: Skin is warm and dry.      Findings: No erythema or rash.   Neurological:      Mental Status: She is alert and oriented to person, place, and time.           ASSESSMENT:       ICD-10-CM ICD-9-CM    1. Well woman exam with routine gynecological exam  Z01.419 V72.31              Plan:      WWE  - Postmenopausal. Premarin cream sent  - Vaccines utd  - Labs utd  - Mammogram ordered. Five years since ductal hyperplasia. Will stop MRI for now.  - Colonoscopy utd  - Pap due 2025  - DEXA ordered  - Fiber recommended  -  CBE normal. Physical exam normal. VSS  - Will touch base with research and genetics      Counseling time: 15 minutes    Estrellita Alba

## 2023-08-29 ENCOUNTER — HOSPITAL ENCOUNTER (OUTPATIENT)
Dept: RADIOLOGY | Facility: HOSPITAL | Age: 63
Discharge: HOME | End: 2023-08-29
Attending: STUDENT IN AN ORGANIZED HEALTH CARE EDUCATION/TRAINING PROGRAM
Payer: COMMERCIAL

## 2023-08-29 VITALS — HEIGHT: 61 IN | WEIGHT: 125 LBS | BODY MASS INDEX: 23.6 KG/M2

## 2023-08-29 DIAGNOSIS — Z01.419 WELL WOMAN EXAM WITH ROUTINE GYNECOLOGICAL EXAM: ICD-10-CM

## 2023-08-29 DIAGNOSIS — Z12.31 BREAST CANCER SCREENING BY MAMMOGRAM: ICD-10-CM

## 2023-08-29 PROCEDURE — 77063 MAMMO DIGITAL SCREENING BILAT WITH TOMO: ICD-10-PCS | Mod: 26,,, | Performed by: RADIOLOGY

## 2023-08-29 PROCEDURE — 77067 MAMMO DIGITAL SCREENING BILAT WITH TOMO: ICD-10-PCS | Mod: 26,,, | Performed by: RADIOLOGY

## 2023-08-29 PROCEDURE — 77067 SCR MAMMO BI INCL CAD: CPT | Mod: TC

## 2023-08-29 PROCEDURE — 77067 SCR MAMMO BI INCL CAD: CPT | Mod: 26,,, | Performed by: RADIOLOGY

## 2023-08-29 PROCEDURE — 77063 BREAST TOMOSYNTHESIS BI: CPT | Mod: 26,,, | Performed by: RADIOLOGY

## 2023-08-29 RX ORDER — LORAZEPAM 0.5 MG/1
TABLET ORAL
Qty: 30 TABLET | Refills: 1 | Status: SHIPPED | OUTPATIENT
Start: 2023-08-29 | End: 2023-10-24 | Stop reason: SDUPTHER

## 2023-08-29 NOTE — TELEPHONE ENCOUNTER
No care due was identified.  [facility] Embedded Care Due Messages. Reference number: [id number].   8/29/2023 1:11:14 PM CDT

## 2023-09-19 ENCOUNTER — HOSPITAL ENCOUNTER (OUTPATIENT)
Dept: RADIOLOGY | Facility: CLINIC | Age: 63
Discharge: HOME | End: 2023-09-19
Attending: STUDENT IN AN ORGANIZED HEALTH CARE EDUCATION/TRAINING PROGRAM
Payer: COMMERCIAL

## 2023-09-19 DIAGNOSIS — Z01.419 WELL WOMAN EXAM WITH ROUTINE GYNECOLOGICAL EXAM: ICD-10-CM

## 2023-09-19 PROCEDURE — 77080 DXA BONE DENSITY AXIAL: CPT | Mod: TC

## 2023-09-19 PROCEDURE — 77080 DXA BONE DENSITY AXIAL SKELETON 1 OR MORE SITES: ICD-10-PCS | Mod: 26,,, | Performed by: INTERNAL MEDICINE

## 2023-09-19 PROCEDURE — 77080 DXA BONE DENSITY AXIAL: CPT | Mod: 26,,, | Performed by: INTERNAL MEDICINE

## 2023-09-22 ENCOUNTER — TELEPHONE (OUTPATIENT)
Dept: OBSTETRICS AND GYNECOLOGY | Facility: CLINIC | Age: 63
End: 2023-09-22
Payer: COMMERCIAL

## 2023-09-22 DIAGNOSIS — M81.0 OSTEOPOROSIS, UNSPECIFIED OSTEOPOROSIS TYPE, UNSPECIFIED PATHOLOGICAL FRACTURE PRESENCE: Primary | ICD-10-CM

## 2023-09-22 NOTE — TELEPHONE ENCOUNTER
----- Message from Estrellita Alba MD sent at 9/22/2023 11:32 AM CDT -----  Bone density referral places

## 2023-09-26 ENCOUNTER — CLINICAL SUPPORT (OUTPATIENT)
Dept: SMOKING CESSATION | Facility: CLINIC | Age: 63
End: 2023-09-26
Payer: COMMERCIAL

## 2023-09-26 DIAGNOSIS — F17.200 NICOTINE DEPENDENCE: Primary | ICD-10-CM

## 2023-09-26 PROCEDURE — 99999 PR PBB SHADOW E&M-EST. PATIENT-LVL II: CPT | Mod: PBBFAC,,,

## 2023-09-26 PROCEDURE — 99999 PR PBB SHADOW E&M-EST. PATIENT-LVL II: ICD-10-PCS | Mod: PBBFAC,,,

## 2023-09-26 PROCEDURE — 99404 PREV MED CNSL INDIV APPRX 60: CPT | Mod: S$GLB,,,

## 2023-09-26 PROCEDURE — 99404 PR PREVENT COUNSEL,INDIV,60 MIN: ICD-10-PCS | Mod: S$GLB,,,

## 2023-09-27 ENCOUNTER — OFFICE VISIT (OUTPATIENT)
Dept: OBSTETRICS AND GYNECOLOGY | Facility: CLINIC | Age: 63
End: 2023-09-27
Payer: COMMERCIAL

## 2023-09-27 DIAGNOSIS — M81.0 OSTEOPOROSIS, UNSPECIFIED OSTEOPOROSIS TYPE, UNSPECIFIED PATHOLOGICAL FRACTURE PRESENCE: Primary | ICD-10-CM

## 2023-09-27 PROCEDURE — 1159F MED LIST DOCD IN RCRD: CPT | Mod: CPTII,95,, | Performed by: NURSE PRACTITIONER

## 2023-09-27 PROCEDURE — 99213 OFFICE O/P EST LOW 20 MIN: CPT | Mod: 95,,, | Performed by: NURSE PRACTITIONER

## 2023-09-27 PROCEDURE — 1160F RVW MEDS BY RX/DR IN RCRD: CPT | Mod: CPTII,95,, | Performed by: NURSE PRACTITIONER

## 2023-09-27 PROCEDURE — 1160F PR REVIEW ALL MEDS BY PRESCRIBER/CLIN PHARMACIST DOCUMENTED: ICD-10-PCS | Mod: CPTII,95,, | Performed by: NURSE PRACTITIONER

## 2023-09-27 PROCEDURE — 1159F PR MEDICATION LIST DOCUMENTED IN MEDICAL RECORD: ICD-10-PCS | Mod: CPTII,95,, | Performed by: NURSE PRACTITIONER

## 2023-09-27 PROCEDURE — 99213 PR OFFICE/OUTPT VISIT, EST, LEVL III, 20-29 MIN: ICD-10-PCS | Mod: 95,,, | Performed by: NURSE PRACTITIONER

## 2023-09-27 RX ORDER — VARENICLINE TARTRATE 1 MG/1
TABLET, FILM COATED ORAL
Qty: 56 TABLET | Refills: 0 | Status: SHIPPED | OUTPATIENT
Start: 2023-09-27 | End: 2023-11-08 | Stop reason: SDUPTHER

## 2023-09-27 NOTE — PROGRESS NOTES
Patient seen in clinic for Quit # 3. Patient reports smoking  1 pack of cigarettes a day. FTND score of  7 indicates a  high level of nicotine dependence, KATHIA-D score of 31 perceived as significant degree of mental distress /probable depression. Patient will begin the prescribed tobacco cessation medication regimen of  0.5-1 mg Chantix. Patient has taken in the past and able to quit. She cant wear patch dure to allergic reaction. Discussed and reviewed with the patient regarding NRT's and medication along with behavioral therapy & counseling to assist patient with meeting her goal of being smoke free. Patient is agreeable to participate in bi-weekly sessions as well as group therapy when it is available. Patient educated on role & usage possible side effects. Patient provided with smoking diary to log her daily cigarette usage. Reviewed behavioral modification strategy of rate reduction and wait time of 15 min prior to smoking. Patient verbalized understanding & willingness to apply. Patient instructed to call TTS with any questions or concerns. Current CO measurement =  30 ppm (0-6 ppm is a non-smoker). Patient here with  who is also back to smoking and wanting to quit for health reasons.

## 2023-10-02 ENCOUNTER — OFFICE VISIT (OUTPATIENT)
Dept: ORTHOPEDICS | Facility: CLINIC | Age: 63
End: 2023-10-02
Payer: COMMERCIAL

## 2023-10-02 VITALS — HEIGHT: 61 IN | BODY MASS INDEX: 23.6 KG/M2 | WEIGHT: 125 LBS

## 2023-10-02 DIAGNOSIS — G57.53 TARSAL TUNNEL SYNDROME OF BOTH LOWER EXTREMITIES: Primary | ICD-10-CM

## 2023-10-02 DIAGNOSIS — M72.2 PLANTAR FASCIITIS, BILATERAL: ICD-10-CM

## 2023-10-02 PROCEDURE — 1159F PR MEDICATION LIST DOCUMENTED IN MEDICAL RECORD: ICD-10-PCS | Mod: CPTII,S$GLB,, | Performed by: ORTHOPAEDIC SURGERY

## 2023-10-02 PROCEDURE — 3008F PR BODY MASS INDEX (BMI) DOCUMENTED: ICD-10-PCS | Mod: CPTII,S$GLB,, | Performed by: ORTHOPAEDIC SURGERY

## 2023-10-02 PROCEDURE — 3008F BODY MASS INDEX DOCD: CPT | Mod: CPTII,S$GLB,, | Performed by: ORTHOPAEDIC SURGERY

## 2023-10-02 PROCEDURE — 99999 PR PBB SHADOW E&M-EST. PATIENT-LVL III: CPT | Mod: PBBFAC,,, | Performed by: ORTHOPAEDIC SURGERY

## 2023-10-02 PROCEDURE — 20605 PR DRAIN/INJECT INTERMEDIATE JOINT/BURSA: ICD-10-PCS | Mod: LT,S$GLB,, | Performed by: ORTHOPAEDIC SURGERY

## 2023-10-02 PROCEDURE — 1159F MED LIST DOCD IN RCRD: CPT | Mod: CPTII,S$GLB,, | Performed by: ORTHOPAEDIC SURGERY

## 2023-10-02 PROCEDURE — 20605 DRAIN/INJ JOINT/BURSA W/O US: CPT | Mod: LT,S$GLB,, | Performed by: ORTHOPAEDIC SURGERY

## 2023-10-02 PROCEDURE — 99999 PR PBB SHADOW E&M-EST. PATIENT-LVL III: ICD-10-PCS | Mod: PBBFAC,,, | Performed by: ORTHOPAEDIC SURGERY

## 2023-10-02 PROCEDURE — 99213 OFFICE O/P EST LOW 20 MIN: CPT | Mod: 25,S$GLB,, | Performed by: ORTHOPAEDIC SURGERY

## 2023-10-02 PROCEDURE — 99213 PR OFFICE/OUTPT VISIT, EST, LEVL III, 20-29 MIN: ICD-10-PCS | Mod: 25,S$GLB,, | Performed by: ORTHOPAEDIC SURGERY

## 2023-10-02 RX ORDER — METHYLPREDNISOLONE ACETATE 40 MG/ML
40 INJECTION, SUSPENSION INTRA-ARTICULAR; INTRALESIONAL; INTRAMUSCULAR; SOFT TISSUE
Status: COMPLETED | OUTPATIENT
Start: 2023-10-02 | End: 2023-10-02

## 2023-10-02 RX ORDER — DULOXETIN HYDROCHLORIDE 30 MG/1
60 CAPSULE, DELAYED RELEASE ORAL DAILY
Qty: 30 CAPSULE | Refills: 0 | Status: SHIPPED | OUTPATIENT
Start: 2023-10-02 | End: 2023-10-24 | Stop reason: SDUPTHER

## 2023-10-02 RX ADMIN — METHYLPREDNISOLONE ACETATE 40 MG: 40 INJECTION, SUSPENSION INTRA-ARTICULAR; INTRALESIONAL; INTRAMUSCULAR; SOFT TISSUE at 02:10

## 2023-10-02 NOTE — PROGRESS NOTES
Carmen Manriquez  Returns today for follow-up.  This is a 63-year-old female who has a couple year history of bilateral plantar foot pain and burning and presented to me initially on 04/19/2023.  She had been seen previously by Podiatry and was diagnosed with bilateral plantar fasciitis and tarsal tunnel syndrome.  Conservative measures were not helping with her pain.  She had MRI of both feet as well as EMG nerve conduction studies which have not revealed any obvious abnormalities.  I prescribed gabapentin to her previously and she continues to take the gabapentin which she states helps with some of her burning pain.  She did see Rheumatology since I spoke to her about her MRIs and was also placed on Cymbalta.  She states that the rheumatologist feel she may have fibromyalgia.  We would discussed injecting one of both heels to see if this would give her any relief.  She returns today and would like to get an injection in her left foot which is more symptomatic.    Examination: She walks in today with a normal gait.  On standing inspection she has plantigrade alignment of her feet.  On sitting exam she has full motion of her ankle and subtalar joints bilaterally.  She has tenderness over the origin of the plantar fascia on both heels, worse on the left than on the right.  She does not have any significant tenderness over the abductor hallucis muscles.  She has good function and strength of all the tendons about her ankle.    Impression:  1. Tarsal tunnel syndrome of both lower extremities  DULoxetine (CYMBALTA) 30 MG capsule      2. Plantar fasciitis, bilateral  DULoxetine (CYMBALTA) 30 MG capsule    methylPREDNISolone acetate injection 40 mg        Recommendation:  After verbal consent and sterile prep I injected the origin of the left plantar fascia with 2 cc of lidocaine and 40 mg of methylprednisolone.  She is going to continue taking the gabapentin as well as his Cymbalta.  If she gets good short-term relief from  the injection she may be a candidate for a partial plantar fasciotomy and a distal tarsal tunnel release.  I explained to her I could not make any promises about the results of surgery.

## 2023-10-05 NOTE — PROGRESS NOTES
No chief complaint on file.      History of Present Illness: Carmen Manriquez is a 63 y.o. female that presents today 10/5/2023 via virtual visit. Recent DEXA scan in 2023 revealed osteoporosis of her lumbar spine and osteopenia of the femur. Denies use of long term steroids,anticonvulsants or chemotherapy. She is a smoker, daily coffee drinker.    No history of personal/familial idiopathic or non-traumatic fracture. She is currently taking Vitamin D. She does exercise.       Bone Density Results:   Results for orders placed during the hospital encounter of 09/19/23    DXA Bone Density Axial Skeleton 1 or more sites    Narrative  EXAMINATION:  DXA BONE DENSITY AXIAL SKELETON 1 OR MORE SITES    CLINICAL HISTORY:  Encounter for gynecological examination (general) (routine) without abnormal findings.  62 y/o female with no history of fractures.  She had menopausal symptoms at 45 y/o.  She is taking 50,000 units of Vit D supplements once a week.  She exercise regularly.  Pt smokes.    TECHNIQUE:  DXA specification: [place] [employer] Horizon A ([id number])    Bone Mineral Density scanning was performed over the hip and lumbar spine.    Review of the images confirms satisfactory positioning and technique.    COMPARISON:  Comparisons 10/23/2019 [facility] [place]    FINDINGS:  Lumbar spine (L1-L4):               BMD is 0.770 g/cm2, T-score is -2.5, and Z-score is -0.9.    Total hip:                                BMD is 0.684 g/cm2, T-score is -2.1, and Z-score is -1.0.    Femoral neck:                          BMD is 0.632 g/cm2, T-score is -2.0, and Z-score is -0.5.    Distal 1/3 radius:                      Not applicable    FRAX:    10% risk of a major osteoporotic fracture in the next 10 years.    2.3% risk of hip fracture in the next 10 years.    Impression  *Osteoporosis based on T-score of -2.5.  *Fracture risk is high.  *Cannot compare to prior study.    RECOMMENDATIONS:  *Daily calcium intake 2442-7919 mg, dietary sources  preferred; Vitamin D 8759-0059 IU daily.  *Weight bearing exercise and fall precautions.  *If patient smokes would recommend cessation.  *Recommend medical therapy for osteoporosis with high risk for fracture. Consider bisphosphonates (alendronate, risedronate, zoledronic acid), or denosumab.  *Repeat BMD in 2 years.    EXPLANATION OF RESULTS:  T-score compares these results to the average bone density of a 20-29 year-old of the same gender.    Z-score compares this result to the average bone density to people of the same age, gender, and race.    The amounts indicate the number of standard deviations above or below the mean.    * Osteoporosis is generally defined as having a T-score between less than or equal to -2.5.    * Low bone mass (osteopenia) is generally defined as having a T-score between -1.0 and -2.5.    * The normal range is generally defined as having a T-score greater than or equal to -1.0.    * Calculated FRAX scores for fracture risk prediction may not be accurate in the setting of certain clinical factors such as pharmacologic therapy for osteoporosis, prior fragility fractures, high dose glucocorticoid use.      Electronically signed by: Marely Clarke MD  Date:    2023  Time:    10:17      LABS:  Last Calcium   Lab Results   Component Value Date    CALCIUM 9.6 2023       Last Vitamin D   Lab Results   Component Value Date    [id number] 29 (L) 2022             Past Medical History:   Diagnosis Date    Abnormal Pap smear     Allergy     Endometriosis, moderate     GERD (gastroesophageal reflux disease)     Heart palpitations        Past Surgical History:   Procedure Laterality Date    BILE DUCT EXPLORATION      BREAST BIOPSY Left     Touro     SECTION, CLASSIC      CHOLECYSTECTOMY      ENDOMETRIAL ABLATION      hx benign breast biopsy      leep      TONSILLECTOMY      TUBAL LIGATION         Current Outpatient Medications   Medication Sig Dispense Refill     conjugated estrogens (PREMARIN) vaginal cream Apply pea-sized amount nightly for 2 weeks then twice a week there after 30 g 4    cyanocobalamin, vitamin B-12, 1,000 mcg TbSR Take 1 tablet by mouth once daily. 90 each 0    DULoxetine (CYMBALTA) 30 MG capsule Take 2 capsules (60 mg total) by mouth once daily. 30 capsule 0    ergocalciferol (ERGOCALCIFEROL) 50,000 unit Cap Take 1 capsule (50,000 Units total) by mouth every 7 days. 12 capsule 4    gabapentin (NEURONTIN) 300 MG capsule Take 1 capsule (300 mg total) by mouth 3 (three) times daily. 90 capsule 11    LORazepam (ATIVAN) 0.5 MG tablet TAKE 1 TABLET(0.5 MG) BY MOUTH EVERY NIGHT AS NEEDED FOR ANXIETY/ insomnia 30 tablet 1    varenicline (CHANTIX) 1 mg Tab Take one 1/2 tab (0.5mg) by mouth once daily for 3 days, then increase to one 1/2 tab (0.5mg) twice daily for 4 days, then increase to one tablet (1mg) twice daily. Mail to patient address verified 56 tablet 0     No current facility-administered medications for this visit.       Review of patient's allergies indicates:   Allergen Reactions    Ciprofloxacin Hives    Sulfa (sulfonamide antibiotics) Other (See Comments)     Cp        Family History   Problem Relation Age of Onset    Diabetes Mother     Cancer Brother         gallbladder ca    Alcohol abuse Father     Breast cancer Paternal Grandmother     Cancer Paternal Grandmother         breast    Cervical cancer Maternal Grandmother     Cancer Maternal Grandmother         cervical    Ovarian cancer Maternal Grandmother     Heart disease Maternal Uncle     Kidney disease Sister         nephrotic syndrome    No Known Problems Son     No Known Problems Sister     Colon cancer Neg Hx        Social History     Tobacco Use    Smoking status: Every Day     Current packs/day: 1.00     Average packs/day: 1 pack/day for 48.9 years (48.9 ttl pk-yrs)     Types: Cigarettes     Start date: 1974     Last attempt to quit: 4/12/2021    Smokeless tobacco: Never    Tobacco  comments:     smoker since age 14, used to smoke 1 ppd   Substance Use Topics    Alcohol use: Yes     Alcohol/week: 4.0 standard drinks of alcohol     Types: 4 Cans of beer per week    Drug use: No       OB History    Para Term  AB Living   1 1 1     1   SAB IAB Ectopic Multiple Live Births           1      # Outcome Date GA Lbr Alexandro/2nd Weight Sex Delivery Anes PTL Lv   1 Term      CS-LTranv  N DARYA       Review of Symptoms:  GENERAL: Denies weight gain or weight loss. Feeling well overall.   SKIN: Denies rash or lesions.   HEAD: Denies head injury or headache.   NODES: Denies enlarged lymph nodes.   CHEST: Denies chest pain or shortness of breath.   CARDIOVASCULAR: Denies palpitations or left sided chest pain.   ABDOMEN: No abdominal pain, constipation, diarrhea, nausea, vomiting or rectal bleeding.   URINARY: No frequency, dysuria, hematuria, or burning on urination.  HEMATOLOGIC: No easy bruisability or excessive bleeding.   MUSCULOSKELETAL: Denies joint pain or swelling.     There were no vitals taken for this visit.  Physical Exam:  APPEARANCE: Well nourished, well developed, in no acute distress.  SKIN: Normal skin turgor, no lesions.  NECK: Neck symmetric without masses   RESPIRATORY: Normal respiratory effort with no retractions or use of accessory muscles  CARDIOVASCULAR: Peripheral vascular system with no swelling no varicosities and palpation of pulses normal  LYMPHATIC: No enlargements of the lymph nodes noted in the neck, axillae, or groin  ABDOMEN: Soft. No tenderness or masses. No hepatosplenomegaly. No hernias.  BREASTS: Symmetrical, no skin changes or visible lesions. No palpable masses, nipple discharge or adenopathy bilaterally.  PELVIC: Normal external female genitalia without lesions. Normal hair distribution. Adequate perineal body, normal urethral meatus. Urethra with no masses.  Bladder nontender. Vagina moist and well rugated without lesions or discharge. Cervix pink and without  lesions. No significant cystocele or rectocele. Bimanual exam showed uterus normal size, shape, position, mobile and nontender. Adnexa without masses or tenderness. Urethra and bladder normal.  EXTREMITIES: No clubbing cyanosis or edema.    ASSESSMENT/PLAN:  Osteoporosis, unspecified osteoporosis type, unspecified pathological fracture presence           Risk factors in bold   Race: White  Female   Postmenopausal   History for fractures? Parental fractures or osteoporosis?    Exercise?  Steroids? Anticonvulsants? Chemo?   Smoker ?   Alochol/ Caffeine intake?   Diet/supplements?         Recommendations  Limit caffeine and alcohol intake   Nutrition/foods high in calcium and vit D   Calcium (1200mg/d) and Vit D (600-800 IU/d) supplements.   Adequate protein (to reduce potential for fractures and promote fracture healing in older adults)  No smoking/avoid second hand smoke   Weight bearing, resistance exercise and aerobics - spine  Walking - hip       Recommendation for use of Fosamax for treatment, desires to research and plans to notify if desires to proceed with treatment.    FU with NP as needed.       WOLF Strong    The patient location is: [place]  The chief complaint leading to consultation is: Bone Density    Visit type: audiovisual and audio    Face to Face time with patient: 5 minutes  20 minutes of total time spent on the encounter, which includes face to face time and non-face to face time preparing to see the patient (eg, review of tests), Obtaining and/or reviewing separately obtained history, Documenting clinical information in the electronic or other health record, Independently interpreting results (not separately reported) and communicating results to the patient/family/caregiver, or Care coordination (not separately reported).     Each patient to whom he or she provides medical services by telemedicine is:  (1) informed of the relationship between the physician and patient and the  respective role of any other health care provider with respect to management of the patient; and (2) notified that he or she may decline to receive medical services by telemedicine and may withdraw from such care at any time.

## 2023-10-10 ENCOUNTER — CLINICAL SUPPORT (OUTPATIENT)
Dept: SMOKING CESSATION | Facility: CLINIC | Age: 63
End: 2023-10-10
Payer: COMMERCIAL

## 2023-10-10 DIAGNOSIS — F17.200 NICOTINE DEPENDENCE: Primary | ICD-10-CM

## 2023-10-10 PROCEDURE — 90853 GROUP PSYCHOTHERAPY: CPT | Mod: ,,,

## 2023-10-10 PROCEDURE — 90853 PR GROUP PSYCHOTHERAPY: ICD-10-PCS | Mod: ,,,

## 2023-10-10 NOTE — PROGRESS NOTES
Site:[facility]  Date:  10/10/2023  Clinical Status of Patient: Outpatient   Length of Service and Code: 60 minutes - 52437   Number in Attendance: 3  CO Monitor Score: NA pmm  Group Activities/Focus of Group:  orientation, client introductions, completion of TCRS (Tobacco Cessation Rating Scale) learned addiction model, cues/triggers, personal reasons for quitting, medications, goals, quit date    Target symptoms:  withdrawal and medication side effects             The following were rated moderate (3) to severe (4) on TCRS:       Moderate 3: urge     Severe 4:   none  Patient's Response to Intervention: Spoke to patient by phone this evening along with her  on speaker phone. They are both in program and trying to quit together. Patient stated that she has not started Chantix and her quit yet. She has been taking gabapentin and felt that it might interfere and work has been stressful and she just finished up some projects. Reviewed Chantix use and possible side effects with patient. Suggested that she not use excuses and try to start her quit and remember the reason why she wants to quit. Patient has contact information if she encounters any possible side effect while taking Chantix. Review strategies with patient along with the 4 D's delay, distract, do something and drink water when she has urge or craving.   Progress Toward Goals and Other Mental Status Changes: The patient denies any abnormal behavioral or mental changes at this time.       Diagnosis: Z17.200  Plan: The patient will continue with group therapy sessions and medication regimen prescribed with management by physician or Cessation Clinic Provider. Patient will inform Smoking Clinic Cessation Counselor of symptoms as rated high on TCRS.    Return to Clinic: 2 weeks

## 2023-10-24 ENCOUNTER — PATIENT MESSAGE (OUTPATIENT)
Dept: ORTHOPEDICS | Facility: CLINIC | Age: 63
End: 2023-10-24
Payer: COMMERCIAL

## 2023-10-24 ENCOUNTER — CLINICAL SUPPORT (OUTPATIENT)
Dept: SMOKING CESSATION | Facility: CLINIC | Age: 63
End: 2023-10-24
Payer: COMMERCIAL

## 2023-10-24 DIAGNOSIS — F17.200 NICOTINE DEPENDENCE: Primary | ICD-10-CM

## 2023-10-24 PROCEDURE — 99999 PR PBB SHADOW E&M-EST. PATIENT-LVL I: CPT | Mod: PBBFAC,,,

## 2023-10-24 PROCEDURE — 90853 PR GROUP PSYCHOTHERAPY: ICD-10-PCS | Mod: S$GLB,,,

## 2023-10-24 PROCEDURE — 90853 GROUP PSYCHOTHERAPY: CPT | Mod: S$GLB,,,

## 2023-10-24 PROCEDURE — 99999 PR PBB SHADOW E&M-EST. PATIENT-LVL I: ICD-10-PCS | Mod: PBBFAC,,,

## 2023-10-24 RX ORDER — LORAZEPAM 0.5 MG/1
0.5 TABLET ORAL NIGHTLY PRN
Qty: 30 TABLET | Refills: 3 | Status: SHIPPED | OUTPATIENT
Start: 2023-10-24

## 2023-10-24 NOTE — PROGRESS NOTES
Smoking Cessation Group Session #2    Site: [facility]  Date:  10/24/2023  Clinical Status of Patient: Outpatient   Length of Service and Code: 60 minutes - 88249   Number in Attendance: 3  CO Monitor Score: NA ppm  Group Activities/Focus of Group:  Sharing last weeks challenges, triggers, and coping activities to remain quit and/ or keep making progress toward cessation, completion of TCRS (Tobacco Cessation Rating Scale) learned addiction model, personal reasons for quitting, medications, goals, quit date.    Specific session focus: completion of TCRS (Tobacco Cessation Rating Scale) reviewed strategies, controlling environment, cues, triggers, new goals set. Introduced high risk situations with preparation interventions, withdrawal issues of habit and nicotine, Understanding urges, cravings, stress. Open discussion with intervention discussion.      Target symptoms:  withdrawal and medication side effects             The following were rated moderate (3) to severe (4) on TCRS:       Moderate 3: none     Severe 4:   none  Patient's Response to Intervention: Patient reported that she did start her quit along with Chantix on 10/11. Patient reported that she is down to about 6 cigarettes a day. Commended patient for getting on track with her quit episode. Patient did report that she has been relighting some of the cigarettes. Encouraged patient to be more mindful and try not relighting cigarettes. Discussed staying committed while taking Chantix and setting goals. Patient did report some nausea while taking Chantix but able to tolerate.   Progress Toward Goals and Other Mental Status Changes: The patient denies any abnormal behavioral or mental changes at this time.       Diagnosis: Z17.200  Plan: The patient will continue with group therapy sessions and medication regimen prescribed with management by physician or by the Cessation Clinic Provider. Patient will inform Smoking Cessation Counselor of symptoms as rated high  on TCRS.    Return to Clinic: 2 weeks    Quit Date:    Planned Quit Date:

## 2023-10-24 NOTE — TELEPHONE ENCOUNTER
Care Due:                  Date            Visit Type   Department     Provider  --------------------------------------------------------------------------------                                EP -                              PRIMARY      [facility] INTERNAL  Edwige Rojas  Last Visit: 06-      CARE ([facility])   MEDICINE       Azael  Next Visit: None Scheduled  None         None Found                                                            Last  Test          Frequency    Reason                     Performed    Due Date  --------------------------------------------------------------------------------    Office Visit  6 months...  varenicline..............  06- 12-    [facility] Embedded Care Due Messages. Reference number: [id number].   10/24/2023 10:36:12 AM CDT

## 2023-10-30 ENCOUNTER — PATIENT MESSAGE (OUTPATIENT)
Dept: INTERNAL MEDICINE | Facility: CLINIC | Age: 63
End: 2023-10-30
Payer: COMMERCIAL

## 2023-10-30 DIAGNOSIS — M72.2 PLANTAR FASCIITIS, BILATERAL: ICD-10-CM

## 2023-10-30 DIAGNOSIS — G57.53 TARSAL TUNNEL SYNDROME OF BOTH LOWER EXTREMITIES: ICD-10-CM

## 2023-10-30 RX ORDER — DULOXETIN HYDROCHLORIDE 30 MG/1
30 CAPSULE, DELAYED RELEASE ORAL DAILY
Qty: 30 CAPSULE | Refills: 3 | Status: SHIPPED | OUTPATIENT
Start: 2023-10-30 | End: 2023-11-10 | Stop reason: SDUPTHER

## 2023-11-08 ENCOUNTER — CLINICAL SUPPORT (OUTPATIENT)
Dept: SMOKING CESSATION | Facility: CLINIC | Age: 63
End: 2023-11-08
Payer: COMMERCIAL

## 2023-11-08 DIAGNOSIS — F17.200 NICOTINE DEPENDENCE: Primary | ICD-10-CM

## 2023-11-08 PROCEDURE — 99999 PR PBB SHADOW E&M-EST. PATIENT-LVL II: CPT | Mod: PBBFAC,,,

## 2023-11-08 PROCEDURE — 90853 GROUP PSYCHOTHERAPY: CPT | Mod: S$GLB,,,

## 2023-11-08 PROCEDURE — 90853 PR GROUP PSYCHOTHERAPY: ICD-10-PCS | Mod: S$GLB,,,

## 2023-11-08 PROCEDURE — 99999 PR PBB SHADOW E&M-EST. PATIENT-LVL II: ICD-10-PCS | Mod: PBBFAC,,,

## 2023-11-08 RX ORDER — VARENICLINE TARTRATE 1 MG/1
1 TABLET, FILM COATED ORAL 2 TIMES DAILY
Qty: 56 TABLET | Refills: 0 | Status: SHIPPED | OUTPATIENT
Start: 2023-11-08 | End: 2023-12-05 | Stop reason: SDUPTHER

## 2023-11-08 NOTE — PROGRESS NOTES
"Smoking Cessation Group Session #3    Site: [facility]  Date:  11/8/2023  Clinical Status of Patient: Outpatient   Length of Service and Code: 60 minutes - 06635   Number in Attendance: 2  CO Monitor Score: NA ppm  Group Activities/Focus of Group:  Sharing last weeks challenges, triggers, and coping activities to remain quit and/ or keep making progress toward cessation, completion of TCRS (Tobacco Cessation Rating Scale) learned addiction model, personal reasons for quitting, medications, goals, quit date.    Specific session focus: completion of TCRS (Tobacco Cessation Rating Scale) reviewed strategies, controlling environment, cues, triggers, new goals set. Introduced high risk situations with preparation interventions, caffeine similarities with withdrawal issues of habit and nicotine, Alcohol, Understanding urges, cravings, stress and relaxation. Open discussion with intervention discussion.      Target symptoms:  withdrawal and medication side effects             The following were rated moderate (3) to severe (4) on TCRS:       Moderate 3: nausea     Severe 4:   none  Patient's Response to Intervention: Spoke to patient by phone this evening  was present as well for group phone call. Patient reported that she quit smoking on Halloween 10/31/23. Congratulated patient on her quit. She is exteremly happy. Patient has the mindset and keeps saying to herself "NOPE" " Not one puff ever" to help her get through this quit. Patient also feels that she does not want to start over taking Chantix again due to fighting nausea. Patient able to tolerate nausea. Suggested that she cut back to 0.5 mg in the morning if that is the worse time for her. Encouraged patient to stay focused from here on out with her quit.   Progress Toward Goals and Other Mental Status Changes: The patient denies any abnormal behavioral or mental changes at this time.      Diagnosis: Z17.200  Plan: The patient will continue with group therapy " sessions and medication regimen prescribed with management by physician or by the Cessation Clinic Provider. Patient will inform Smoking Cessation Counselor of symptoms as rated high on TCRS.    Return to Clinic: 2 weeks    Quit Date: 10/31/23

## 2023-11-10 DIAGNOSIS — M72.2 PLANTAR FASCIITIS, BILATERAL: ICD-10-CM

## 2023-11-10 DIAGNOSIS — G57.53 TARSAL TUNNEL SYNDROME OF BOTH LOWER EXTREMITIES: ICD-10-CM

## 2023-11-10 RX ORDER — DULOXETIN HYDROCHLORIDE 30 MG/1
30 CAPSULE, DELAYED RELEASE ORAL DAILY
Qty: 30 CAPSULE | Refills: 3 | Status: SHIPPED | OUTPATIENT
Start: 2023-11-10 | End: 2024-03-13 | Stop reason: ALTCHOICE

## 2023-11-10 NOTE — TELEPHONE ENCOUNTER
No care due was identified.  [facility] Embedded Care Due Messages. Reference number: [id number].   11/10/2023 8:55:48 AM CST

## 2023-11-20 ENCOUNTER — CLINICAL SUPPORT (OUTPATIENT)
Dept: SMOKING CESSATION | Facility: CLINIC | Age: 63
End: 2023-11-20
Payer: COMMERCIAL

## 2023-11-20 DIAGNOSIS — F17.200 NICOTINE DEPENDENCE: Primary | ICD-10-CM

## 2023-11-20 PROCEDURE — 99999 PR PBB SHADOW E&M-EST. PATIENT-LVL II: ICD-10-PCS | Mod: PBBFAC,,,

## 2023-11-20 PROCEDURE — 90853 PR GROUP PSYCHOTHERAPY: ICD-10-PCS | Mod: S$GLB,,,

## 2023-11-20 PROCEDURE — 99999 PR PBB SHADOW E&M-EST. PATIENT-LVL II: CPT | Mod: PBBFAC,,,

## 2023-11-20 PROCEDURE — 90853 GROUP PSYCHOTHERAPY: CPT | Mod: S$GLB,,,

## 2023-11-20 NOTE — PROGRESS NOTES
"Smoking Cessation Group Session #4    Site: [facility]  Date:  11/20/2023  Clinical Status of Patient: Outpatient   Length of Service and Code: 60 minutes - 32885   Number in Attendance: 2  CO Monitor Score: NA ppm  Group Activities/Focus of Group:  Sharing last weeks challenges, triggers, and coping activities to remain quit and/ or keep making progress toward cessation, completion of TCRS (Tobacco Cessation Rating Scale) learned addiction model, personal reasons for quitting, medications, goals, quit date.    Specific session focus: completion of TCRS (Tobacco Cessation Rating Scale) reviewed strategies, habitual behavior, stress, and high risk situations. Introduced stress with addition interventions, SOLVE, relaxation with interventions, nutrition, exercise, weight gain, and the importance of rewarding oneself for accomplishments toward becoming tobacco free. Open discussion of all items with interventions.     Target symptoms:  withdrawal and medication side effects             The following were rated moderate (3) to severe (4) on TCRS:       Moderate 3: none     Severe 4:   none  Patient's Response to Intervention: Patient continues to remain tobacco free since 10/31/23. Congratulated patient on her continued quit episode. Patient stated that she does have the urge at times, but stay focused and get through urge. She continues to state the word "NOPE" No one puff ever. Patient worried about Thanksgiving holiday, they will be socializing around family members that smoke. Encouraged patient to be prepared with mints, gum trying not going outside when others are smoking. Doing the 4 D's method Delay, Distract,Deep Breath and drink water. The patient remains on the prescribed tobacco cessation medication regimen of 1 mg Chantix BID without any negative side effects at this time.    Progress Toward Goals and Other Mental Status Changes: The patient denies any abnormal behavioral or mental changes at this time. "         Diagnosis: Z17.200  Plan: The patient will continue with group therapy sessions and medication regimen prescribed with management by physician or by the Cessation Clinic Provider. Patient will inform Smoking Cessation Counselor of symptoms as rated high on TCRS.    Return to Clinic: 2 weeks    Quit Date: 10/31/23

## 2023-12-05 ENCOUNTER — CLINICAL SUPPORT (OUTPATIENT)
Dept: SMOKING CESSATION | Facility: CLINIC | Age: 63
End: 2023-12-05
Payer: COMMERCIAL

## 2023-12-05 ENCOUNTER — OFFICE VISIT (OUTPATIENT)
Dept: PODIATRY | Facility: CLINIC | Age: 63
End: 2023-12-05
Payer: COMMERCIAL

## 2023-12-05 VITALS
DIASTOLIC BLOOD PRESSURE: 88 MMHG | SYSTOLIC BLOOD PRESSURE: 136 MMHG | BODY MASS INDEX: 24.39 KG/M2 | WEIGHT: 129.19 LBS | HEIGHT: 61 IN | HEART RATE: 89 BPM

## 2023-12-05 DIAGNOSIS — M79.672 FOOT PAIN, BILATERAL: ICD-10-CM

## 2023-12-05 DIAGNOSIS — M54.50 LOW BACK PAIN POTENTIALLY ASSOCIATED WITH RADICULOPATHY: Primary | ICD-10-CM

## 2023-12-05 DIAGNOSIS — M79.671 FOOT PAIN, BILATERAL: ICD-10-CM

## 2023-12-05 DIAGNOSIS — R20.2 PARESTHESIA OF FOOT, BILATERAL: ICD-10-CM

## 2023-12-05 DIAGNOSIS — F17.200 NICOTINE DEPENDENCE: Primary | ICD-10-CM

## 2023-12-05 PROCEDURE — 99213 OFFICE O/P EST LOW 20 MIN: CPT | Mod: S$GLB,,, | Performed by: PODIATRIST

## 2023-12-05 PROCEDURE — 1159F MED LIST DOCD IN RCRD: CPT | Mod: CPTII,S$GLB,, | Performed by: PODIATRIST

## 2023-12-05 PROCEDURE — 1160F RVW MEDS BY RX/DR IN RCRD: CPT | Mod: CPTII,S$GLB,, | Performed by: PODIATRIST

## 2023-12-05 PROCEDURE — 3075F PR MOST RECENT SYSTOLIC BLOOD PRESS GE 130-139MM HG: ICD-10-PCS | Mod: CPTII,S$GLB,, | Performed by: PODIATRIST

## 2023-12-05 PROCEDURE — 99999 PR PBB SHADOW E&M-EST. PATIENT-LVL II: CPT | Mod: PBBFAC,,,

## 2023-12-05 PROCEDURE — 1160F PR REVIEW ALL MEDS BY PRESCRIBER/CLIN PHARMACIST DOCUMENTED: ICD-10-PCS | Mod: CPTII,S$GLB,, | Performed by: PODIATRIST

## 2023-12-05 PROCEDURE — 90853 GROUP PSYCHOTHERAPY: CPT | Mod: S$GLB,,,

## 2023-12-05 PROCEDURE — 3008F PR BODY MASS INDEX (BMI) DOCUMENTED: ICD-10-PCS | Mod: CPTII,S$GLB,, | Performed by: PODIATRIST

## 2023-12-05 PROCEDURE — 1159F PR MEDICATION LIST DOCUMENTED IN MEDICAL RECORD: ICD-10-PCS | Mod: CPTII,S$GLB,, | Performed by: PODIATRIST

## 2023-12-05 PROCEDURE — 99999 PR PBB SHADOW E&M-EST. PATIENT-LVL IV: CPT | Mod: PBBFAC,,, | Performed by: PODIATRIST

## 2023-12-05 PROCEDURE — 99999 PR PBB SHADOW E&M-EST. PATIENT-LVL IV: ICD-10-PCS | Mod: PBBFAC,,, | Performed by: PODIATRIST

## 2023-12-05 PROCEDURE — 3008F BODY MASS INDEX DOCD: CPT | Mod: CPTII,S$GLB,, | Performed by: PODIATRIST

## 2023-12-05 PROCEDURE — 99213 PR OFFICE/OUTPT VISIT, EST, LEVL III, 20-29 MIN: ICD-10-PCS | Mod: S$GLB,,, | Performed by: PODIATRIST

## 2023-12-05 PROCEDURE — 99999 PR PBB SHADOW E&M-EST. PATIENT-LVL II: ICD-10-PCS | Mod: PBBFAC,,,

## 2023-12-05 PROCEDURE — 90853 PR GROUP PSYCHOTHERAPY: ICD-10-PCS | Mod: S$GLB,,,

## 2023-12-05 PROCEDURE — 3079F PR MOST RECENT DIASTOLIC BLOOD PRESSURE 80-89 MM HG: ICD-10-PCS | Mod: CPTII,S$GLB,, | Performed by: PODIATRIST

## 2023-12-05 PROCEDURE — 3075F SYST BP GE 130 - 139MM HG: CPT | Mod: CPTII,S$GLB,, | Performed by: PODIATRIST

## 2023-12-05 PROCEDURE — 3079F DIAST BP 80-89 MM HG: CPT | Mod: CPTII,S$GLB,, | Performed by: PODIATRIST

## 2023-12-05 RX ORDER — VARENICLINE TARTRATE 1 MG/1
1 TABLET, FILM COATED ORAL 2 TIMES DAILY
Qty: 56 TABLET | Refills: 0 | Status: SHIPPED | OUTPATIENT
Start: 2023-12-05 | End: 2024-01-03 | Stop reason: SDUPTHER

## 2023-12-05 RX ORDER — METHYLPREDNISOLONE 4 MG/1
TABLET ORAL
Qty: 1 EACH | Refills: 0 | Status: SHIPPED | OUTPATIENT
Start: 2023-12-05 | End: 2024-03-13

## 2023-12-05 NOTE — PROGRESS NOTES
"Subjective:     Patient ID: Carmen Manriquez is a 63 y.o. female.    Chief Complaint: Foot Pain (Bilateral)    Carmen is a 63 y.o. female who presents to the podiatry clinic  with complaint of  bilateral foot pain. Onset of the symptoms was several years ago. Precipitating event: none known. Current symptoms include:  numbness/tingling/pain in both legs and feet that is constant as well as low back pain that is chronic . Aggravating factors: any weight bearing, pivoting, standing, and walking. Symptoms have progressed to a point and plateaued. Patient has had prior foot problems. Evaluation to date: plain films: normal, MRI: normal, and EMG--normal per patient . Treatment to date:  custom orthotics--didn't help, gabapentin 100mg TID--unsure if it helps, shoe changes--somewhat helps, rest--helps . Patients rates pain 4/10 on pain scale.    Vitals:    12/05/23 1049   BP: 136/88   Pulse: 89   Weight: 58.6 kg (129 lb 3 oz)   Height: 5' 1" (1.549 m)   PainSc:   4   PainLoc: Foot       Review of Systems   Constitutional: Negative for chills and fever.   Cardiovascular:  Negative for chest pain, claudication and leg swelling.   Respiratory:  Negative for cough and shortness of breath.    Skin:  Negative for dry skin and nail changes.   Musculoskeletal:  Positive for myalgias and stiffness.        Foot and leg pain bilateral   Gastrointestinal:  Negative for nausea and vomiting.   Neurological:  Positive for numbness, paresthesias and sensory change.   Psychiatric/Behavioral:  Negative for altered mental status.         Objective:     Physical Exam  Vitals reviewed.   Constitutional:       Appearance: She is well-developed.   HENT:      Head: Normocephalic.   Cardiovascular:      Pulses:           Dorsalis pedis pulses are 2+ on the right side and 2+ on the left side.        Posterior tibial pulses are 2+ on the right side and 2+ on the left side.      Comments: DP and PT pulses are 2/4 bilaterally.        Pulmonary:      " Effort: No respiratory distress.   Musculoskeletal:      Right lower leg: No edema.      Left lower leg: No edema.      Comments: Diffuse pain with palpation and ROM of b/l foot and ankle joints. No focal area of pain palpated.     Normal maintained arch b/l.     Equinus contracture noted to b/l ankles with knee straight and slightly improved with knee bent.      Skin:     General: Skin is warm and dry.      Findings: No erythema.      Comments: No open lesions, lacerations or wounds noted. Nails are normal to R 1-5 and L 1-5. Interdigital spaces clean, dry and intact b/l. No erythema noted to b/l foot. Skin texture normal. Pedal hair normal     Neurological:      Mental Status: She is alert and oriented to person, place, and time.      Sensory: No sensory deficit.      Comments: Light touch, proprioception, and sharp/dull sensation are all intact bilaterally.  Subjective paresthesias with no clearly identifiable source or trigger.    Psychiatric:         Behavior: Behavior normal.         Thought Content: Thought content normal.         Judgment: Judgment normal.           Assessment:      Encounter Diagnoses   Name Primary?    Low back pain potentially associated with radiculopathy Yes    Foot pain, bilateral     Paresthesia of foot, bilateral      Plan:     Carmen was seen today for foot pain.    Diagnoses and all orders for this visit:    Low back pain potentially associated with radiculopathy  -     Ambulatory referral/consult to Orthopedics; Future    Foot pain, bilateral  -     Ambulatory referral/consult to Orthopedics; Future    Paresthesia of foot, bilateral  -     Ambulatory referral/consult to Orthopedics; Future    Other orders  -     methylPREDNISolone (MEDROL DOSEPACK) 4 mg tablet; Use as directed      I counseled the patient on her conditions, their implications and medical management.    No focal area of pain/injury b/l foot or ankle. Pain likely stemming from low back issue leading to b/l LE  radiculopathy.     Rx Medrol dose pack to be taken as directed for pain. Follow prescription instructions while taking medication.    Referral to Ortho for work up of low back and possible treatment. Recommend xray/MRI.     Long discussion with patient regarding appropriate, supportive and comfortable shoes. Recommended supportive style shoe brands with adequate arch supports to alleviate abnormal pressure and improve stability of foot while walking. Avoid flat shoes and barefoot walking as these will exacerbate or worsen symptoms.     RTC 4-6 weeks, sooner PRN

## 2023-12-05 NOTE — PROGRESS NOTES
Smoking Cessation Group Session #5    Site: [facility]  Date:  12/5/2023  Clinical Status of Patient: Outpatient   Length of Service and Code: 60 minutes - 51583   Number in Attendance: 2  CO Monitor Score: NA ppm  Group Activities/Focus of Group:  Sharing last weeks challenges, triggers, and coping activities to remain quit and/ or keep making progress toward cessation, completion of TCRS (Tobacco Cessation Rating Scale) learned addiction model, personal reasons for quitting, medications, goals, quit date.    Specific session focus: completion of TCRS (Tobacco Cessation Rating Scale) reviewed strategies, habitual behavior, high risks situations, understanding urges and cravings, stress and relaxation with open discussion and additional interventions, Introduced lapses, relapses, understanding them and analyzing the situation of a lapse, conflict issues that may be linked to a lapse.       Target symptoms:  withdrawal and medication side effects             The following were rated moderate (3) to severe (4) on TCRS:       Moderate 3: none     Severe 4:   none  Patient's Response to Intervention: Spoke to patient by phone this evening. Patient continues to remain tobacco free since 10/31/23. Congratulated patient on her continued quit. Patient did report that she had several drinks on Thanksgiving and did smoke 1 cigarette, and got back on track the next day. Commended patient for getting back on track and staying focused. The patient remains on the prescribed tobacco cessation medication regimen of 1 mg Chantix BID without any negative side effects at this time.    Progress Toward Goals and Other Mental Status Changes: The patient denies any abnormal behavioral or mental changes at this time.         Diagnosis: Z17.200  Plan: The patient will continue with group therapy sessions and medication regimen prescribed with management by physician or by the Cessation Clinic Provider. Patient will inform Smoking Cessation  Counselor of symptoms as rated high on TCRS.    Return to Clinic: 2 weeks    Quit Date: 10/31/23

## 2023-12-07 ENCOUNTER — PATIENT MESSAGE (OUTPATIENT)
Dept: ORTHOPEDICS | Facility: CLINIC | Age: 63
End: 2023-12-07
Payer: COMMERCIAL

## 2023-12-13 DIAGNOSIS — M54.50 LOW BACK PAIN, UNSPECIFIED BACK PAIN LATERALITY, UNSPECIFIED CHRONICITY, UNSPECIFIED WHETHER SCIATICA PRESENT: Primary | ICD-10-CM

## 2023-12-15 ENCOUNTER — HOSPITAL ENCOUNTER (OUTPATIENT)
Dept: RADIOLOGY | Facility: HOSPITAL | Age: 63
Discharge: HOME | End: 2023-12-15
Attending: OTOLARYNGOLOGY
Payer: COMMERCIAL

## 2023-12-15 DIAGNOSIS — H90.2 CHL (CONDUCTIVE HEARING LOSS): ICD-10-CM

## 2023-12-15 PROCEDURE — 70480 CT TEMPORAL BONE WITHOUT CONTRAST: ICD-10-PCS | Mod: 26,,, | Performed by: RADIOLOGY

## 2023-12-15 PROCEDURE — 70480 CT ORBIT/EAR/FOSSA W/O DYE: CPT | Mod: 26,,, | Performed by: RADIOLOGY

## 2023-12-15 PROCEDURE — 70480 CT ORBIT/EAR/FOSSA W/O DYE: CPT | Mod: TC

## 2023-12-18 ENCOUNTER — OFFICE VISIT (OUTPATIENT)
Dept: ORTHOPEDICS | Facility: CLINIC | Age: 63
End: 2023-12-18
Payer: COMMERCIAL

## 2023-12-18 ENCOUNTER — HOSPITAL ENCOUNTER (OUTPATIENT)
Dept: RADIOLOGY | Facility: HOSPITAL | Age: 63
Discharge: HOME | End: 2023-12-18
Attending: ORTHOPAEDIC SURGERY
Payer: COMMERCIAL

## 2023-12-18 VITALS — WEIGHT: 127.13 LBS | BODY MASS INDEX: 24 KG/M2 | HEIGHT: 61 IN

## 2023-12-18 DIAGNOSIS — M79.672 FOOT PAIN, BILATERAL: ICD-10-CM

## 2023-12-18 DIAGNOSIS — M54.50 LOW BACK PAIN POTENTIALLY ASSOCIATED WITH RADICULOPATHY: ICD-10-CM

## 2023-12-18 DIAGNOSIS — M79.671 FOOT PAIN, BILATERAL: ICD-10-CM

## 2023-12-18 DIAGNOSIS — M54.50 LOW BACK PAIN, UNSPECIFIED BACK PAIN LATERALITY, UNSPECIFIED CHRONICITY, UNSPECIFIED WHETHER SCIATICA PRESENT: ICD-10-CM

## 2023-12-18 DIAGNOSIS — R20.2 PARESTHESIA OF FOOT, BILATERAL: ICD-10-CM

## 2023-12-18 PROCEDURE — 3008F BODY MASS INDEX DOCD: CPT | Mod: CPTII,S$GLB,, | Performed by: ORTHOPAEDIC SURGERY

## 2023-12-18 PROCEDURE — 1160F PR REVIEW ALL MEDS BY PRESCRIBER/CLIN PHARMACIST DOCUMENTED: ICD-10-PCS | Mod: CPTII,S$GLB,, | Performed by: ORTHOPAEDIC SURGERY

## 2023-12-18 PROCEDURE — 3008F PR BODY MASS INDEX (BMI) DOCUMENTED: ICD-10-PCS | Mod: CPTII,S$GLB,, | Performed by: ORTHOPAEDIC SURGERY

## 2023-12-18 PROCEDURE — 72110 XR LUMBAR SPINE AP AND LAT WITH FLEX/EXT: ICD-10-PCS | Mod: 26,,, | Performed by: RADIOLOGY

## 2023-12-18 PROCEDURE — 1160F RVW MEDS BY RX/DR IN RCRD: CPT | Mod: CPTII,S$GLB,, | Performed by: ORTHOPAEDIC SURGERY

## 2023-12-18 PROCEDURE — 72110 X-RAY EXAM L-2 SPINE 4/>VWS: CPT | Mod: TC

## 2023-12-18 PROCEDURE — 99214 PR OFFICE/OUTPT VISIT, EST, LEVL IV, 30-39 MIN: ICD-10-PCS | Mod: S$GLB,,, | Performed by: ORTHOPAEDIC SURGERY

## 2023-12-18 PROCEDURE — 1159F PR MEDICATION LIST DOCUMENTED IN MEDICAL RECORD: ICD-10-PCS | Mod: CPTII,S$GLB,, | Performed by: ORTHOPAEDIC SURGERY

## 2023-12-18 PROCEDURE — 72110 X-RAY EXAM L-2 SPINE 4/>VWS: CPT | Mod: 26,,, | Performed by: RADIOLOGY

## 2023-12-18 PROCEDURE — 99214 OFFICE O/P EST MOD 30 MIN: CPT | Mod: S$GLB,,, | Performed by: ORTHOPAEDIC SURGERY

## 2023-12-18 PROCEDURE — 99999 PR PBB SHADOW E&M-EST. PATIENT-LVL III: CPT | Mod: PBBFAC,,, | Performed by: ORTHOPAEDIC SURGERY

## 2023-12-18 PROCEDURE — 1159F MED LIST DOCD IN RCRD: CPT | Mod: CPTII,S$GLB,, | Performed by: ORTHOPAEDIC SURGERY

## 2023-12-18 PROCEDURE — 99999 PR PBB SHADOW E&M-EST. PATIENT-LVL III: ICD-10-PCS | Mod: PBBFAC,,, | Performed by: ORTHOPAEDIC SURGERY

## 2023-12-18 NOTE — PROGRESS NOTES
DATE: 2023  PATIENT: Carmen Manriquez    Attending Physician: Jose C Pascual M.D.    CHIEF COMPLAINT: LBP    HISTORY:  Carmen Manriquez is a 63 y.o. female presents for initial evaluation of low back pain (Back - 1). The pain has been present for years but it's mild at best. The patient describes the pain as dull but it does not go down legs. She has plantar fasciitis (b/l plantar foot pain) symptoms but she was not diagnosed with plantar fasciitis.  The pain is worse with activity and improved by rest. There is no associated numbness and tingling. There is no subjective weakness. Prior treatments have included meds (tylenol and gabapentin), but no PT, LUIS or surgery.    The Patient denies myelopathic symptoms such as handwriting changes or difficulty with buttons/coins/keys. Denies perineal paresthesias, bowel/bladder dysfunction.    The patient quit smoking on 10/31/23 (used to smoke 1ppd for 40 years); she does not have DM or endorse IVDU. The patient is not on any blood thinners and does not take chronic narcotics. 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    PAST MEDICAL/SURGICAL HISTORY:  Past Medical History:   Diagnosis Date    Abnormal Pap smear     Allergy     Endometriosis, moderate     GERD (gastroesophageal reflux disease)     Heart palpitations      Past Surgical History:   Procedure Laterality Date    BILE DUCT EXPLORATION      BREAST BIOPSY Left     [place]     SECTION, CLASSIC      CHOLECYSTECTOMY      ENDOMETRIAL ABLATION      hx benign breast biopsy      leep      TONSILLECTOMY      TUBAL LIGATION         Current Medications:   Current Outpatient Medications:     conjugated estrogens (PREMARIN) vaginal cream, Apply pea-sized amount nightly for 2 weeks then twice a week there after, Disp: 30 g, Rfl: 4    cyanocobalamin, vitamin B-12, 1,000 mcg TbSR, Take 1 tablet by mouth once daily., Disp: 90 each, Rfl: 0    DULoxetine (CYMBALTA) 30 MG capsule, Take 1 capsule (30  mg total) by mouth once daily., Disp: 30 capsule, Rfl: 3    ergocalciferol (ERGOCALCIFEROL) 50,000 unit Cap, Take 1 capsule (50,000 Units total) by mouth every 7 days., Disp: 12 capsule, Rfl: 4    gabapentin (NEURONTIN) 300 MG capsule, Take 1 capsule (300 mg total) by mouth 3 (three) times daily., Disp: 90 capsule, Rfl: 11    LORazepam (ATIVAN) 0.5 MG tablet, Take 1 tablet (0.5 mg total) by mouth nightly as needed for anxiety/insomnia, Disp: 30 tablet, Rfl: 3    varenicline (CHANTIX) 1 mg Tab, Take 1 tablet (1 mg total) by mouth 2 (two) times daily. Mail to patient address verified, Disp: 56 tablet, Rfl: 0    methylPREDNISolone (MEDROL DOSEPACK) 4 mg tablet, Use as directed (Patient not taking: Reported on 2023), Disp: 1 each, Rfl: 0    Social History:   Social History     Socioeconomic History    Marital status:     Number of children: 1   Occupational History    Occupation:      Employer: [employer]   Tobacco Use    Smoking status: Former     Current packs/day: 0.00     Average packs/day: 1 pack/day for 48.9 years (48.9 ttl pk-yrs)     Types: Cigarettes     Start date:      Quit date: 10/31/2023     Years since quittin.1    Smokeless tobacco: Never    Tobacco comments:     smoker since age 14, used to smoke 1 ppd   Substance and Sexual Activity    Alcohol use: Yes     Alcohol/week: 4.0 standard drinks of alcohol     Types: 4 Cans of beer per week    Drug use: No    Sexual activity: Yes     Partners: Male     Birth control/protection: Post-menopausal   Social History Narrative    No exercise at present.     Social Determinants of Health     Financial Resource Strain: Low Risk  (2023)    Overall Financial Resource Strain (CARDIA)     Difficulty of Paying Living Expenses: Not hard at all   Food Insecurity: No Food Insecurity (2023)    Hunger Vital Sign     Worried About Running Out of Food in the Last Year: Never true     Ran Out of Food in the Last  "Year: Never true   Transportation Needs: No Transportation Needs (11/28/2023)    PRAPARE - Transportation     Lack of Transportation (Medical): No     Lack of Transportation (Non-Medical): No   Physical Activity: Inactive (11/28/2023)    Exercise Vital Sign     Days of Exercise per Week: 0 days     Minutes of Exercise per Session: 0 min   Stress: Stress Concern Present (11/28/2023)    [employer] [place] [employer] - Occupational Stress Questionnaire     Feeling of Stress : Rather much   Social Connections: Unknown (11/28/2023)    Social Connection and Isolation Panel [NHANES]     Frequency of Communication with Friends and Family: Twice a week     Frequency of Social Gatherings with Friends and Family: Once a week     Active Member of Clubs or Organizations: No     Attends Club or Organization Meetings: Never     Marital Status:    Housing Stability: Low Risk  (11/28/2023)    Housing Stability Vital Sign     Unable to Pay for Housing in the Last Year: No     Number of Places Lived in the Last Year: 1     Unstable Housing in the Last Year: No       REVIEW OF SYSTEMS:  Constitution: Negative. Negative for chills, fever and night sweats.   Cardiovascular: Negative for chest pain and syncope.   Respiratory: Negative for cough and shortness of breath.   Gastrointestinal: See HPI. Negative for nausea/vomiting. Negative for abdominal pain.  Genitourinary: See HPI. Negative for discoloration or dysuria.  Skin: Negative for dry skin, itching and rash.   Hematologic/Lymphatic: negative for bleeding/clotting disorders.   Musculoskeletal: Negative for falls and muscle weakness.   Neurological: See HPI. no history of seizures. no history of cranial surgery or shunts.  Endocrine: Negative for polydipsia, polyphagia and polyuria.   Allergic/Immunologic: Negative for hives and persistent infections.    PHYSICAL EXAMINATION:    Ht 5' 1" (1.549 m)   Wt 57.6 kg (127 lb 1.5 oz)   BMI 24.01 kg/m²     General: The " patient is a 63 y.o. female in no apparent distress, the patient is orientatied to person, place and time.   Psych: Normal mood and affect  HEENT: Vision grossly intact, hearing intact to the spoken word.  Lungs: Respirations unlabored.  Gait: Normal station and gait, no difficulty with toe or heel walk.   Skin: Dorsal lumbar skin negative for rashes, lesions, hairy patches and surgical scars.  Range of motion: Lumbar range of motion is acceptable. There is no lumbar tenderness to palpation.  Spinal Balance: Global saggital and coronal spinal balance acceptable, no significant for scoliosis and kyphosis.  Musculoskeletal: No pain with the range of motion of the bilateral hips. No trochanteric tenderness to palpation.  Vascular: Bilateral lower extremities warm and well perfused, Dorsalis pedis pulses 2+ bilaterally.  Neurological: Normal strength and tone in all major motor groups in the bilateral lower extremities. Normal sensation to light touch in the L2-S1 dermatomes bilaterally.  Deep tendon reflexes symmetric 2+ in the bilateral lower extremities.  Negative Babinski bilaterally.    IMAGING:   Today I independently reviewed the following images and my interpretations are as follows:    AP, Lat and Flex/Ex upright L-spine films demonstrate mild spondylosis and DDD without listhesis.    MRI lumbar from 2018 showed no significant stenosis.     DEXA in 2023 showed lumbar T-score of -2.5.    Body mass index is 24.01 kg/m².  Hemoglobin A1C   Date Value Ref Range Status   12/21/2022 5.4 4.0 - 5.6 % Final     Comment:     ADA Screening Guidelines:  5.7-6.4%  Consistent with prediabetes  >or=6.5%  Consistent with diabetes    High levels of fetal hemoglobin interfere with the HbA1C  assay. Heterozygous hemoglobin variants (HbS, HgC, etc)do  not significantly interfere with this assay.   However, presence of multiple variants may affect accuracy.     11/29/2021 5.6 4.0 - 5.6 % Final     Comment:     ADA Screening  Guidelines:  5.7-6.4%  Consistent with prediabetes  >or=6.5%  Consistent with diabetes    High levels of fetal hemoglobin interfere with the HbA1C  assay. Heterozygous hemoglobin variants (HbS, HgC, etc)do  not significantly interfere with this assay.   However, presence of multiple variants may affect accuracy.     09/14/2015 5.4 4.5 - 6.2 % Final         ASSESSMENT/PLAN:    Carmen was seen today for low-back pain.    Diagnoses and all orders for this visit:    Low back pain potentially associated with radiculopathy  -     Ambulatory referral/consult to Orthopedics    Foot pain, bilateral  -     Ambulatory referral/consult to Orthopedics    Paresthesia of foot, bilateral  -     Ambulatory referral/consult to Orthopedics      No follow-ups on file.    Patient has LBP. Her MRI is not impressive; no ortho spine surgical intervention warranted. I discussed the natural history of their diagnoses as well as surgical and nonsurgical treatment options. I educated the patient on the importance of core/back strengthening, correct posture, bending/lifting ergonomics, and low-impact aerobic exercises (walking, elliptical, and aquatherapy). Continue medications and exercises. Patient will follow up PRN. Next step is an updated lumbar MRI.    Jose C Pascual MD  Orthopaedic Spine Surgeon  Department of Orthopaedic Surgery  [phone]

## 2024-01-03 ENCOUNTER — CLINICAL SUPPORT (OUTPATIENT)
Dept: SMOKING CESSATION | Facility: CLINIC | Age: 64
End: 2024-01-03
Payer: COMMERCIAL

## 2024-01-03 DIAGNOSIS — F17.200 NICOTINE DEPENDENCE: Primary | ICD-10-CM

## 2024-01-03 PROCEDURE — 90853 GROUP PSYCHOTHERAPY: CPT | Mod: S$GLB,,,

## 2024-01-03 PROCEDURE — 99999 PR PBB SHADOW E&M-EST. PATIENT-LVL II: CPT | Mod: PBBFAC,,,

## 2024-01-03 RX ORDER — VARENICLINE TARTRATE 1 MG/1
1 TABLET, FILM COATED ORAL 2 TIMES DAILY
Qty: 56 TABLET | Refills: 0 | Status: SHIPPED | OUTPATIENT
Start: 2024-01-03 | End: 2024-02-05 | Stop reason: SDUPTHER

## 2024-01-12 ENCOUNTER — TELEPHONE (OUTPATIENT)
Dept: PODIATRY | Facility: CLINIC | Age: 64
End: 2024-01-12
Payer: COMMERCIAL

## 2024-01-22 ENCOUNTER — CLINICAL SUPPORT (OUTPATIENT)
Dept: SMOKING CESSATION | Facility: CLINIC | Age: 64
End: 2024-01-22
Payer: COMMERCIAL

## 2024-01-22 DIAGNOSIS — F17.200 NICOTINE DEPENDENCE: Primary | ICD-10-CM

## 2024-01-22 PROCEDURE — 99999 PR PBB SHADOW E&M-EST. PATIENT-LVL I: CPT | Mod: PBBFAC,,,

## 2024-01-22 PROCEDURE — 99407 BEHAV CHNG SMOKING > 10 MIN: CPT | Mod: S$GLB,,,

## 2024-01-26 ENCOUNTER — TELEPHONE (OUTPATIENT)
Dept: PODIATRY | Facility: CLINIC | Age: 64
End: 2024-01-26
Payer: COMMERCIAL

## 2024-01-30 ENCOUNTER — OFFICE VISIT (OUTPATIENT)
Dept: PODIATRY | Facility: CLINIC | Age: 64
End: 2024-01-30
Payer: COMMERCIAL

## 2024-01-30 VITALS
WEIGHT: 127 LBS | DIASTOLIC BLOOD PRESSURE: 78 MMHG | SYSTOLIC BLOOD PRESSURE: 129 MMHG | HEIGHT: 61 IN | HEART RATE: 85 BPM | BODY MASS INDEX: 23.98 KG/M2

## 2024-01-30 DIAGNOSIS — G57.91 NEURITIS OF RIGHT FOOT: ICD-10-CM

## 2024-01-30 DIAGNOSIS — G57.92 NEURITIS OF LEFT FOOT: Primary | ICD-10-CM

## 2024-01-30 DIAGNOSIS — M25.579 PAIN IN JOINT INVOLVING ANKLE AND FOOT, UNSPECIFIED LATERALITY: ICD-10-CM

## 2024-01-30 DIAGNOSIS — G57.53 TARSAL TUNNEL SYNDROME, BILATERAL: ICD-10-CM

## 2024-01-30 PROCEDURE — 3078F DIAST BP <80 MM HG: CPT | Mod: CPTII,S$GLB,, | Performed by: PODIATRIST

## 2024-01-30 PROCEDURE — 3074F SYST BP LT 130 MM HG: CPT | Mod: CPTII,S$GLB,, | Performed by: PODIATRIST

## 2024-01-30 PROCEDURE — 1159F MED LIST DOCD IN RCRD: CPT | Mod: CPTII,S$GLB,, | Performed by: PODIATRIST

## 2024-01-30 PROCEDURE — 99213 OFFICE O/P EST LOW 20 MIN: CPT | Mod: S$GLB,,, | Performed by: PODIATRIST

## 2024-01-30 PROCEDURE — 1160F RVW MEDS BY RX/DR IN RCRD: CPT | Mod: CPTII,S$GLB,, | Performed by: PODIATRIST

## 2024-01-30 PROCEDURE — 99999 PR PBB SHADOW E&M-EST. PATIENT-LVL IV: CPT | Mod: PBBFAC,,, | Performed by: PODIATRIST

## 2024-01-30 PROCEDURE — 3008F BODY MASS INDEX DOCD: CPT | Mod: CPTII,S$GLB,, | Performed by: PODIATRIST

## 2024-01-30 RX ORDER — DIAZEPAM 5 MG/1
5 TABLET ORAL EVERY 8 HOURS PRN
COMMUNITY
Start: 2023-12-13 | End: 2024-04-23

## 2024-01-30 NOTE — PROGRESS NOTES
"Subjective:     Patient ID: Carmen Manriquez is a 63 y.o. female.    Chief Complaint: Follow-up (Bilateral foot pain)    Carmen is a 63 y.o. female who presents to the podiatry clinic  with complaint of  bilateral foot pain. Onset of the symptoms was several years ago. Precipitating event: none known. Current symptoms include:  numbness/tingling/pain in both legs and feet that is constant as well as low back pain that is chronic . Aggravating factors: any weight bearing, pivoting, standing, and walking. Symptoms have progressed to a point and plateaued. Patient has had prior foot problems. Evaluation to date: plain films: normal, MRI: normal, and EMG--normal per patient . Treatment to date:  custom orthotics--didn't help, gabapentin 100mg TID--unsure if it helps, shoe changes--somewhat helps, rest--helps . Patients rates pain 4/10 on pain scale.    01/30/2024: follow up for b/l ankle/heel pain. States she went to Ortho and they did not find anything with the back and did not recommend any treatment other than exercising/ROM of low back/legs and follow up with Podiatry. She continues to have tingling/aching pain in both ankles and feet every day. Taking Gabapentin which helps only a little. Previous EMG also negative per patient (outside facility). Here for further recommendations.       Vitals:    01/30/24 1148   BP: 129/78   Pulse: 85   Weight: 57.6 kg (126 lb 15.8 oz)   Height: 5' 1" (1.549 m)   PainSc:   4   PainLoc: Foot       Review of Systems   Constitutional: Negative for chills and fever.   Cardiovascular:  Negative for chest pain, claudication and leg swelling.   Respiratory:  Negative for cough and shortness of breath.    Skin:  Negative for dry skin and nail changes.   Musculoskeletal:  Positive for myalgias and stiffness.        Foot and ankle pain bilateral   Gastrointestinal:  Negative for nausea and vomiting.   Neurological:  Positive for numbness, paresthesias and sensory change. "   Psychiatric/Behavioral:  Negative for altered mental status.         Objective:     Physical Exam  Vitals reviewed.   Constitutional:       Appearance: She is well-developed.   HENT:      Head: Normocephalic.   Cardiovascular:      Pulses:           Dorsalis pedis pulses are 2+ on the right side and 2+ on the left side.        Posterior tibial pulses are 2+ on the right side and 2+ on the left side.      Comments: DP and PT pulses are 2/4 bilaterally.        Pulmonary:      Effort: No respiratory distress.   Musculoskeletal:      Right lower leg: No edema.      Left lower leg: No edema.      Comments: Diffuse pain with palpation and ROM of b/l foot and ankle joints. No focal area of pain palpated.     Normal maintained arch b/l.     Equinus contracture noted to b/l ankles with knee straight and slightly improved with knee bent.      Skin:     General: Skin is warm and dry.      Findings: No erythema.      Comments: No open lesions, lacerations or wounds noted. Nails are normal to R 1-5 and L 1-5. Interdigital spaces clean, dry and intact b/l. No erythema noted to b/l foot. Skin texture normal. Pedal hair normal     Neurological:      Mental Status: She is alert and oriented to person, place, and time.      Sensory: No sensory deficit.      Comments: Light touch, proprioception, and sharp/dull sensation are all intact bilaterally.  Subjective paresthesias with no clearly identifiable source or trigger. + tinels sign tibial nerve b/l tarsal tunnel.    Psychiatric:         Behavior: Behavior normal.         Thought Content: Thought content normal.         Judgment: Judgment normal.           Assessment:      Encounter Diagnoses   Name Primary?    Neuritis of left foot Yes    Neuritis of right foot     Tarsal tunnel syndrome, bilateral     Pain in joint involving ankle and foot, unspecified laterality        Plan:     Carmen was seen today for follow-up.    Diagnoses and all orders for this visit:    Neuritis of left  foot  -     MRI Ankle Without Contrast Left; Future    Neuritis of right foot  -     MRI Ankle Without Contrast Right; Future    Tarsal tunnel syndrome, bilateral  -     MRI Ankle Without Contrast Right; Future  -     MRI Ankle Without Contrast Left; Future    Pain in joint involving ankle and foot, unspecified laterality  -     MRI Ankle Without Contrast Right; Future  -     MRI Ankle Without Contrast Left; Future        I counseled the patient on her conditions, their implications and medical management.     No focal area of pain/injury b/l foot or ankle. Pain likely stemming from low back issue leading to b/l LE radiculopathy vs tarsal tunnel syndrome.     MRI ordered of b/l ankles to assess soft tissues for injury and/or damage given equivocal xray and previous MRI results. Rule out soft tissue abnormality.     Long discussion with patient regarding appropriate, supportive and comfortable shoes. Recommended supportive style shoe brands with adequate arch supports to alleviate abnormal pressure and improve stability of foot while walking. Avoid flat shoes and barefoot walking as these will exacerbate or worsen symptoms.     Has tried medrol, gabapentin, pain medication, PT, arch (custom and OTC).     RTC within 1 week after MRI, sooner PRN

## 2024-02-05 DIAGNOSIS — F17.200 NICOTINE DEPENDENCE: ICD-10-CM

## 2024-02-05 RX ORDER — VARENICLINE TARTRATE 1 MG/1
1 TABLET, FILM COATED ORAL 2 TIMES DAILY
Qty: 56 TABLET | Refills: 0 | Status: SHIPPED | OUTPATIENT
Start: 2024-02-05 | End: 2024-02-26 | Stop reason: SDUPTHER

## 2024-02-15 ENCOUNTER — HOSPITAL ENCOUNTER (OUTPATIENT)
Dept: RADIOLOGY | Facility: HOSPITAL | Age: 64
Discharge: HOME | End: 2024-02-15
Attending: PODIATRIST
Payer: COMMERCIAL

## 2024-02-15 DIAGNOSIS — G57.53 TARSAL TUNNEL SYNDROME, BILATERAL: ICD-10-CM

## 2024-02-15 DIAGNOSIS — G57.91 NEURITIS OF RIGHT FOOT: ICD-10-CM

## 2024-02-15 DIAGNOSIS — M25.579 PAIN IN JOINT INVOLVING ANKLE AND FOOT, UNSPECIFIED LATERALITY: ICD-10-CM

## 2024-02-15 DIAGNOSIS — G57.92 NEURITIS OF LEFT FOOT: ICD-10-CM

## 2024-02-15 PROCEDURE — 73721 MRI JNT OF LWR EXTRE W/O DYE: CPT | Mod: 26,LT,, | Performed by: RADIOLOGY

## 2024-02-15 PROCEDURE — 73721 MRI JNT OF LWR EXTRE W/O DYE: CPT | Mod: 26,RT,, | Performed by: RADIOLOGY

## 2024-02-15 PROCEDURE — 73721 MRI JNT OF LWR EXTRE W/O DYE: CPT | Mod: TC,LT

## 2024-02-15 PROCEDURE — 73721 MRI JNT OF LWR EXTRE W/O DYE: CPT | Mod: TC,RT

## 2024-02-16 ENCOUNTER — TELEPHONE (OUTPATIENT)
Dept: PODIATRY | Facility: CLINIC | Age: 64
End: 2024-02-16
Payer: COMMERCIAL

## 2024-02-16 NOTE — TELEPHONE ENCOUNTER
Spoke with patient in regards to her MRI results per: Dr. Alexis, and she stated she will discuss any questions and concerns at her F/U visit on 02/23/2023            ----- Message from Bernie Alexis DPM sent at 2/16/2024 10:50 AM CST -----  Please call her and let her know that her MRI showed some inflammation and synovitis in the heel joint and ankle joint.  It also picked up a small ganglion cyst in the arch of her foot.  All of this is likely contributing to her pain.  There was no other major abnormalities noted on the MRI.  Please advise her to continue with current treatment options and follow-up as scheduled for further recommendations.  We can try a localized steroid injection to see if that will help improve her pain.  We can also discuss removing the ganglion cyst if her pain is not better.  Thanks

## 2024-02-23 ENCOUNTER — OFFICE VISIT (OUTPATIENT)
Dept: PODIATRY | Facility: CLINIC | Age: 64
End: 2024-02-23
Payer: COMMERCIAL

## 2024-02-23 VITALS
SYSTOLIC BLOOD PRESSURE: 141 MMHG | HEART RATE: 82 BPM | DIASTOLIC BLOOD PRESSURE: 82 MMHG | BODY MASS INDEX: 25.64 KG/M2 | WEIGHT: 135.81 LBS | HEIGHT: 61 IN

## 2024-02-23 DIAGNOSIS — M79.672 FOOT PAIN, LEFT: Primary | ICD-10-CM

## 2024-02-23 DIAGNOSIS — M65.9 SYNOVITIS OF LEFT ANKLE: ICD-10-CM

## 2024-02-23 DIAGNOSIS — G89.29 CHRONIC FOOT PAIN, UNSPECIFIED LATERALITY: ICD-10-CM

## 2024-02-23 DIAGNOSIS — M67.471 GANGLION CYST OF RIGHT FOOT: ICD-10-CM

## 2024-02-23 DIAGNOSIS — M79.673 CHRONIC FOOT PAIN, UNSPECIFIED LATERALITY: ICD-10-CM

## 2024-02-23 PROCEDURE — 3077F SYST BP >= 140 MM HG: CPT | Mod: CPTII,S$GLB,, | Performed by: PODIATRIST

## 2024-02-23 PROCEDURE — 3079F DIAST BP 80-89 MM HG: CPT | Mod: CPTII,S$GLB,, | Performed by: PODIATRIST

## 2024-02-23 PROCEDURE — 99214 OFFICE O/P EST MOD 30 MIN: CPT | Mod: S$GLB,,, | Performed by: PODIATRIST

## 2024-02-23 PROCEDURE — 3008F BODY MASS INDEX DOCD: CPT | Mod: CPTII,S$GLB,, | Performed by: PODIATRIST

## 2024-02-23 PROCEDURE — 1159F MED LIST DOCD IN RCRD: CPT | Mod: CPTII,S$GLB,, | Performed by: PODIATRIST

## 2024-02-23 PROCEDURE — 99999 PR PBB SHADOW E&M-EST. PATIENT-LVL III: CPT | Mod: PBBFAC,,, | Performed by: PODIATRIST

## 2024-02-23 RX ORDER — METHYLPREDNISOLONE 4 MG/1
TABLET ORAL
Qty: 1 EACH | Refills: 0 | Status: SHIPPED | OUTPATIENT
Start: 2024-02-23 | End: 2024-04-23

## 2024-02-23 NOTE — PROGRESS NOTES
Subjective:     Patient ID: Carmen Manriquez is a 63 y.o. female.    Chief Complaint: Follow-up (Left foot)    Carmen is a 63 y.o. female who presents to the podiatry clinic  with complaint of  bilateral foot pain. Onset of the symptoms was several years ago. Precipitating event: none known. Current symptoms include:  numbness/tingling/pain in both legs and feet that is constant as well as low back pain that is chronic . Aggravating factors: any weight bearing, pivoting, standing, and walking. Symptoms have progressed to a point and plateaued. Patient has had prior foot problems. Evaluation to date: plain films: normal, MRI: normal, and EMG--normal per patient . Treatment to date:  custom orthotics--didn't help, gabapentin 100mg TID--unsure if it helps, shoe changes--somewhat helps, rest--helps . Patients rates pain 4/10 on pain scale.    01/30/2024: follow up for b/l ankle/heel pain. States she went to Ortho and they did not find anything with the back and did not recommend any treatment other than exercising/ROM of low back/legs and follow up with Podiatry. She continues to have tingling/aching pain in both ankles and feet every day. Taking Gabapentin which helps only a little. Previous EMG also negative per patient (outside facility). Here for further recommendations.     02/23/2024: follow up for b/l foot pain. Continues to have nerve type of pain in the bottoms of the feet L>R. Had MRIs done which showed R foot ganglion in arch and synovitis of L STJ and ankle but otherwise unimpressive MRI. States she has had this pain on and off for years. Previously seen by neurology who ordered EMG which was essentially normal. Also seen by Rheumatology and was negative for inflammatory conditions. Unsure of why she is still getting pain. Takes Gabapentin 300mg TID and this helps only a little. Has tried heel injection in the past by Ortho which made the pain worse not better. Tried a medrol dose pack a while ago which helped a  "little. Would liek to try this again. Here for further recommendations.       Vitals:    02/23/24 1027   BP: (!) 141/82   Pulse: 82   Weight: 61.6 kg (135 lb 12.9 oz)   Height: 5' 1" (1.549 m)   PainSc:   7   PainLoc: Foot       Review of Systems   Constitutional: Negative for chills and fever.   Cardiovascular:  Negative for chest pain, claudication and leg swelling.   Respiratory:  Negative for cough and shortness of breath.    Skin:  Negative for dry skin and nail changes.   Musculoskeletal:  Positive for myalgias and stiffness.        Foot and ankle pain bilateral   Gastrointestinal:  Negative for nausea and vomiting.   Neurological:  Positive for numbness, paresthesias and sensory change.   Psychiatric/Behavioral:  Negative for altered mental status.         Objective:     Physical Exam  Vitals reviewed.   Constitutional:       Appearance: She is well-developed.   HENT:      Head: Normocephalic.   Cardiovascular:      Pulses:           Dorsalis pedis pulses are 2+ on the right side and 2+ on the left side.        Posterior tibial pulses are 2+ on the right side and 2+ on the left side.      Comments: DP and PT pulses are 2/4 bilaterally.        Pulmonary:      Effort: No respiratory distress.   Musculoskeletal:      Right lower leg: No edema.      Left lower leg: No edema.      Comments: Diffuse pain with palpation and ROM of b/l L>R (mostly under arch of foot) foot and ankle joints. No focal area of pain palpated. No pain with inversion/eversion of STJ b/l. No palpable cyst type structure appreciated on exam R foot today.     Normal maintained arch b/l.     Equinus contracture noted to b/l ankles with knee straight and slightly improved with knee bent.      Skin:     General: Skin is warm and dry.      Findings: No erythema.      Comments: No open lesions, lacerations or wounds noted. Nails are normal to R 1-5 and L 1-5. Interdigital spaces clean, dry and intact b/l. No erythema noted to b/l foot. Skin texture " normal. Pedal hair normal     Neurological:      Mental Status: She is alert and oriented to person, place, and time.      Sensory: No sensory deficit.      Comments: Light touch, proprioception, and sharp/dull sensation are all intact bilaterally.  Subjective paresthesias with no clearly identifiable source or trigger. + tinels sign tibial nerve b/l tarsal tunnel.    Psychiatric:         Behavior: Behavior normal.         Thought Content: Thought content normal.         Judgment: Judgment normal.           Assessment:      Encounter Diagnoses   Name Primary?    Foot pain, left Yes    Synovitis of left ankle     Ganglion cyst of right foot     Chronic foot pain, unspecified laterality          Plan:     Carmen was seen today for follow-up.    Diagnoses and all orders for this visit:    Foot pain, left  -     Ambulatory referral/consult to Pain Clinic; Future    Synovitis of left ankle  -     Ambulatory referral/consult to Pain Clinic; Future    Ganglion cyst of right foot    Chronic foot pain, unspecified laterality  -     Ambulatory referral/consult to Pain Clinic; Future    Other orders  -     methylPREDNISolone (MEDROL DOSEPACK) 4 mg tablet; Use as directed          I counseled the patient on her conditions, their implications and medical management.      No focal area of pain/injury b/l foot or ankle. Pain possibly stemming from low back pain hx leading to b/l LE radiculopathy vs tarsal tunnel syndrome vs. Synovitis.     R foot ganglion cyst clinically stable with no pain.     MRI reviewed in detail with patient.     Long discussion with patient regarding appropriate, supportive and comfortable shoes. Recommended supportive style shoe brands with adequate arch supports to alleviate abnormal pressure and improve stability of foot while walking. Avoid flat shoes and barefoot walking as these will exacerbate or worsen symptoms.     Has tried medrol, gabapentin, pain medication, PT, arch (custom and OTC), EMG,  neurology and rheum referrals with little to no improvement in pain.     Referral to Pain management for further assessment/recommendations.     Would like to try medrol dose pack again. Rx Medrol dose pack to be taken as directed for pain. Follow prescription instructions while taking medication.    RTC 2-3 months, sooner PRN

## 2024-02-26 ENCOUNTER — CLINICAL SUPPORT (OUTPATIENT)
Dept: SMOKING CESSATION | Facility: CLINIC | Age: 64
End: 2024-02-26
Payer: COMMERCIAL

## 2024-02-26 DIAGNOSIS — F17.200 NICOTINE DEPENDENCE: Primary | ICD-10-CM

## 2024-02-26 PROCEDURE — 90853 GROUP PSYCHOTHERAPY: CPT | Mod: S$GLB,,,

## 2024-02-26 PROCEDURE — 99999 PR PBB SHADOW E&M-EST. PATIENT-LVL II: CPT | Mod: PBBFAC,,,

## 2024-02-26 RX ORDER — VARENICLINE TARTRATE 1 MG/1
1 TABLET, FILM COATED ORAL 2 TIMES DAILY
Qty: 56 TABLET | Refills: 0 | Status: SHIPPED | OUTPATIENT
Start: 2024-02-27 | End: 2024-04-23 | Stop reason: ALTCHOICE

## 2024-02-26 NOTE — PROGRESS NOTES
Smoking Cessation Group Session #7 (1 of this benefit period)    Site: [facility]  Date:  2/26/2024  Clinical Status of Patient: Outpatient   Length of Service and Code: 60 minutes - [id number]   Number in Attendance: 2  CO Monitor Score: NA ppm  Group Activities/Focus of Group:  Sharing last weeks challenges, triggers, and coping activities to remain quit and/ or keep making progress toward cessation, completion of TCRS (Tobacco Cessation Rating Scale) learned addiction model, personal reasons for quitting, medications, goals, quit date.    Specific session focus: Completion of TCRS (Tobacco Cessation Rating Scale) reviewed strategies, habitual behavior, high risks situations, understanding urges and cravings, stress and relaxation with open discussion and additional interventions, Introduced lapses, relapses, understanding them and analyzing the situation of a lapse, conflict issues that may be linked to a lapse.     Target symptoms:  withdrawal and medication side effects             The following were rated moderate (3) to severe (4) on TCRS:       Moderate 3: stress,      Severe 4:   none  Patient's Response to Intervention: Spoke to patient by phone this evening with her  present. Patient reported that she has slipped several times over  the last few weekends, while drinking. She reported that when she does slip she may have 3 cigarettes and will go buy them. Patient stated that she had stopped taking the Chantix about a week ago. They are working on shutting company down and it has been stressful. Patient stated that she will start taking Chantix and get back on track with her quit. Will reorder Chantix  Progress Toward Goals and Other Mental Status Changes: The patient denies any abnormal behavioral or mental changes at this time.      Diagnosis: Z17.200  Plan: The patient will continue with group therapy sessions and medication regimen prescribed with management by physician or by the Cessation Clinic  Provider. Patient will inform Smoking Cessation Counselor of symptoms as rated high on TCRS.    Return to Clinic: 3 weeks    Quit Date:    Planned Quit Date:

## 2024-03-13 ENCOUNTER — OFFICE VISIT (OUTPATIENT)
Dept: PAIN MEDICINE | Facility: CLINIC | Age: 64
End: 2024-03-13
Payer: COMMERCIAL

## 2024-03-13 ENCOUNTER — TELEPHONE (OUTPATIENT)
Dept: PAIN MEDICINE | Facility: CLINIC | Age: 64
End: 2024-03-13
Payer: COMMERCIAL

## 2024-03-13 ENCOUNTER — PATIENT MESSAGE (OUTPATIENT)
Dept: INTERNAL MEDICINE | Facility: CLINIC | Age: 64
End: 2024-03-13
Payer: COMMERCIAL

## 2024-03-13 VITALS — DIASTOLIC BLOOD PRESSURE: 82 MMHG | SYSTOLIC BLOOD PRESSURE: 130 MMHG | HEART RATE: 84 BPM

## 2024-03-13 DIAGNOSIS — M65.9 SYNOVITIS OF LEFT ANKLE: ICD-10-CM

## 2024-03-13 DIAGNOSIS — G57.40 TIBIAL NEUROPATHY, UNSPECIFIED LATERALITY: Primary | ICD-10-CM

## 2024-03-13 DIAGNOSIS — M79.673 CHRONIC FOOT PAIN, UNSPECIFIED LATERALITY: ICD-10-CM

## 2024-03-13 DIAGNOSIS — M54.9 DORSALGIA, UNSPECIFIED: ICD-10-CM

## 2024-03-13 DIAGNOSIS — G89.29 CHRONIC FOOT PAIN, UNSPECIFIED LATERALITY: ICD-10-CM

## 2024-03-13 DIAGNOSIS — M54.16 LUMBAR RADICULOPATHY, CHRONIC: Primary | ICD-10-CM

## 2024-03-13 DIAGNOSIS — M79.672 FOOT PAIN, LEFT: ICD-10-CM

## 2024-03-13 PROCEDURE — 3079F DIAST BP 80-89 MM HG: CPT | Mod: CPTII,S$GLB,, | Performed by: STUDENT IN AN ORGANIZED HEALTH CARE EDUCATION/TRAINING PROGRAM

## 2024-03-13 PROCEDURE — 99205 OFFICE O/P NEW HI 60 MIN: CPT | Mod: S$GLB,,, | Performed by: STUDENT IN AN ORGANIZED HEALTH CARE EDUCATION/TRAINING PROGRAM

## 2024-03-13 PROCEDURE — 99999 PR PBB SHADOW E&M-EST. PATIENT-LVL IV: CPT | Mod: PBBFAC,,, | Performed by: STUDENT IN AN ORGANIZED HEALTH CARE EDUCATION/TRAINING PROGRAM

## 2024-03-13 PROCEDURE — 3075F SYST BP GE 130 - 139MM HG: CPT | Mod: CPTII,S$GLB,, | Performed by: STUDENT IN AN ORGANIZED HEALTH CARE EDUCATION/TRAINING PROGRAM

## 2024-03-13 PROCEDURE — 1159F MED LIST DOCD IN RCRD: CPT | Mod: CPTII,S$GLB,, | Performed by: STUDENT IN AN ORGANIZED HEALTH CARE EDUCATION/TRAINING PROGRAM

## 2024-03-13 RX ORDER — ESCITALOPRAM OXALATE 10 MG/1
10 TABLET ORAL DAILY
Qty: 90 TABLET | Refills: 1 | Status: SHIPPED | OUTPATIENT
Start: 2024-03-13 | End: 2024-04-08 | Stop reason: SDUPTHER

## 2024-03-13 NOTE — PROGRESS NOTES
Chronic Pain - New Consult    Referring Physician: Bernie Alexis DPM    Date: 03/13/2024     Re: Carmen Manriquez  MR#: [id number]  YOB: 1960  Age: 63 y.o.    Chief Complaint:   Chief Complaint   Patient presents with    Foot Pain     **This note is dictated using the M*Modal Fluency Direct word recognition program. There are word recognition mistakes that are occasionally missed on review.**    ASSESSMENT: 63 y.o. year old female with foot pain, consistent with     1. Lumbar radiculopathy, chronic  MRI Lumbar Spine Without Contrast      2. Foot pain, left  Ambulatory referral/consult to Pain Clinic      3. Synovitis of left ankle  Ambulatory referral/consult to Pain Clinic      4. Chronic foot pain, unspecified laterality  Ambulatory referral/consult to Pain Clinic      5. Dorsalgia, unspecified  MRI Lumbar Spine Without Contrast        PLAN:     Bilateral plantar foot pain  -unclear etiology. Patient has been seeing ortho foot and podiatry without much improvement  -sounds like plantar fasciiitis, but imaging and treatmetns have not been effective  -discussed that foot pain is not my specialty, and I typically refer to podiatry for this.   -discussed that her pain is within a tibial nerve distribution so we could consider a diagnostic/therapeutic posterior tibial nerve block  -We will schedule the patient for b/l posterior tibial nerve block.  Risks,benefits, and alternatives of the procedure were discussed with the patient and She would like to proceed with the procedure.  At this juncture, we believe that the patient's medical comorbidity status adds medium risk and complexity to our proposed evaluation and treatment.  -A b/l S1 DRG stimulation may be an option, but much more invasive    Sacral radiculopathy  -less likely, but will order MRI lumbar to see if there is any evidence of compression of the S1 nerve root.    - RTC April  - Counseled patient regarding the importance of activity  modification.    The above plan and management options were discussed at length with patient. Patient is in agreement with the above and verbalized understanding. It will be communicated with the referring physician via electronic record, fax, or mail.  Lab/study reports reviewed were important and necessary because subsequent medical and treatment recommendations required review of the above lab/study reports. Images viewed/reviewed above were important and necessary because subsequent medical and treatment recommendations required review of the reviewed image(s).     Electronically signed by:  James De La Garza DO  03/13/2024    Patient was seen in clinic today.  The total amount of time spent on this patient was greater than 1 hour.  Due to medical complexity and multiple issues discussed/addressed I am billing for a level 5 visit. This includes face to face time and non-face to face time preparing to see the patient by reviewing previous labs/imaging, obtaining and/or reviewing separately obtained history, documenting clinical information in the electronic or other health record, independently reviewing results and communicating results to the patient/family/caregiver.  The available imaging was reviewed in person with the patient, pathology and treatment options were explained in detail with the patient.  The patient was given multiple opportunities to ask questions, and all questions were answered.  =========================================================================================================    SUBJECTIVE:    Carmen Manriquez is a 63 y.o. female presents to the clinic for the evaluation of feet pain. The pain started years ago following no inciting event and symptoms have been worsening. The patient states that she has had 3 years of pain on the bottom of her feet.  It has just not been getting worse.  Some days are bad and some days are worse.  She has seen 2 podiatrists, an ortho foot doctor,  ortho spine, specialized orthotics, EMG, MRI x2, foot X-rays and everything has been negative. She states it feels like plantar fascitis but the tests are all negative. Standing longer than 5 minutes is unbearable.  First thing in the morning with putting pressure is worse.  Walking is usually okay once she gets moving.    She gets burning in the feet about 1x/week    Pain Description:    The pain is located in the feet area and does not radiate.    At BEST  3/10   At WORST  7/10 on the WORST day.    On average pain is rated as 4/10.   Today the pain is rated as 4/10  The pain is continuous.  The pain is described as burning and tingling    Symptoms interfere with daily activity.   Exacerbating factors: Standing and Walking.    Mitigating factors nothing.   She reports 7 hours of sleep per night.    Physical Therapy/Home Exercise: Yes, currently has a home exercise program. Does lots of PT at her house for plantar fasciitis.     Current Pain Medications:    - gabapentin, cymbalta    Failed Pain Medications:    - medrol dose pop x2    Pain Treatment Therapies:    Pain procedures:   cortisone shots (made it worse)  Physical Therapy: none  Chiropractor: yes  Acupuncture: yes  TENS unit: yes  Spinal decompression: none  Joint replacement: none    Patient denies urinary incontinence and bowel incontinence.  Patient denies any suicidal or homicidal ideations     report:  Reviewed and consistent with medication use as prescribed.    Imaging:   MRI ankle 02/2024:  1. Small subtalar and distal tibiofibular joint effusions and possible distal tibiofibular synovitis.     MRI foot Left/Right 04/2023:  1. Unremarkable appearance of the plantar fascia.         3/13/2024    12:58 PM   Pain Disability Index (PDI)   Family/Home Responsibilities: 4   Recreation: 4   Occupation: 4   Sexual Behavior: 4   Self Care: 4   Life-Support Activities: 4   Pain Disability Index (PDI) 28        Past Medical History:   Diagnosis Date     Abnormal Pap smear     Allergy     Endometriosis, moderate     GERD (gastroesophageal reflux disease)     Heart palpitations      Past Surgical History:   Procedure Laterality Date    BILE DUCT EXPLORATION      BREAST BIOPSY Left 2015    [place]     SECTION, CLASSIC      CHOLECYSTECTOMY      ENDOMETRIAL ABLATION      hx benign breast biopsy      leep      TONSILLECTOMY      TUBAL LIGATION       Social History     Socioeconomic History    Marital status:     Number of children: 1   Occupational History    Occupation:      Employer: [employer]   Tobacco Use    Smoking status: Some Days     Current packs/day: 0.25     Average packs/day: 1 pack/day for 49.0 years (49.0 ttl pk-yrs)     Types: Cigarettes     Start date:      Last attempt to quit: 10/31/2023    Smokeless tobacco: Never    Tobacco comments:     smoker since age 14, used to smoke 1 ppd   Substance and Sexual Activity    Alcohol use: Yes     Alcohol/week: 4.0 standard drinks of alcohol     Types: 4 Cans of beer per week    Drug use: No    Sexual activity: Yes     Partners: Male     Birth control/protection: Post-menopausal   Social History Narrative    No exercise at present.     Social Determinants of Health     Financial Resource Strain: Low Risk  (2023)    Overall Financial Resource Strain (CARDIA)     Difficulty of Paying Living Expenses: Not hard at all   Food Insecurity: No Food Insecurity (2023)    Hunger Vital Sign     Worried About Running Out of Food in the Last Year: Never true     Ran Out of Food in the Last Year: Never true   Transportation Needs: No Transportation Needs (2023)    PRAPARE - Transportation     Lack of Transportation (Medical): No     Lack of Transportation (Non-Medical): No   Physical Activity: Inactive (2023)    Exercise Vital Sign     Days of Exercise per Week: 0 days     Minutes of Exercise per Session: 0 min   Stress: Stress Concern Present (2023)     [place] citizen [place] [employer] Occupational [employer] - Occupational Stress Questionnaire     Feeling of Stress : Rather much   Social Connections: Unknown (11/28/2023)    Social Connection and Isolation Panel [NHANES]     Frequency of Communication with Friends and Family: Twice a week     Frequency of Social Gatherings with Friends and Family: Once a week     Active Member of Clubs or Organizations: No     Attends Club or Organization Meetings: Never     Marital Status:    Housing Stability: Low Risk  (11/28/2023)    Housing Stability Vital Sign     Unable to Pay for Housing in the Last Year: No     Number of Places Lived in the Last Year: 1     Unstable Housing in the Last Year: No     Family History   Problem Relation Age of Onset    Diabetes Mother     Cancer Brother         gallbladder ca    Alcohol abuse Father     Breast cancer Paternal Grandmother     Cancer Paternal Grandmother         breast    Cervical cancer Maternal Grandmother     Cancer Maternal Grandmother         cervical    Ovarian cancer Maternal Grandmother     Heart disease Maternal Uncle     Kidney disease Sister         nephrotic syndrome    No Known Problems Son     No Known Problems Sister     Colon cancer Neg Hx        Review of patient's allergies indicates:   Allergen Reactions    Ciprofloxacin Hives    Sulfa (sulfonamide antibiotics) Other (See Comments)     Cp        Current Outpatient Medications   Medication Sig    conjugated estrogens (PREMARIN) vaginal cream Apply pea-sized amount nightly for 2 weeks then twice a week there after    cyanocobalamin, vitamin B-12, 1,000 mcg TbSR Take 1 tablet by mouth once daily.    diazePAM (VALIUM) 5 MG tablet Take 5 mg by mouth every 8 (eight) hours as needed.    ergocalciferol (ERGOCALCIFEROL) 50,000 unit Cap Take 1 capsule (50,000 Units total) by mouth every 7 days.    EScitalopram oxalate (LEXAPRO) 10 MG tablet Take 1 tablet (10 mg total) by mouth once daily.    gabapentin (NEURONTIN) 300 MG  capsule Take 1 capsule (300 mg total) by mouth 3 (three) times daily.    LORazepam (ATIVAN) 0.5 MG tablet Take 1 tablet (0.5 mg total) by mouth nightly as needed for anxiety/insomnia    methylPREDNISolone (MEDROL DOSEPACK) 4 mg tablet Use as directed    varenicline (CHANTIX) 1 mg Tab Take 1 tablet (1 mg total) by mouth 2 (two) times daily. Mail to patient address verified    methylPREDNISolone (MEDROL DOSEPACK) 4 mg tablet Use as directed (Patient not taking: Reported on 12/18/2023)     No current facility-administered medications for this visit.       REVIEW OF SYSTEMS:    GENERAL:  No weight loss, malaise or fevers.  HEENT:   No recent changes in vision or hearing  NECK:  Negative for lumps, no difficulty with swallowing.  RESPIRATORY:  Negative for cough, wheezing or shortness of breath, patient denies any recent URI.  CARDIOVASCULAR:  Negative for chest pain, leg swelling or palpitations.  GI:  Negative for abdominal discomfort, blood in stools or black stools or change in bowel habits.  MUSCULOSKELETAL:  See HPI.  SKIN:  Negative for lesions, rash, and itching.  PSYCH:  No mood disorder or recent psychosocial stressors.  Patients sleep is not disturbed secondary to pain.  HEMATOLOGY/LYMPHOLOGY:  Negative for prolonged bleeding, bruising easily or swollen nodes.  Patient is not currently taking any anti-coagulants  NEURO:   No history of headaches, syncope, paralysis, seizures or tremors.  All other reviewed and negative other than HPI.    OBJECTIVE:    /82 (BP Location: Right arm, Patient Position: Sitting)   Pulse 84     PHYSICAL EXAMINATION:    GENERAL: Well appearing, in no acute distress, alert and oriented x3.  PSYCH:  Mood and affect appropriate.  SKIN: Skin color, texture, turgor normal, no rashes or lesions.  HEAD/FACE:  Normocephalic, atraumatic. Cranial nerves grossly intact.  CV: RRR with palpation of the radial artery.  PULM: CTAB. No evidence of respiratory difficulty, symmetric chest  rise.  GI:  Soft and non-tender.    BACK:   - No obvious deformity or signs of trauma, Normal lumbar lordotic curve  - Negative spinous process tenderness  - Negative paravertebral tenderness  - Positive pain to palpation over the facet joints of the lumbar spine.   - Negative QL / Iliac crest / Glut tenderness  - Slump test is Negative for radicular pain  - Slump test is Negative for back pain  - Supine Straight leg raising is Negative for radicular pain  - Supine Straight leg raising is Negative for back pain  - Lumbar ROM is normal in Flexion without pain  - Lumbar ROM is normal in Extension without pain  - Did not perform Sustained Hip Flexion test (for discogenic pain)  - Positive Altered Gait, Posture  - Axial facet loading test Negative on the bilateral side(s)    MUSKULOSKELETAL:    EXTREMITIES:   B/l Feet:  TTP bilateral midfoot and forefoot. Increased sensitivity to light pressure.  (+) numbness in the b/l plantar foot    NEUROLOGICAL EXAM:  MENTAL STATUS: A x O x 3, good concentration, speech is fluent and goal directed  MEMORY: recent and remote are intact  CN: CN2-12 grossly intact  MOTOR: 5/5 in all muscle groups  DTRs: 2+ intact symmetric  Sensation:    -yes Loss of sensation in a left lower and right lower  plantar foot  distribution.  Babinski: absent on the bilateral side(s)

## 2024-03-18 ENCOUNTER — CLINICAL SUPPORT (OUTPATIENT)
Dept: SMOKING CESSATION | Facility: CLINIC | Age: 64
End: 2024-03-18
Payer: COMMERCIAL

## 2024-03-18 DIAGNOSIS — F17.200 NICOTINE DEPENDENCE: Primary | ICD-10-CM

## 2024-03-18 PROCEDURE — 99999 PR PBB SHADOW E&M-EST. PATIENT-LVL I: CPT | Mod: PBBFAC,,,

## 2024-03-18 PROCEDURE — 90853 GROUP PSYCHOTHERAPY: CPT | Mod: S$GLB,,,

## 2024-03-18 NOTE — PROGRESS NOTES
"Smoking Cessation Group Session #8    Site: [facility]  Date:  3/18/2024  Clinical Status of Patient: Outpatient   Length of Service and Code: 60 minutes - 79788   Number in Attendance: 2  CO Monitor Score: NA ppm  Group Activities/Focus of Group:  Sharing last weeks challenges, triggers, and coping activities to remain quit and/ or keep making progress toward cessation, completion of TCRS (Tobacco Cessation Rating Scale) learned addiction model, personal reasons for quitting, medications, goals, quit date.    Target symptoms:  withdrawal and medication side effects             The following were rated moderate (3) to severe (4) on TCRS:       Moderate 3: urge     Severe 4:   none  Patient's Response to Intervention: Spoke to patient by phone this evening with her  present. Patient reported that she may be smoking more frequent, stress related. They are both working on closing company, she is more on the paperwork end. Patient feels that she knows that she needs to stop, but feels that she will when her  decides to "say enough is enough". She has obtained a quit on  her own in the past. Encouraged patient to stay focused on her quit journey, and don't let anyone derail you. Remember you slogan "one puff never" each time she lights a cigarette. Patient agrees with  that they feel they know what needs to be done when it comes to quitting and that it is left up to them. They both would like to step back from program at this time. They are both going through medical issues and would like to focus on that at this time. Patient has contact information if any additional help is needed. Patient remains on Chantix 1 mg Bid without any negative side effects.   Progress Toward Goals and Other Mental Status Changes: The patient denies any abnormal behavioral or mental changes at this time.        Diagnosis: Z17.200  Plan: The patient will continue with group therapy sessions and medication regimen prescribed " with management by physician or by the Cessation Clinic Provider. Patient will inform Smoking Cessation Counselor of symptoms as rated high on TCRS.    Return to Clinic: Finished program    Quit Date:    Planned Quit Date:

## 2024-03-20 ENCOUNTER — HOSPITAL ENCOUNTER (OUTPATIENT)
Dept: RADIOLOGY | Facility: HOSPITAL | Age: 64
Discharge: HOME | End: 2024-03-20
Attending: STUDENT IN AN ORGANIZED HEALTH CARE EDUCATION/TRAINING PROGRAM
Payer: COMMERCIAL

## 2024-03-20 DIAGNOSIS — M54.9 DORSALGIA, UNSPECIFIED: ICD-10-CM

## 2024-03-20 DIAGNOSIS — M54.16 LUMBAR RADICULOPATHY, CHRONIC: ICD-10-CM

## 2024-03-20 PROCEDURE — 72148 MRI LUMBAR SPINE W/O DYE: CPT | Mod: TC

## 2024-03-20 PROCEDURE — 72148 MRI LUMBAR SPINE W/O DYE: CPT | Mod: 26,,, | Performed by: RADIOLOGY

## 2024-03-25 ENCOUNTER — PATIENT MESSAGE (OUTPATIENT)
Dept: PAIN MEDICINE | Facility: CLINIC | Age: 64
End: 2024-03-25
Payer: COMMERCIAL

## 2024-03-27 ENCOUNTER — CLINICAL SUPPORT (OUTPATIENT)
Dept: SMOKING CESSATION | Facility: CLINIC | Age: 64
End: 2024-03-27
Payer: COMMERCIAL

## 2024-03-27 DIAGNOSIS — F17.200 NICOTINE DEPENDENCE: Primary | ICD-10-CM

## 2024-03-27 PROCEDURE — 99407 BEHAV CHNG SMOKING > 10 MIN: CPT | Mod: S$GLB,,,

## 2024-03-27 PROCEDURE — 99999 PR PBB SHADOW E&M-EST. PATIENT-LVL I: CPT | Mod: PBBFAC,,,

## 2024-03-27 NOTE — PROGRESS NOTES
"Spoke with patient today in regard to smoking cessation progress for 6 month telephone follow up, she states not tobacco free. Patient states having cut down on smoking "a whole lot" and not ready to return to the program at this time. Informed patient of benefit period, future follow ups, and contact information if any further help or support is needed. Will complete smart form for 3 and 6 month follow up on Quit attempt #3.      "

## 2024-04-08 RX ORDER — ESCITALOPRAM OXALATE 10 MG/1
10 TABLET ORAL DAILY
Qty: 90 TABLET | Refills: 1 | Status: SHIPPED | OUTPATIENT
Start: 2024-04-08 | End: 2025-04-08

## 2024-04-08 NOTE — TELEPHONE ENCOUNTER
Care Due:                  Date            Visit Type   Department     Provider  --------------------------------------------------------------------------------                                EP -                              PRIMARY      [facility] INTERNAL  Edwige Rojas  Last Visit: 06-      CARE ([facility])   MEDICINE       Azael                              MYCHART                              ANNUAL                              CHECKUP/PHY  [facility] INTERNAL  Edwige Rojas  Next Visit: 04-      S            PETER Addison                                                            Last  Test          Frequency    Reason                     Performed    Due Date  --------------------------------------------------------------------------------    CMP.........  12 months..  ergocalciferol,            12- 05-                             varenicline..............    Vitamin D...  12 months..  ergocalciferol...........  12- 12-    [facility] Embedded Care Due Messages. Reference number: [id number].   4/08/2024 12:51:34 PM CDT

## 2024-04-23 ENCOUNTER — OFFICE VISIT (OUTPATIENT)
Dept: INTERNAL MEDICINE | Facility: CLINIC | Age: 64
End: 2024-04-23
Payer: COMMERCIAL

## 2024-04-23 VITALS
BODY MASS INDEX: 25.14 KG/M2 | HEIGHT: 61 IN | WEIGHT: 133.19 LBS | OXYGEN SATURATION: 98 % | SYSTOLIC BLOOD PRESSURE: 128 MMHG | DIASTOLIC BLOOD PRESSURE: 68 MMHG | HEART RATE: 98 BPM

## 2024-04-23 DIAGNOSIS — F41.9 ANXIETY: ICD-10-CM

## 2024-04-23 DIAGNOSIS — R41.3 MEMORY LOSS: ICD-10-CM

## 2024-04-23 DIAGNOSIS — E53.8 B12 DEFICIENCY: ICD-10-CM

## 2024-04-23 DIAGNOSIS — M79.672 PAIN IN BOTH FEET: ICD-10-CM

## 2024-04-23 DIAGNOSIS — M79.671 PAIN IN BOTH FEET: ICD-10-CM

## 2024-04-23 DIAGNOSIS — Z12.2 SCREENING FOR LUNG CANCER: ICD-10-CM

## 2024-04-23 DIAGNOSIS — Z00.00 ANNUAL PHYSICAL EXAM: Primary | ICD-10-CM

## 2024-04-23 DIAGNOSIS — E55.9 VITAMIN D DEFICIENCY: ICD-10-CM

## 2024-04-23 PROCEDURE — 3078F DIAST BP <80 MM HG: CPT | Mod: CPTII,S$GLB,, | Performed by: INTERNAL MEDICINE

## 2024-04-23 PROCEDURE — 99396 PREV VISIT EST AGE 40-64: CPT | Mod: S$GLB,,, | Performed by: INTERNAL MEDICINE

## 2024-04-23 PROCEDURE — 3074F SYST BP LT 130 MM HG: CPT | Mod: CPTII,S$GLB,, | Performed by: INTERNAL MEDICINE

## 2024-04-23 PROCEDURE — 3008F BODY MASS INDEX DOCD: CPT | Mod: CPTII,S$GLB,, | Performed by: INTERNAL MEDICINE

## 2024-04-23 PROCEDURE — 99999 PR PBB SHADOW E&M-EST. PATIENT-LVL IV: CPT | Mod: PBBFAC,,, | Performed by: INTERNAL MEDICINE

## 2024-04-23 PROCEDURE — 1159F MED LIST DOCD IN RCRD: CPT | Mod: CPTII,S$GLB,, | Performed by: INTERNAL MEDICINE

## 2024-04-23 PROCEDURE — 1160F RVW MEDS BY RX/DR IN RCRD: CPT | Mod: CPTII,S$GLB,, | Performed by: INTERNAL MEDICINE

## 2024-04-23 NOTE — PROGRESS NOTES
Subjective:       Patient ID: Carmen Manriquez is a 63 y.o. female.    Chief Complaint: Annual Exam  This is a 63-year-old who presents today for physical.  Patient reports that she continues to have issues with her feet chronic discomfort in the bilateral underneath her feet she has seen several podiatrist and orthopedic specialist she has more recently gone to see pain management was considering a nerve block but was not sure if it was going to be covered she does continue to have issues with some situational stress and anxiety as her  is going through pancreatic cancer treatments and metastatic.  She reports that she feels like her memory isn't as good because she is running around doing a lot of things but gives examples of forgetting which makes it to get off of when she was going some where she had been to many times or forgetting where she put her keys.  She does remain on Lexapro had tried Cymbalta did not find that it helped much.  She quit smoking since last visit.  She has had trend bull with fluid in her ears on occasion with previous infections reports the tube was removed she would some fullness yesterday but it seems a bit better today    HPI  Review of Systems   Constitutional:  Negative for activity change and unexpected weight change.   HENT:  Positive for hearing loss. Negative for rhinorrhea and trouble swallowing.    Eyes:  Negative for discharge and visual disturbance.   Respiratory:  Negative for chest tightness and wheezing.    Cardiovascular:  Negative for chest pain and palpitations.   Gastrointestinal:  Negative for blood in stool, constipation, diarrhea and vomiting.   Endocrine: Negative for polydipsia and polyuria.   Genitourinary:  Negative for difficulty urinating, dysuria, hematuria and menstrual problem.   Musculoskeletal:  Positive for arthralgias. Negative for joint swelling and neck pain.   Neurological:  Positive for headaches. Negative for weakness.  "  Psychiatric/Behavioral:  Positive for confusion and dysphoric mood.        Objective:    Blood pressure 128/68, pulse 98, height 5' 1" (1.549 m), weight 60.4 kg (133 lb 2.5 oz), SpO2 98%.   Physical Exam  Constitutional:       General: She is not in acute distress.  HENT:      Head: Normocephalic.      Mouth/Throat:      Pharynx: Oropharynx is clear.   Eyes:      General: No scleral icterus.  Cardiovascular:      Rate and Rhythm: Normal rate and regular rhythm.      Heart sounds: Normal heart sounds. No murmur heard.     No friction rub. No gallop.      Comments: Breast : normal no masses or tenderness      Pulmonary:      Effort: Pulmonary effort is normal. No respiratory distress.      Breath sounds: Normal breath sounds.   Abdominal:      General: Bowel sounds are normal.      Palpations: Abdomen is soft. There is no mass.      Tenderness: There is no abdominal tenderness.   Musculoskeletal:      Cervical back: Neck supple.      Comments: No visible defomity pt notes discomfort deep palpation  Plantar region bilaterally    Skin:     Findings: No erythema.   Neurological:      Mental Status: She is alert.   Psychiatric:         Mood and Affect: Mood normal.         Assessment:       1. Annual physical exam    2. Anxiety    3. Screening for lung cancer    4. Pain in both feet    5. B12 deficiency    6. Vitamin D deficiency    7. Memory loss        Plan:       Carmen was seen today for annual exam.    Diagnoses and all orders for this visit:    Annual physical exam  -     CBC Auto Differential; Future  -     Comprehensive Metabolic Panel; Future  -     Hemoglobin A1C; Future  -     Lipid Panel; Future  -     TSH; Future  -     Vitamin D; Future  -     Vitamin B12; Future    Anxiety  With situational stressors regarding her 's illness continue conservative measures she remains on Lexapro will continue   She uses ativan at bedtime discussed she may consider reaching out for counseling through Oncology " Department with her  if she feels the need    Screening for lung cancer  She reports she quit smoking about 6 months ago discussed CT lung cancer screening  -     CT Chest Lung Screening Low Dose; Future    Pain in both feet  Chronic we discussed positional changes stretching exercises she is tried those she is tried steroids and injections without much relief previously she has seen Podiatry and has been following more recently with pain management for treatment options.    B12 deficiency  Remains on oral B12 will check level    Her Eustachian dysfunction we discussed she could consider Flonase    Vitamin D deficiency  Will check level and review    Memory loss  Some increased situational stressors maybe contributing discussed imaging and/or neurology consultation she declined at this time due taking care of her  but will call if she would like to do so in the future if things persist mini-mental status was 28/30 today she forgot 1 word and 1 season                Answers submitted by the patient for this visit:  Review of Systems Questionnaire (Submitted on 4/20/2024)  activity change: No  unexpected weight change: No  neck pain: No  hearing loss: Yes  rhinorrhea: No  trouble swallowing: No  eye discharge: No  visual disturbance: No  chest tightness: No  wheezing: No  chest pain: No  palpitations: No  blood in stool: No  constipation: No  vomiting: No  diarrhea: No  polydipsia: No  polyuria: No  difficulty urinating: No  hematuria: No  menstrual problem: No  dysuria: No  joint swelling: No  arthralgias: Yes  headaches: Yes  weakness: No  confusion: Yes  dysphoric mood: Yes

## 2024-04-26 ENCOUNTER — HOSPITAL ENCOUNTER (OUTPATIENT)
Dept: RADIOLOGY | Facility: HOSPITAL | Age: 64
Discharge: HOME | End: 2024-04-26
Attending: INTERNAL MEDICINE
Payer: COMMERCIAL

## 2024-04-26 DIAGNOSIS — Z12.2 SCREENING FOR LUNG CANCER: ICD-10-CM

## 2024-04-26 DIAGNOSIS — E04.1 THYROID NODULE: Primary | ICD-10-CM

## 2024-04-26 PROCEDURE — 71271 CT THORAX LUNG CANCER SCR C-: CPT | Mod: 26,,, | Performed by: STUDENT IN AN ORGANIZED HEALTH CARE EDUCATION/TRAINING PROGRAM

## 2024-04-26 PROCEDURE — 71271 CT THORAX LUNG CANCER SCR C-: CPT | Mod: TC

## 2024-05-06 ENCOUNTER — HOSPITAL ENCOUNTER (OUTPATIENT)
Dept: RADIOLOGY | Facility: HOSPITAL | Age: 64
Discharge: HOME | End: 2024-05-06
Attending: INTERNAL MEDICINE
Payer: COMMERCIAL

## 2024-05-06 DIAGNOSIS — E04.1 THYROID NODULE: ICD-10-CM

## 2024-05-06 PROCEDURE — 76536 US EXAM OF HEAD AND NECK: CPT | Mod: TC

## 2024-05-06 PROCEDURE — 76536 US EXAM OF HEAD AND NECK: CPT | Mod: 26,,, | Performed by: RADIOLOGY

## 2024-05-07 DIAGNOSIS — E04.1 THYROID NODULE: Primary | ICD-10-CM

## 2024-05-27 ENCOUNTER — OFFICE VISIT (OUTPATIENT)
Dept: OTOLARYNGOLOGY | Facility: CLINIC | Age: 64
End: 2024-05-27
Payer: COMMERCIAL

## 2024-05-27 ENCOUNTER — TELEPHONE (OUTPATIENT)
Dept: INTERVENTIONAL RADIOLOGY/VASCULAR | Facility: HOSPITAL | Age: 64
End: 2024-05-27
Payer: COMMERCIAL

## 2024-05-27 VITALS
SYSTOLIC BLOOD PRESSURE: 113 MMHG | HEIGHT: 61 IN | WEIGHT: 130.5 LBS | BODY MASS INDEX: 24.64 KG/M2 | HEART RATE: 80 BPM | DIASTOLIC BLOOD PRESSURE: 76 MMHG

## 2024-05-27 DIAGNOSIS — E04.1 THYROID NODULE: Primary | ICD-10-CM

## 2024-05-27 PROCEDURE — 99204 OFFICE O/P NEW MOD 45 MIN: CPT | Mod: S$GLB,,, | Performed by: OTOLARYNGOLOGY

## 2024-05-27 PROCEDURE — 3044F HG A1C LEVEL LT 7.0%: CPT | Mod: CPTII,S$GLB,, | Performed by: OTOLARYNGOLOGY

## 2024-05-27 PROCEDURE — 99999 PR PBB SHADOW E&M-EST. PATIENT-LVL III: CPT | Mod: PBBFAC,,, | Performed by: OTOLARYNGOLOGY

## 2024-05-27 PROCEDURE — 3074F SYST BP LT 130 MM HG: CPT | Mod: CPTII,S$GLB,, | Performed by: OTOLARYNGOLOGY

## 2024-05-27 PROCEDURE — 3008F BODY MASS INDEX DOCD: CPT | Mod: CPTII,S$GLB,, | Performed by: OTOLARYNGOLOGY

## 2024-05-27 PROCEDURE — 1159F MED LIST DOCD IN RCRD: CPT | Mod: CPTII,S$GLB,, | Performed by: OTOLARYNGOLOGY

## 2024-05-27 PROCEDURE — 3078F DIAST BP <80 MM HG: CPT | Mod: CPTII,S$GLB,, | Performed by: OTOLARYNGOLOGY

## 2024-05-27 NOTE — TELEPHONE ENCOUNTER
Attempted to call patient to schedule IR procedure. Patient lives on the [facility] and will rather go to another campus for procedure. I let the ordering physician know.

## 2024-05-27 NOTE — PROGRESS NOTES
No chief complaint on file.        64 y.o. female presents for evaluation of a right thyroid nodule meeting criteria for FNA. Nodule was an incidental finding on recent chest CT. No associated voice changes, dysphagia or orthopnea. No family history of thyroid carcinoma. Denies personal history of head and neck radiation. She is euthyroid.       Review of Systems     Constitutional: Negative for fatigue and unexpected weight change.   HENT: per HPI.  Eyes: Negative for visual disturbance.   Respiratory: Negative for shortness of breath, hemoptysis  Cardiovascular: Negative for chest pain and palpitations.   Genitourinary: Negative for dysuria and difficulty urinating.   Musculoskeletal: Negative for decreased ROM, back pain.   Skin: Negative for rash, sunburn, itching.   Neurological: Negative for dizziness and seizures.   Hematological: Negative for adenopathy. Does not bruise/bleed easily.   Psychiatric/Behavioral: Negative for agitation. The patient is not nervous/anxious.   Endocrine: Negative for rapid weight loss/weight gain, heat/cold intolerance.     Past Medical History:   Diagnosis Date    Abnormal Pap smear     Allergy     Endometriosis, moderate     GERD (gastroesophageal reflux disease)     Heart palpitations        Past Surgical History:   Procedure Laterality Date    BILE DUCT EXPLORATION      BREAST BIOPSY Left     [place]     SECTION, CLASSIC      CHOLECYSTECTOMY      ENDOMETRIAL ABLATION      hx benign breast biopsy      leep      TONSILLECTOMY      TUBAL LIGATION         family history includes Alcohol abuse in her father; Breast cancer in her paternal grandmother; Cancer in her brother, maternal grandmother, and paternal grandmother; Cervical cancer in her maternal grandmother; Diabetes in her mother; Heart disease in her maternal uncle; Kidney disease in her sister; No Known Problems in her sister and son; Ovarian cancer in her maternal grandmother.    Pt  reports that she quit smoking  about 6 months ago. Her smoking use included cigarettes. She started smoking about 50 years ago. She has a 49 pack-year smoking history. She has never used smokeless tobacco. She reports current alcohol use of about 4.0 standard drinks of alcohol per week. She reports that she does not use drugs.    Review of patient's allergies indicates:   Allergen Reactions    Ciprofloxacin Hives    Sulfa (sulfonamide antibiotics) Other (See Comments)     Cp         Physical Exam    Vitals:    05/27/24 1307   BP: 113/76   Pulse: 80     Body mass index is 24.66 kg/m².    Physical Exam  Vitals and nursing note reviewed.   Constitutional:       General: She is not in acute distress.     Appearance: She is well-developed. She is not diaphoretic.   HENT:      Head: Normocephalic and atraumatic.      Right Ear: Hearing and external ear normal. No decreased hearing noted.      Left Ear: Hearing and external ear normal. No decreased hearing noted.      Nose: Nose normal.      Mouth/Throat:      Mouth: Mucous membranes are moist. No oral lesions.      Tongue: No lesions.      Palate: No mass and lesions.      Pharynx: Oropharynx is clear. Uvula midline.   Eyes:      General: Lids are normal.         Right eye: No discharge.         Left eye: No discharge.      Conjunctiva/sclera: Conjunctivae normal.      Pupils: Pupils are equal, round, and reactive to light.   Neck:      Thyroid: No thyroid mass or thyromegaly.      Trachea: Trachea and phonation normal. No tracheal tenderness or tracheal deviation.   Cardiovascular:      Heart sounds: Normal heart sounds.   Pulmonary:      Breath sounds: Normal breath sounds. No stridor.   Abdominal:      Palpations: Abdomen is soft.   Musculoskeletal:      Cervical back: Normal range of motion and neck supple. No edema or erythema.   Lymphadenopathy:      Cervical: No cervical adenopathy.   Skin:     General: Skin is warm and dry.      Coloration: Skin is not pale.      Findings: No erythema or rash.    Neurological:      Mental Status: She is alert and oriented to person, place, and time.        US-thyroid 5/6/24:  FINDINGS:  Right lobe of the thyroid measures 4.8 x 1.3 x 2.3 cm.  Left lobe of the thyroid measures 5.2 x 1.1 x 1.1 cm.  Normal background parenchyma and perfusion.     3.0 x 1.0 x 1.8 cm solid isoechoic nodule in the midpole of the right lobe consistent with a TR 3 nodule meeting criteria for fine-needle aspiration.     A few additional subcentimeter nodules bilaterally measuring up to 0.4 cm, none of which meet criteria for follow-up or fine-needle aspiration.     No suspicious lymph nodes.     Impression:     Right TR 3 thyroid nodule meeting criteria for fine-needle aspiration.    Assessment     1. Thyroid nodule          Plan  In summary, Ms. Manriquez is a 64 year old female with right thyroid nodule meeting criteria for FNA. We discussed how common thyroid nodules are in the general population. However, I explained that the vast majority of thyroid nodules are not malignant and that there are certain steps that we have to take to evaluate thyroid nodules for malignancy. She has had an ultrasound, but she now needs an ultrasound-guided FNA, which we have set up through radiology. Because she is euthyroid, there is no need for a radioactive thyroid scan.    I then discussed with the patient the potential outcomes of the fine-needle aspiration: malignant, suspicious for malignancy, follicular lesion/suspicious for follicular lesion, atypia of uncertain significance/FLUS, or benign (in addition to nondiagnostic).  Recommendations for treatment are predicated on this result.  In general benign lesions can be followed with intermittent ultrasound.  My recommendation for malignant or suspicious lesions is total thyroidectomy, potentially with radioactive iodine remnant ablation in the setting of malignancy.  Follicular lesions merit thyroid lobectomy or total thyroidectomy, depending on the specific  clinical picture or further molecular studies. In lower risk cases or in patients who are disinclined to pursue surgery or are poor surgical candidates, interval ultrasound is also reasonable. Similarly, either repeat ultrasound-guided needle biopsy or surgery is a reasonable course of action for the indeterminate biopsy result, again depending on the specific clinical picture.  Time was allowed for questions, and all questions were answered to the patient's apparent satisfaction.

## 2024-05-28 ENCOUNTER — PATIENT MESSAGE (OUTPATIENT)
Dept: OTOLARYNGOLOGY | Facility: CLINIC | Age: 64
End: 2024-05-28
Payer: COMMERCIAL

## 2024-06-07 ENCOUNTER — TELEPHONE (OUTPATIENT)
Dept: INTERVENTIONAL RADIOLOGY/VASCULAR | Facility: HOSPITAL | Age: 64
End: 2024-06-07
Payer: COMMERCIAL

## 2024-06-07 NOTE — NURSING
Advised to arrive at 12:30, where to check in, no need to fast, may drive self, take meds as usual, bring current list of home meds. Confirmed two allergies and no blood thinner use.

## 2024-06-10 ENCOUNTER — PATIENT MESSAGE (OUTPATIENT)
Dept: INTERNAL MEDICINE | Facility: CLINIC | Age: 64
End: 2024-06-10
Payer: COMMERCIAL

## 2024-06-10 ENCOUNTER — HOSPITAL ENCOUNTER (OUTPATIENT)
Dept: INTERVENTIONAL RADIOLOGY/VASCULAR | Facility: HOSPITAL | Age: 64
Discharge: HOME | End: 2024-06-10
Attending: NURSE PRACTITIONER
Payer: COMMERCIAL

## 2024-06-10 VITALS
HEIGHT: 61 IN | HEART RATE: 80 BPM | TEMPERATURE: 98 F | WEIGHT: 126 LBS | SYSTOLIC BLOOD PRESSURE: 107 MMHG | RESPIRATION RATE: 18 BRPM | BODY MASS INDEX: 23.79 KG/M2 | DIASTOLIC BLOOD PRESSURE: 67 MMHG | OXYGEN SATURATION: 96 %

## 2024-06-10 DIAGNOSIS — E04.1 THYROID NODULE: ICD-10-CM

## 2024-06-10 PROCEDURE — 88177 CYTP FNA EVAL EA ADDL: CPT | Performed by: PATHOLOGY

## 2024-06-10 PROCEDURE — 10005 FNA BX W/US GDN 1ST LES: CPT | Mod: RT | Performed by: STUDENT IN AN ORGANIZED HEALTH CARE EDUCATION/TRAINING PROGRAM

## 2024-06-10 PROCEDURE — 88172 CYTP DX EVAL FNA 1ST EA SITE: CPT | Performed by: PATHOLOGY

## 2024-06-10 PROCEDURE — 88173 CYTOPATH EVAL FNA REPORT: CPT | Performed by: PATHOLOGY

## 2024-06-10 NOTE — PROCEDURES
Interventional Radiology Immediate Post-Procedure Note    Pre-Op Diagnosis: Thyroid Nodule  Post-Op Diagnosis: Same    Procedure:     Procedure performed by: Jenny Hanna PA-C  Assistants: None    Estimated Blood Loss: Minimal  Specimen Removed: Yes    Findings/description of procedure:  25 g FNA x 4 passes made through nodule in the R lobe.  Adequacy of specimen confirmed.    No immediate complications. Patient tolerated procedure well. Please see full dictated procedure report for additional details and recommendations.      Cindy Chava, PA-C [facility]

## 2024-06-10 NOTE — DISCHARGE SUMMARY
Interventional Radiology Short Stay Discharge Summary      Admit Date: 6/10/2024  Discharge Date: 06/10/2024     Hospital Course: Uneventful    Discharge Diagnosis: multinodular thyroid    Discharge Condition: Stable    Discharge Disposition: Home    Diet: Resume prior diet    Activity: activity as tolerated    Follow-up: With referring provider    Cindy Chava, PA-C [facility]

## 2024-06-10 NOTE — H&P
Interventional Radiology Pre-Procedure History & Physical      Chief Complaint/Reason for Referral: thyroid nodule    History of Present Illness:  Carmen Manriquez is a 64 y.o. female who presents for thyroid nodule FNA    Past Medical History:   Diagnosis Date    Abnormal Pap smear     Allergy     Endometriosis, moderate     GERD (gastroesophageal reflux disease)     Heart palpitations      Past Surgical History:   Procedure Laterality Date    BILE DUCT EXPLORATION      BREAST BIOPSY Left     [place]     SECTION, CLASSIC      CHOLECYSTECTOMY      ENDOMETRIAL ABLATION      hx benign breast biopsy      leep      TONSILLECTOMY      TUBAL LIGATION         Allergies:   Review of patient's allergies indicates:   Allergen Reactions    Ciprofloxacin Hives    Sulfa (sulfonamide antibiotics) Other (See Comments)     Cp        Home Meds:   Prior to Admission medications    Medication Sig Start Date End Date Taking? Authorizing Provider   conjugated estrogens (PREMARIN) vaginal cream Apply pea-sized amount nightly for 2 weeks then twice a week there after 23  Yes Estrellita Alba MD   cyanocobalamin, vitamin B-12, 1,000 mcg TbSR Take 1 tablet by mouth once daily. 22  Yes Edwige Addison MD   ergocalciferol (ERGOCALCIFEROL) 50,000 unit Cap Take 1 capsule (50,000 Units total) by mouth every 7 days. 22  Yes Edwige Addison MD   EScitalopram oxalate (LEXAPRO) 10 MG tablet Take 1 tablet (10 mg total) by mouth once daily. 24 Yes Edwige Addison MD   gabapentin (NEURONTIN) 300 MG capsule Take 1 capsule (300 mg total) by mouth 3 (three) times daily. 23 Yes Nima Stephens MD   LORazepam (ATIVAN) 0.5 MG tablet Take 1 tablet (0.5 mg total) by mouth nightly as needed for anxiety/insomnia 10/24/23   Edwige Addison MD       Anticoagulation/Antiplatelet Meds: no anticoagulation    Review of Systems:   Hematological: no known  coagulopathies  Respiratory: no shortness of breath  Cardiovascular: no chest pain  Gastrointestinal: no abdominal pain  Genitourinary: no dysuria  Musculoskeletal: negative  Neurological: no TIA or stroke symptoms     Physical Exam:  Temp: 97.5 °F (36.4 °C) (06/10/24 1256)  Pulse: 80 (06/10/24 1256)  Resp: 18 (06/10/24 1256)  BP: 107/67 (06/10/24 1256)  SpO2: 96 % (06/10/24 1256)    General: WNWD, NAD  HEENT: Normocephalic, sclera anicteric  Neck: Supple  Heart: RRR by pulse  Lungs: Symmetric excursions, breathing unlabored  Abd: Nondistended  Extremities: MAEW  Neuro: AA x 3    Laboratory:  Lab Results   Component Value Date    INR 0.9 04/01/2013       Lab Results   Component Value Date    WBC 6.58 04/26/2024    HGB 14.2 04/26/2024    HCT 44.6 04/26/2024    MCV 94 04/26/2024     04/26/2024      Lab Results   Component Value Date    GLU 87 04/26/2024     04/26/2024    K 4.4 04/26/2024     04/26/2024    CO2 27 04/26/2024    BUN 6 (L) 04/26/2024    CREATININE 0.7 04/26/2024    CALCIUM 9.8 04/26/2024    ALT 13 04/26/2024    AST 19 04/26/2024    ALBUMIN 4.1 04/26/2024    BILITOT 0.4 04/26/2024    BILIDIR 0.1 12/02/2020       Imaging:  US 5/6 reviewed.    Assessment/Plan:  64 y.o. female with R thyroid nodule. Will undergo FNA today.    Sedation plan: None    Risks (including, but not limited to, pain, bleeding, infection, damage to nearby structures, treatment failure/recurrence, and the need for additional procedures), potential benefits, and alternatives were discussed with the patient. All questions were answered to the best of my abilities. The patient wishes to proceed. Written informed consent was obtained.    Cindy Chava, PA-C [facility]

## 2024-06-12 LAB
ADEQUACY: ABNORMAL
FINAL PATHOLOGIC DIAGNOSIS: ABNORMAL
Lab: ABNORMAL

## 2024-06-18 ENCOUNTER — PATIENT MESSAGE (OUTPATIENT)
Dept: PAIN MEDICINE | Facility: CLINIC | Age: 64
End: 2024-06-18
Payer: COMMERCIAL

## 2024-06-18 DIAGNOSIS — G57.40 TIBIAL NEUROPATHY, UNSPECIFIED LATERALITY: Primary | ICD-10-CM

## 2024-06-19 NOTE — TELEPHONE ENCOUNTER
Great. Can we reschedule her for the injection at her convenience?   Can you also let her know that it looks like they would approve the procedure as well?    Procedure is diagnostic and therapeutic b/l posterior tibial nerve block. CPT 06711, [facility] Triamcinalone.

## 2024-06-19 NOTE — TELEPHONE ENCOUNTER
Spoke to patient. B/L posterior nerve block scheduled as ordered, patient verbalized an understanding on below information. [facility] orders pending.

## 2024-06-28 ENCOUNTER — CLINICAL SUPPORT (OUTPATIENT)
Dept: PAIN MEDICINE | Facility: CLINIC | Age: 64
End: 2024-06-28
Payer: COMMERCIAL

## 2024-06-28 VITALS
BODY MASS INDEX: 23.81 KG/M2 | HEIGHT: 61 IN | SYSTOLIC BLOOD PRESSURE: 124 MMHG | HEART RATE: 73 BPM | WEIGHT: 126.13 LBS | DIASTOLIC BLOOD PRESSURE: 82 MMHG

## 2024-06-28 DIAGNOSIS — G57.40 TIBIAL NEUROPATHY, UNSPECIFIED LATERALITY: Primary | ICD-10-CM

## 2024-06-28 PROCEDURE — 99999 PR PBB SHADOW E&M-EST. PATIENT-LVL III: CPT | Mod: PBBFAC,,, | Performed by: STUDENT IN AN ORGANIZED HEALTH CARE EDUCATION/TRAINING PROGRAM

## 2024-06-28 RX ORDER — TRIAMCINOLONE ACETONIDE 40 MG/ML
40 INJECTION, SUSPENSION INTRA-ARTICULAR; INTRAMUSCULAR
Status: COMPLETED | OUTPATIENT
Start: 2024-06-28 | End: 2024-06-28

## 2024-06-28 RX ADMIN — TRIAMCINOLONE ACETONIDE 40 MG: 40 INJECTION, SUSPENSION INTRA-ARTICULAR; INTRAMUSCULAR at 08:06

## 2024-06-28 NOTE — PROCEDURES
"Procedures  PROCEDURE:  bilateral Posterior tibial Nerve Block With Ultrasound Guidance    Diagnosis: Tibial neuropathy  CPT code:  50037, 20509 (Ultrasonic guidance for needle placement imaging supervision and interpretation)    Injection # 1 this year.    Postprocedural Diagnosis: Same  Needle Type: - 27G 1.5" needle    Solution injected: A 4ml mixture of 0.25% Bupivacaine and 40mg Kenalog    Estimated Blood Loss - <2ml  Drains: None  Specimens Removed: None  Urine Output - Not Measured  Complications: None  Outcome: Good  VAS Before Injection:  7/10  VAS After Injection:  7/10    Informed Consent:  The patient's condition and proposed procedures, risks (including complications of nerve damage,  bleeding, infection, and failure of pain relief), and alternatives were discussed with the patient or responsible party.  The patient's/responsible party's questions were answered.  The patient/responsible party appeared to understand and chose to proceed.  Informed consent was obtained.    PROCEDURE IN DETAIL:   The procedure was performed in the procedure treatment room with ultrasound. The patient was positioned appropriately.     The target site of injection was identified using the ultrasound in the short axis view. Any vascular structure located close to the injection site were also noted. The skin overlying the target site of injection was prepped and draped in an aseptic fashion.  The skin entry site was identified and marked.     Procedural Pause:  A procedural pause verifying correct patient, medical record number, allergies, medications to be administered, current vital signs, and surgical site was performed immediately prior to beginning the procedure.    The posterior tibial nerve and surrounding structures (structures identified: medial malleous, achilles tendon, tibial artery) were identified using ultrasound guidance in a short-axis view at the ankle. The skin and subcutaneous tissue overlying the target " site of injection was not anesthetized using 0 ml of 1% lidocaine MPF with a 27-gauge, 1½ -inch needle. The above noted block needle was inserted and advanced under live US guidance using an in-plane view, keeping the needle perpendicular to the beam, with an endpoint near the tibial nerve. There was no evidence of heme or paresthesia. Subsequently, the injectate mixture was slowly and incrementally injected without resistance. The needle was then flushed and withdrawn. The needle was then retracted.    This WAS repeat on the opposite side    All sites were done in the same manner. The patient tolerated the procedure well and without complications. After meeting discharge criteria, the patient was discharged home safely.    DISCUSSION:  A posterior tibial nerve block was performed today to treat the patients myofascial pain. The purpose was to improve the patients function and decrease pain. We have reminded the patient that all injections must be done in conjunction with a stretching and exercise program.  Without a exercise program, results from these injections are often suboptimal. The patient was advised to relax and avoid any heavy lifting or excessive bending for 24 hours. She was advised that she may return to her usual activities after 24 hours if she is otherwise feeling well.  The patient was advised not to bathe or soak in water for 24 hours but that showering would be acceptable.      Note Electronically Signed By:  James Haney  06/28/2024

## 2024-06-28 NOTE — PROGRESS NOTES
"HPI  Patient presenting for b/l posterior tibial nerve blocks     Patient on Anti-coagulation No    No health changes since previous encounter    Past Medical History:   Diagnosis Date    Abnormal Pap smear     Allergy     Endometriosis, moderate     GERD (gastroesophageal reflux disease)     Heart palpitations      Past Surgical History:   Procedure Laterality Date    BILE DUCT EXPLORATION      BREAST BIOPSY Left 2015    [place]     SECTION, CLASSIC      CHOLECYSTECTOMY      ENDOMETRIAL ABLATION      hx benign breast biopsy      leep      TONSILLECTOMY      TUBAL LIGATION       Review of patient's allergies indicates:   Allergen Reactions    Ciprofloxacin Hives    Sulfa (sulfonamide antibiotics) Other (See Comments)     Cp       Current Outpatient Medications   Medication Sig    conjugated estrogens (PREMARIN) vaginal cream Apply pea-sized amount nightly for 2 weeks then twice a week there after    cyanocobalamin, vitamin B-12, 1,000 mcg TbSR Take 1 tablet by mouth once daily.    ergocalciferol (ERGOCALCIFEROL) 50,000 unit Cap Take 1 capsule (50,000 Units total) by mouth every 7 days.    EScitalopram oxalate (LEXAPRO) 10 MG tablet Take 1 tablet (10 mg total) by mouth once daily.    gabapentin (NEURONTIN) 300 MG capsule Take 1 capsule (300 mg total) by mouth 3 (three) times daily.    LORazepam (ATIVAN) 0.5 MG tablet Take 1 tablet (0.5 mg total) by mouth nightly as needed for anxiety/insomnia     Current Facility-Administered Medications   Medication    triamcinolone acetonide injection 40 mg       PMHx, PSHx, Allergies, Medications reviewed in epic    ROS negative except pain complaints in HPI    OBJECTIVE:    /82 (BP Location: Left arm, Patient Position: Sitting, BP Method: Small (Automatic))   Pulse 73   Ht 5' 1" (1.549 m)   Wt 57.2 kg (126 lb 1.7 oz)   BMI 23.83 kg/m²     PHYSICAL EXAMINATION:    GENERAL: Well appearing, in no acute distress, alert and oriented x3.  PSYCH:  Mood and affect " appropriate.  SKIN: Skin color, texture, turgor normal, no rashes or lesions which will impact the procedure.  CV: RRR with palpation of the radial artery.  PULM: No evidence of respiratory difficulty, symmetric chest rise. Clear to auscultation.  NEURO: Cranial nerves grossly intact.    Plan:    Proceed with procedure as planned    James Haney  06/28/2024

## 2024-07-03 ENCOUNTER — PATIENT MESSAGE (OUTPATIENT)
Dept: PAIN MEDICINE | Facility: CLINIC | Age: 64
End: 2024-07-03
Payer: COMMERCIAL

## 2024-07-03 ENCOUNTER — PATIENT MESSAGE (OUTPATIENT)
Dept: OTOLARYNGOLOGY | Facility: CLINIC | Age: 64
End: 2024-07-03
Payer: COMMERCIAL

## 2024-07-26 ENCOUNTER — OFFICE VISIT (OUTPATIENT)
Dept: PAIN MEDICINE | Facility: CLINIC | Age: 64
End: 2024-07-26
Payer: COMMERCIAL

## 2024-07-26 VITALS
HEART RATE: 80 BPM | SYSTOLIC BLOOD PRESSURE: 125 MMHG | BODY MASS INDEX: 23.81 KG/M2 | WEIGHT: 126.13 LBS | DIASTOLIC BLOOD PRESSURE: 83 MMHG | HEIGHT: 61 IN

## 2024-07-26 DIAGNOSIS — G57.40 TIBIAL NEUROPATHY, UNSPECIFIED LATERALITY: ICD-10-CM

## 2024-07-26 DIAGNOSIS — G90.523 COMPLEX REGIONAL PAIN SYNDROME TYPE 1 OF BOTH LOWER EXTREMITIES: ICD-10-CM

## 2024-07-26 PROCEDURE — 99999 PR PBB SHADOW E&M-EST. PATIENT-LVL III: CPT | Mod: PBBFAC,,, | Performed by: STUDENT IN AN ORGANIZED HEALTH CARE EDUCATION/TRAINING PROGRAM

## 2024-07-26 RX ORDER — PREGABALIN 50 MG/1
50 CAPSULE ORAL 3 TIMES DAILY
Qty: 270 CAPSULE | Refills: 1 | Status: SHIPPED | OUTPATIENT
Start: 2024-07-26 | End: 2025-01-22

## 2024-07-26 NOTE — PROGRESS NOTES
Chronic Pain - f/u    Referring Physician: No ref. provider found    Date: 07/26/2024     Re: Carmen Manriquez  MR#: [id number]  YOB: 1960  Age: 64 y.o.    Chief Complaint:   Chief Complaint   Patient presents with    Foot Pain     **This note is dictated using the M*Modal Fluency Direct word recognition program. There are word recognition mistakes that are occasionally missed on review.**    ASSESSMENT: 64 y.o. year old female with foot pain, consistent with     1. Tibial neuropathy, unspecified laterality  pregabalin (LYRICA) 50 MG capsule      2. Complex regional pain syndrome type 1 of both lower extremities  pregabalin (LYRICA) 50 MG capsule    Ambulatory referral/consult to Psychology        PLAN:     CRPS Type 1 of the bilateral feet  -patient has somewhat confusing symptoms, but most things have now been ruled out.  It is not plantar fascitis, not a radiculopathy, not a isolated nerve injury.  Has pain out of proportion, allodynia, and subjective weakness.  She does not have many sympathetic symptoms but she does not have a better diagnosis that CRPS.   -try stopping gabapentin and switching to Lyrica  -we discussed PNS vs DRG stimulation for longer lasting relief.  She is interested in this  -will increase Lyrica as tolerated  -schedule referral to psych for stimulator testing    Bilateral plantar foot pain  -unclear etiology. Patient has been seeing ortho foot and podiatry without much improvement  -sounds like plantar fasciiitis, but imaging and treatments have not been effective. Podiatry does not think this is plantar fascitiis.  -A b/l S1 DRG stimulation may be an option, but much more invasive  6/28/24 - b/l posterior tibial nerve block - very helpful for several days.  And somewhat helpful for several weeks.    Sacral radiculopathy  -less likely, but will order MRI lumbar to see if there is any evidence of compression of the S1 nerve root.  -MRI did not show any compression of the sciatic  nerve roots.  -EMG previously done was normal    - RTC April  - Counseled patient regarding the importance of activity modification.    The above plan and management options were discussed at length with patient. Patient is in agreement with the above and verbalized understanding. It will be communicated with the referring physician via electronic record, fax, or mail.  Lab/study reports reviewed were important and necessary because subsequent medical and treatment recommendations required review of the above lab/study reports. Images viewed/reviewed above were important and necessary because subsequent medical and treatment recommendations required review of the reviewed image(s).     Electronically signed by:  James De La Garza DO  07/26/2024    Patient was seen in clinic today.  The total amount of time spent on this patient was exactly 45 minutes.  Due to medical complexity, time spent with the patient, and multiple issues discussed/addressed I am billing for a level 5 visit. This includes face to face time and non-face to face time preparing to see the patient by reviewing previous labs/imaging, obtaining and/or reviewing separately obtained history, documenting clinical information in the electronic or other health record, independently reviewing results and communicating results to the patient/family/caregiver/additional providers.  The available imaging was reviewed in person with the patient, pathology and treatment options were explained in detail with the patient.  The patient was given multiple opportunities to ask questions, and all questions were answered.    =========================================================================================================    SUBJECTIVE:    Interval History 7/26/2024:   Carmen Manriquez is a 64 y.o. female presents to the clinic for follow up.  Since last visit the pain has is unchanged.    The pain is located in the bilateral feet area and does not radiate.   The pain is described as burning and bruised     At BEST  4/10   At WORST  10/10 on the WORST day.    On average pain is rated as 6/10.   Today the pain is rated as 4/10  Symptoms interfere with daily activity.   Exacerbating factors: Standing and Walking.    Mitigating factors nothing.     Current pain medications: gabapentin 300mg, cymbalta  Failed Pain Medications: medrol dose pop x2, voltaren, CBD creams, lidocaine ointment, salon pas    Pain procedures:  cortisone shots (made it worse)  6/28/24 - b/l posterior tibial nerve block - very helpful for a few days.    Initial hx:  Carmen Manriquez is a 64 y.o. female presents to the clinic for the evaluation of feet pain. The pain started years ago following no inciting event and symptoms have been worsening. The patient states that she has had 3 years of pain on the bottom of her feet.  It has just not been getting worse.  Some days are bad and some days are worse.  She has seen 2 podiatrists, an ortho foot doctor, ortho spine, specialized orthotics, EMG, MRI x2, foot X-rays and everything has been negative. She states it feels like plantar fascitis but the tests are all negative. Standing longer than 5 minutes is unbearable.  First thing in the morning with putting pressure is worse.  Walking is usually okay once she gets moving.    She gets burning in the feet about 1x/week    Pain Description:    The pain is located in the feet area and does not radiate.    At BEST  3/10   At WORST  7/10 on the WORST day.    On average pain is rated as 4/10.   Today the pain is rated as 4/10  The pain is continuous.  The pain is described as burning and tingling    Symptoms interfere with daily activity.   Exacerbating factors: Standing and Walking.    Mitigating factors nothing.   She reports 7 hours of sleep per night.    Physical Therapy/Home Exercise: Yes, currently has a home exercise program. Does lots of PT at her house for plantar fasciitis.     Current Pain  Medications:    - gabapentin, cymbalta    Failed Pain Medications:    - medrol dose pop x2    Pain Treatment Therapies:    Pain procedures:   cortisone shots (made it worse)  Physical Therapy: none  Chiropractor: yes  Acupuncture: yes  TENS unit: yes  Spinal decompression: none  Joint replacement: none    Patient denies urinary incontinence and bowel incontinence.  Patient denies any suicidal or homicidal ideations     report:  Reviewed and consistent with medication use as prescribed.    Imaging:   MRI ankle 2024:  1. Small subtalar and distal tibiofibular joint effusions and possible distal tibiofibular synovitis.     MRI foot Left/Right 2023:  1. Unremarkable appearance of the plantar fascia.         2024    11:17 AM 3/13/2024    12:58 PM   Pain Disability Index (PDI)   Family/Home Responsibilities: 6 4   Recreation: 6 4   Occupation: 6 4   Sexual Behavior: 6 4   Self Care: 6 4   Life-Support Activities: 6 4   Pain Disability Index (PDI) 42 28        Past Medical History:   Diagnosis Date    Abnormal Pap smear     Allergy     Endometriosis, moderate     GERD (gastroesophageal reflux disease)     Heart palpitations      Past Surgical History:   Procedure Laterality Date    BILE DUCT EXPLORATION      BREAST BIOPSY Left     Touro     SECTION, CLASSIC      CHOLECYSTECTOMY      ENDOMETRIAL ABLATION      hx benign breast biopsy      leep      TONSILLECTOMY      TUBAL LIGATION       Social History     Socioeconomic History    Marital status:     Number of children: 1   Occupational History    Occupation:      Employer: [employer]   Tobacco Use    Smoking status: Former     Current packs/day: 0.25     Average packs/day: 1 pack/day for 49.4 years (49.1 ttl pk-yrs)     Types: Cigarettes     Start date:      Quit date: 10/31/2023    Smokeless tobacco: Never    Tobacco comments:     smoker since age 14, used to smoke 1 ppd   Substance and Sexual Activity     Alcohol use: Yes     Alcohol/week: 4.0 standard drinks of alcohol     Types: 4 Cans of beer per week    Drug use: No    Sexual activity: Yes     Partners: Male     Birth control/protection: Post-menopausal   Social History Narrative    No exercise at present.     Social Determinants of Health     Financial Resource Strain: Low Risk  (11/28/2023)    Overall Financial Resource Strain (CARDIA)     Difficulty of Paying Living Expenses: Not hard at all   Food Insecurity: No Food Insecurity (11/28/2023)    Hunger Vital Sign     Worried About Running Out of Food in the Last Year: Never true     Ran Out of Food in the Last Year: Never true   Transportation Needs: No Transportation Needs (11/28/2023)    PRAPARE - Transportation     Lack of Transportation (Medical): No     Lack of Transportation (Non-Medical): No   Physical Activity: Inactive (11/28/2023)    Exercise Vital Sign     Days of Exercise per Week: 0 days     Minutes of Exercise per Session: 0 min   Stress: Stress Concern Present (11/28/2023)    [employer] [place] [employer] - Occupational Stress Questionnaire     Feeling of Stress : Rather much   Housing Stability: Low Risk  (11/28/2023)    Housing Stability Vital Sign     Unable to Pay for Housing in the Last Year: No     Number of Places Lived in the Last Year: 1     Unstable Housing in the Last Year: No     Family History   Problem Relation Name Age of Onset    Diabetes Mother      Cancer Brother 40         gallbladder ca    Alcohol abuse Father      Breast cancer Paternal Grandmother      Cancer Paternal Grandmother          breast    Cervical cancer Maternal Grandmother      Cancer Maternal Grandmother          cervical    Ovarian cancer Maternal Grandmother      Heart disease Maternal Uncle      Kidney disease Sister          nephrotic syndrome    No Known Problems Son      No Known Problems Sister      Colon cancer Neg Hx         Review of patient's allergies indicates:   Allergen Reactions     "Ciprofloxacin Hives    Sulfa (sulfonamide antibiotics) Other (See Comments)     Cp        Current Outpatient Medications   Medication Sig    cyanocobalamin, vitamin B-12, 1,000 mcg TbSR Take 1 tablet by mouth once daily.    ergocalciferol (ERGOCALCIFEROL) 50,000 unit Cap Take 1 capsule (50,000 Units total) by mouth every 7 days.    EScitalopram oxalate (LEXAPRO) 10 MG tablet Take 1 tablet (10 mg total) by mouth once daily.    LORazepam (ATIVAN) 0.5 MG tablet Take 1 tablet (0.5 mg total) by mouth nightly as needed for anxiety/insomnia    conjugated estrogens (PREMARIN) vaginal cream Apply pea-sized amount nightly for 2 weeks then twice a week there after    pregabalin (LYRICA) 50 MG capsule Take 1 capsule (50 mg total) by mouth 3 (three) times daily.     No current facility-administered medications for this visit.       REVIEW OF SYSTEMS:    GENERAL:  No weight loss, malaise or fevers.  HEENT:   No recent changes in vision or hearing  NECK:  Negative for lumps, no difficulty with swallowing.  RESPIRATORY:  Negative for cough, wheezing or shortness of breath, patient denies any recent URI.  CARDIOVASCULAR:  Negative for chest pain, leg swelling or palpitations.  GI:  Negative for abdominal discomfort, blood in stools or black stools or change in bowel habits.  MUSCULOSKELETAL:  See HPI.  SKIN:  Negative for lesions, rash, and itching.  PSYCH:  No mood disorder or recent psychosocial stressors.  Patients sleep is not disturbed secondary to pain.  HEMATOLOGY/LYMPHOLOGY:  Negative for prolonged bleeding, bruising easily or swollen nodes.  Patient is not currently taking any anti-coagulants  NEURO:   No history of headaches, syncope, paralysis, seizures or tremors.  All other reviewed and negative other than HPI.    OBJECTIVE:    /83 (BP Location: Left arm, Patient Position: Sitting)   Pulse 80   Ht 5' 1" (1.549 m)   Wt 57.2 kg (126 lb 1.7 oz)   BMI 23.83 kg/m²     PHYSICAL EXAMINATION:    GENERAL: Well " appearing, in no acute distress, alert and oriented x3.  PSYCH:  Mood and affect appropriate.  SKIN: Skin color, texture, turgor normal, no rashes or lesions.  HEAD/FACE:  Normocephalic, atraumatic. Cranial nerves grossly intact.  CV: RRR with palpation of the radial artery.  PULM: CTAB. No evidence of respiratory difficulty, symmetric chest rise.  GI:  Soft and non-tender.    BACK:   - No obvious deformity or signs of trauma, Normal lumbar lordotic curve  - Negative spinous process tenderness  - Negative paravertebral tenderness  - Positive pain to palpation over the facet joints of the lumbar spine.   - Negative QL / Iliac crest / Glut tenderness  - Slump test is Negative for radicular pain  - Slump test is Negative for back pain  - Supine Straight leg raising is Negative for radicular pain  - Supine Straight leg raising is Negative for back pain  - Lumbar ROM is normal in Flexion without pain  - Lumbar ROM is normal in Extension without pain  - Did not perform Sustained Hip Flexion test (for discogenic pain)  - Positive Altered Gait, Posture  - Axial facet loading test Negative on the bilateral side(s)    MUSKULOSKELETAL:    EXTREMITIES:   B/l Feet:  TTP bilateral midfoot and forefoot. Increased sensitivity to light pressure.  (+) numbness in the b/l plantar foot    NEUROLOGICAL EXAM:  MENTAL STATUS: A x O x 3, good concentration, speech is fluent and goal directed  MEMORY: recent and remote are intact  CN: CN2-12 grossly intact  MOTOR: 5/5 in all muscle groups  DTRs: 2+ intact symmetric  Sensation:    -yes Loss of sensation in a left lower and right lower  plantar foot  distribution.  Babinski: absent on the bilateral side(s)

## 2024-07-26 NOTE — PATIENT INSTRUCTIONS
Psychological Testing Details for Spinal Cord Stimulators, Peripheral Nerve Stimulators, and Basivertebral Nerve Ablation

## 2024-07-31 ENCOUNTER — CLINICAL SUPPORT (OUTPATIENT)
Dept: PSYCHIATRY | Facility: CLINIC | Age: 64
End: 2024-07-31
Payer: COMMERCIAL

## 2024-07-31 DIAGNOSIS — Z00.8 ENCOUNTER FOR PRE-SURGICAL PSYCHOLOGICAL ASSESSMENT: Primary | ICD-10-CM

## 2024-08-05 ENCOUNTER — OFFICE VISIT (OUTPATIENT)
Dept: PSYCHIATRY | Facility: CLINIC | Age: 64
End: 2024-08-05
Payer: COMMERCIAL

## 2024-08-05 DIAGNOSIS — G90.523 COMPLEX REGIONAL PAIN SYNDROME TYPE 1 OF BOTH LOWER EXTREMITIES: ICD-10-CM

## 2024-08-05 DIAGNOSIS — Z00.8 ENCOUNTER FOR PRE-SURGICAL PSYCHOLOGICAL ASSESSMENT: Primary | ICD-10-CM

## 2024-08-05 PROCEDURE — 96131 PSYCL TST EVAL PHYS/QHP EA: CPT | Mod: S$GLB,,,

## 2024-08-05 PROCEDURE — 90791 PSYCH DIAGNOSTIC EVALUATION: CPT | Mod: S$GLB,,,

## 2024-08-05 PROCEDURE — 96139 PSYCL/NRPSYC TST TECH EA: CPT | Mod: S$GLB,,,

## 2024-08-05 PROCEDURE — 96130 PSYCL TST EVAL PHYS/QHP 1ST: CPT | Mod: S$GLB,,,

## 2024-08-05 PROCEDURE — 96138 PSYCL/NRPSYC TECH 1ST: CPT | Mod: S$GLB,,,

## 2024-08-05 PROCEDURE — 3044F HG A1C LEVEL LT 7.0%: CPT | Mod: CPTII,S$GLB,,

## 2024-08-05 PROCEDURE — 99999 PR PBB SHADOW E&M-EST. PATIENT-LVL I: CPT | Mod: PBBFAC,,,

## 2024-08-12 ENCOUNTER — PATIENT MESSAGE (OUTPATIENT)
Dept: PAIN MEDICINE | Facility: CLINIC | Age: 64
End: 2024-08-12
Payer: COMMERCIAL

## 2024-08-22 ENCOUNTER — PATIENT MESSAGE (OUTPATIENT)
Dept: PAIN MEDICINE | Facility: CLINIC | Age: 64
End: 2024-08-22
Payer: COMMERCIAL

## 2024-09-18 ENCOUNTER — NURSE TRIAGE (OUTPATIENT)
Dept: ADMINISTRATIVE | Facility: CLINIC | Age: 64
End: 2024-09-18
Payer: COMMERCIAL

## 2024-09-18 ENCOUNTER — ON-DEMAND VIRTUAL (OUTPATIENT)
Dept: URGENT CARE | Facility: CLINIC | Age: 64
End: 2024-09-18
Payer: COMMERCIAL

## 2024-09-18 DIAGNOSIS — K11.20 SIALADENITIS: Primary | ICD-10-CM

## 2024-09-18 PROCEDURE — 99213 OFFICE O/P EST LOW 20 MIN: CPT | Mod: 95,,, | Performed by: NURSE PRACTITIONER

## 2024-09-18 RX ORDER — DOXYCYCLINE 100 MG/1
100 CAPSULE ORAL 2 TIMES DAILY
Qty: 20 CAPSULE | Refills: 0 | Status: SHIPPED | OUTPATIENT
Start: 2024-09-18 | End: 2024-09-28

## 2024-09-18 NOTE — TELEPHONE ENCOUNTER
calling with pt nearby, states prior to call pt had odd, bitter taste in mouth followed by right sided neck swelling that she reports is a lymph node or gland. Denies facial swelling, fever, difficulty breathing or swallowing. Advised to be seen within 24 hours per protocol, ODVV offered and accepted.    Reason for Disposition   [1] Single large node AND [2] size > 1 inch (2.5 cm) AND [3] no fever    Additional Information   Negative: SEVERE difficulty breathing (e.g., struggling for each breath, speaks in single words)   Negative: [1] Node is in the neck AND [2] causes difficulty breathing   Negative: [1] Node is in the neck AND [2] can't swallow fluids   Negative: Fever > 103 F (39.4 C)   Negative: [1] Lump or swelling in groin AND [2] pulsating (like heartbeat)   Negative: Patient sounds very sick or weak to the triager   Negative: [1] Single large node AND [2] size > 1 inch (2.5 cm) AND [3] fever   Negative: [1] Overlying skin is red AND [2] fever    Protocols used: Lymph Nodes - [id number]

## 2024-09-18 NOTE — PROGRESS NOTES
Subjective:      Patient ID: Carmen Manriquez is a 64 y.o. female.    Vitals:  vitals were not taken for this visit.     Chief Complaint: Mass      Visit Type: TELE AUDIOVISUAL - This visit was conducted virtually based on  subjective information and limited objective exam    Present with the patient at the time of consultation: TELEMED PRESENT WITH PATIENT: None        Past Medical History:   Diagnosis Date    Abnormal Pap smear     Allergy     Endometriosis, moderate     GERD (gastroesophageal reflux disease)     Heart palpitations      Past Surgical History:   Procedure Laterality Date    BILE DUCT EXPLORATION      BREAST BIOPSY Left     Touro     SECTION, CLASSIC      CHOLECYSTECTOMY      ENDOMETRIAL ABLATION      hx benign breast biopsy      leep      TONSILLECTOMY      TUBAL LIGATION       Review of patient's allergies indicates:   Allergen Reactions    Ciprofloxacin Hives    Sulfa (sulfonamide antibiotics) Other (See Comments)     Cp      Current Outpatient Medications on File Prior to Visit   Medication Sig Dispense Refill    cyanocobalamin, vitamin B-12, 1,000 mcg TbSR Take 1 tablet by mouth once daily. 90 each 0    ergocalciferol (ERGOCALCIFEROL) 50,000 unit Cap Take 1 capsule (50,000 Units total) by mouth every 7 days. 12 capsule 4    EScitalopram oxalate (LEXAPRO) 10 MG tablet Take 1 tablet (10 mg total) by mouth once daily. 90 tablet 1    LORazepam (ATIVAN) 0.5 MG tablet Take 1 tablet (0.5 mg total) by mouth nightly as needed for anxiety/insomnia 30 tablet 3    pregabalin (LYRICA) 50 MG capsule Take 1 capsule (50 mg total) by mouth 3 (three) times daily. 270 capsule 1     No current facility-administered medications on file prior to visit.     Family History   Problem Relation Name Age of Onset    Diabetes Mother      Cancer Brother 40         gallbladder ca    Alcohol abuse Father      Breast cancer Paternal Grandmother      Cancer Paternal Grandmother          breast    Cervical cancer  Maternal Grandmother      Cancer Maternal Grandmother          cervical    Ovarian cancer Maternal Grandmother      Heart disease Maternal Uncle      Kidney disease Sister          nephrotic syndrome    No Known Problems Son      No Known Problems Sister      Colon cancer Neg Hx         Medications Ordered                [facility] - [address]   [address] [address]    Telephone: [phone]   Fax: [phone]   Hours: Not open 24 hours                         E-Prescribed (1 of 1)              doxycycline (VIBRAMYCIN) 100 MG Cap    Sig: Take 1 capsule (100 mg total) by mouth 2 (two) times daily. for 10 days       Start: 9/18/24     Quantity: 20 capsule Refills: 0                           Ohs Peq Odvv Intake    9/18/2024  5:41 PM CDT - Filed by Patient   What is your current physical address in the event of a medical emergency? [address] [place] [id number]   Are you able to take your vital signs? Yes   Systolic Blood Pressure: 137   Diastolic Blood Pressure: 80   Weight: 128   Height: 61   Pulse: 95   Temperature: 98.6   Respiration rate:    Pulse Oxygen: 96   Please attach any relevant images or files          63 yo female with c/o right sided gland swollen . She states she was eating saurkraut and suddenly clogged up. She states swelling behind ear and jawline. She states area is painful. No redness.   She states she took 800 mg ibprofen.   visualized decreased salivation when patient lifting tongue for a period of time.       ROS     Objective:   The physical exam was conducted virtually.  LOCATION OF PATIENT [facility]  AAO x 3 ; no acute distress noted; appearance normal; mood and behavior normal; thought process normal  Head- normocephalic  Nose- appears normal, no discharge or erythema  Eyes- pupils appear normal in size, no drainage, no erythema  Ears- normal appearing; no discharge, no erythema  Mouth- appears normal  Oropharynx- no  erythema, lesions  Lungs- breathing at a normal rate, no acute distress noted  Heart- no reports of tachycardia, palpitations, chest pain  Abdomen- non distended, non tender reported by patient  Skin- warm and dry, no erythema or edema noted by patient or visualized  Psych- as above; no si/hi      Assessment:     1. Sialadenitis        Plan:     Differential  Salidentitis  Parotiditis  Other etiologies    Thank you for choosing [facility]!    Our goal in the [facility] is to always provide outstanding medical care. You may receive a survey by mail or e-mail in the next week regarding your experience today. We would greatly appreciate you completing and returning the survey. Your feedback provides us with a way to recognize our staff who provide very good care, and it helps us learn how to improve when your experience was below our aspiration of excellence.         We appreciate you trusting us with your medical care. We hope you feel better soon. We will be happy to take care of you for all of your future medical needs.    You must understand that you've received an Urgent Care treatment only and that you may be released before all your medical problems are known or treated. You, the patient, will arrange for follow up care as instructed.    Follow up with your PCP or specialty clinic as directed in the next 1-2 weeks if not improved or as needed.  You can call [phone] to schedule an appointment with the appropriate provider.    If your condition worsens we recommend that you receive another evaluation in person, with your primary care provider, urgent care or at the emergency room immediately or contact your primary medical clinics after hours call service to discuss your concerns.         Sialadenitis  -     doxycycline (VIBRAMYCIN) 100 MG Cap; Take 1 capsule (100 mg total) by mouth 2 (two) times daily. for 10 days  Dispense: 20 capsule; Refill: 0

## 2024-09-19 ENCOUNTER — PATIENT MESSAGE (OUTPATIENT)
Dept: INTERNAL MEDICINE | Facility: CLINIC | Age: 64
End: 2024-09-19

## 2024-09-19 ENCOUNTER — OFFICE VISIT (OUTPATIENT)
Dept: INTERNAL MEDICINE | Facility: CLINIC | Age: 64
End: 2024-09-19
Payer: COMMERCIAL

## 2024-09-19 VITALS
WEIGHT: 129.63 LBS | HEART RATE: 98 BPM | DIASTOLIC BLOOD PRESSURE: 84 MMHG | SYSTOLIC BLOOD PRESSURE: 120 MMHG | HEIGHT: 61 IN | OXYGEN SATURATION: 98 % | BODY MASS INDEX: 24.47 KG/M2

## 2024-09-19 DIAGNOSIS — K11.20 SIALADENITIS: Primary | ICD-10-CM

## 2024-09-19 DIAGNOSIS — E04.1 THYROID NODULE: ICD-10-CM

## 2024-09-19 PROCEDURE — 3044F HG A1C LEVEL LT 7.0%: CPT | Mod: CPTII,S$GLB,, | Performed by: NURSE PRACTITIONER

## 2024-09-19 PROCEDURE — 3074F SYST BP LT 130 MM HG: CPT | Mod: CPTII,S$GLB,, | Performed by: NURSE PRACTITIONER

## 2024-09-19 PROCEDURE — 1159F MED LIST DOCD IN RCRD: CPT | Mod: CPTII,S$GLB,, | Performed by: NURSE PRACTITIONER

## 2024-09-19 PROCEDURE — 3008F BODY MASS INDEX DOCD: CPT | Mod: CPTII,S$GLB,, | Performed by: NURSE PRACTITIONER

## 2024-09-19 PROCEDURE — 99213 OFFICE O/P EST LOW 20 MIN: CPT | Mod: S$GLB,,, | Performed by: NURSE PRACTITIONER

## 2024-09-19 PROCEDURE — 99999 PR PBB SHADOW E&M-EST. PATIENT-LVL III: CPT | Mod: PBBFAC,,, | Performed by: NURSE PRACTITIONER

## 2024-09-19 PROCEDURE — 3079F DIAST BP 80-89 MM HG: CPT | Mod: CPTII,S$GLB,, | Performed by: NURSE PRACTITIONER

## 2024-09-19 RX ORDER — DIAZEPAM 5 MG/1
TABLET ORAL
COMMUNITY
Start: 2024-07-19

## 2024-09-19 NOTE — PROGRESS NOTES
INTERNAL MEDICINE URGENT CARE NOTE    CHIEF COMPLAINT     Chief Complaint   Patient presents with    Edema     Neck        HPI     Carmen Manriquez is a 64 y.o. female who presents for an urgent visit today.    Here with c/o acute pain and swelling to the right neck and under the ear - was seen by virtual provider ravindra ([place]).   Called in doxy and thought it was possibly parotid inflammation     Past Medical History:  Past Medical History:   Diagnosis Date    Abnormal Pap smear     Allergy     Endometriosis, moderate     GERD (gastroesophageal reflux disease)     Heart palpitations        Home Medications:  Prior to Admission medications    Medication Sig Start Date End Date Taking? Authorizing Provider   cyanocobalamin, vitamin B-12, 1,000 mcg TbSR Take 1 tablet by mouth once daily. 12/21/22   Edwige Addison MD   doxycycline (VIBRAMYCIN) 100 MG Cap Take 1 capsule (100 mg total) by mouth 2 (two) times daily. for 10 days 9/18/24 9/28/24  Zenobia Rojas, FNP   ergocalciferol (ERGOCALCIFEROL) 50,000 unit Cap Take 1 capsule (50,000 Units total) by mouth every 7 days. 12/21/22   Edwige Addison MD   EScitalopram oxalate (LEXAPRO) 10 MG tablet Take 1 tablet (10 mg total) by mouth once daily. 4/8/24 4/8/25  Edwige Addison MD   LORazepam (ATIVAN) 0.5 MG tablet Take 1 tablet (0.5 mg total) by mouth nightly as needed for anxiety/insomnia 10/24/23   Edwige Addison MD   pregabalin (LYRICA) 50 MG capsule Take 1 capsule (50 mg total) by mouth 3 (three) times daily. 7/26/24 1/22/25  James De La Garza DO       Review of Systems:  Review of Systems   Constitutional:  Negative for chills and fever.   HENT:  Negative for congestion, postnasal drip, sinus pressure, sinus pain and sore throat.    Neurological:  Positive for headaches (to occiptal region and right side of head). Negative for dizziness and light-headedness.   Hematological:  Positive for adenopathy (see HPI).  "      Health Maintainence:   Immunizations:  Health Maintenance         Date Due Completion Date    HIV Screening Never done ---    TETANUS VACCINE Never done ---    Shingles Vaccine (1 of 2) Never done ---    RSV Vaccine (Age 60+ and Pregnant patients) (1 - 1-dose 60+ series) Never done ---    Mammogram 08/29/2024 8/29/2023    Override on 4/1/2015: Done (bx)    Override on 10/10/2013: Done (DIS per pt)    Influenza Vaccine (1) 09/01/2024 9/26/2023    Override on 11/4/2020: Done    COVID-19 Vaccine (3 - 2023-24 season) 09/01/2024 3/15/2022    LDCT Lung Screen 04/26/2025 4/26/2024    Cervical Cancer Screening 06/16/2025 6/16/2022    DEXA Scan 09/19/2025 9/19/2023    Lipid Panel 04/26/2029 4/26/2024    Colorectal Cancer Screening 04/05/2031 4/5/2021    Override on 3/3/2016: Done (colon dr. munguia normal)    Override on 3/10/2012: Done (very approx date per pt)             PHYSICAL EXAM     /84 (BP Location: Right arm, Patient Position: Sitting, BP Method: Medium (Manual))   Pulse 98   Ht 5' 1" (1.549 m)   Wt 58.8 kg (129 lb 10.1 oz)   SpO2 98%   BMI 24.49 kg/m²     Physical Exam  Constitutional:       Appearance: She is well-developed.   HENT:      Head: Normocephalic and atraumatic.      Right Ear: External ear normal. There is no impacted cerumen. Tympanic membrane is scarred.      Left Ear: Tympanic membrane normal. There is no impacted cerumen.      Nose: Nose normal. No congestion.      Mouth/Throat:      Mouth: Mucous membranes are moist. No oral lesions.   Eyes:      Extraocular Movements:      Right eye: Normal extraocular motion and no nystagmus.      Left eye: Normal extraocular motion and no nystagmus.      Pupils: Pupils are equal, round, and reactive to light.   Neck:      Thyroid: No thyroid mass.      Vascular: No carotid bruit.   Cardiovascular:      Rate and Rhythm: Normal rate and regular rhythm.   Pulmonary:      Effort: Pulmonary effort is normal.   Musculoskeletal:      Cervical back: " Normal range of motion. No rigidity or tenderness.   Lymphadenopathy:      Cervical: No cervical adenopathy.      Right cervical: No superficial, deep or posterior cervical adenopathy.     Left cervical: No superficial or deep cervical adenopathy.   Neurological:      Mental Status: She is alert and oriented to person, place, and time.         LABS     Lab Results   Component Value Date    HGBA1C 5.5 04/26/2024     CMP  Sodium   Date Value Ref Range Status   04/26/2024 141 136 - 145 mmol/L Final     Potassium   Date Value Ref Range Status   04/26/2024 4.4 3.5 - 5.1 mmol/L Final     Chloride   Date Value Ref Range Status   04/26/2024 106 95 - 110 mmol/L Final     CO2   Date Value Ref Range Status   04/26/2024 27 23 - 29 mmol/L Final     Glucose   Date Value Ref Range Status   04/26/2024 87 70 - 110 mg/dL Final     BUN   Date Value Ref Range Status   04/26/2024 6 (L) 8 - 23 mg/dL Final     Creatinine   Date Value Ref Range Status   04/26/2024 0.7 0.5 - 1.4 mg/dL Final     Calcium   Date Value Ref Range Status   04/26/2024 9.8 8.7 - 10.5 mg/dL Final     Total Protein   Date Value Ref Range Status   04/26/2024 7.2 6.0 - 8.4 g/dL Final     Albumin   Date Value Ref Range Status   04/26/2024 4.1 3.5 - 5.2 g/dL Final     Total Bilirubin   Date Value Ref Range Status   04/26/2024 0.4 0.1 - 1.0 mg/dL Final     Comment:     For infants and newborns, interpretation of results should be based  on gestational age, weight and in agreement with clinical  observations.    Premature Infant recommended reference ranges:  Up to 24 hours.............<8.0 mg/dL  Up to 48 hours............<12.0 mg/dL  3-5 days..................<15.0 mg/dL  6-29 days.................<15.0 mg/dL       Alkaline Phosphatase   Date Value Ref Range Status   04/26/2024 105 55 - 135 U/L Final     AST   Date Value Ref Range Status   04/26/2024 19 10 - 40 U/L Final     ALT   Date Value Ref Range Status   04/26/2024 13 10 - 44 U/L Final     Anion Gap   Date Value  Ref Range Status   04/26/2024 8 8 - 16 mmol/L Final     eGFR if    Date Value Ref Range Status   11/29/2021 >60.0 >60 mL/min/1.73 m^2 Final     eGFR if non    Date Value Ref Range Status   11/29/2021 >60.0 >60 mL/min/1.73 m^2 Final     Comment:     Calculation used to obtain the estimated glomerular filtration  rate (eGFR) is the CKD-EPI equation.        Lab Results   Component Value Date    WBC 6.58 04/26/2024    HGB 14.2 04/26/2024    HCT 44.6 04/26/2024    MCV 94 04/26/2024     04/26/2024     Lab Results   Component Value Date    CHOL 250 (H) 04/26/2024    CHOL 251 (H) 02/23/2023    CHOL 266 (H) 12/21/2022     Lab Results   Component Value Date    HDL 53 04/26/2024    HDL 52 02/23/2023    HDL 56 12/21/2022     Lab Results   Component Value Date    LDLCALC 174.0 (H) 04/26/2024    LDLCALC 176.2 (H) 02/23/2023    LDLCALC 180.2 (H) 12/21/2022     Lab Results   Component Value Date    TRIG 115 04/26/2024    TRIG 114 02/23/2023    TRIG 149 12/21/2022     Lab Results   Component Value Date    CHOLHDL 21.2 04/26/2024    CHOLHDL 20.7 02/23/2023    CHOLHDL 21.1 12/21/2022     Lab Results   Component Value Date    TSH 1.004 04/26/2024       ASSESSMENT/PLAN     Carmen Manriquez is a 64 y.o. female     Sialadenitis- resolved. Will cont hydration and sour candies. Will monitor     Thyroid nodule- stable. Reviewed thyroid u/s, will monitor       Follow up with PCP     Patient education provided from Maia. Patient was counseled on when and how to seek emergent care.       Kenna MARTINEZ, APRN, FNP-c   [facility]  11:29 AM

## 2024-09-20 ENCOUNTER — TELEPHONE (OUTPATIENT)
Dept: INTERNAL MEDICINE | Facility: CLINIC | Age: 64
End: 2024-09-20
Payer: COMMERCIAL

## 2024-09-20 NOTE — TELEPHONE ENCOUNTER
----- Message from Meghna Reno sent at 9/20/2024  7:32 AM CDT -----  Contact: [phone]  Pt is requesting this through the portal; please advise     Preferred Date Range: 9/19/2024 - 9/20/2024    Preferred Times: Any Time    Reason for visit: Office Visit    Comments:  swelling on my jaw feels like its moving down my neck This morning I woke up with a headache

## 2024-09-27 ENCOUNTER — CLINICAL SUPPORT (OUTPATIENT)
Dept: SMOKING CESSATION | Facility: CLINIC | Age: 64
End: 2024-09-27
Payer: COMMERCIAL

## 2024-09-27 DIAGNOSIS — F17.200 NICOTINE DEPENDENCE: Primary | ICD-10-CM

## 2024-09-27 PROCEDURE — 99999 PR PBB SHADOW E&M-EST. PATIENT-LVL I: CPT | Mod: PBBFAC,,,

## 2024-09-27 PROCEDURE — 99407 BEHAV CHNG SMOKING > 10 MIN: CPT | Mod: S$GLB,,, | Performed by: GENERAL PRACTICE

## 2024-09-27 NOTE — PROGRESS NOTES
Spoke with patient today in regard to smoking cessation progress 12 month telephone follow up, she states that she is tobacco free.  Patient states that she occasionally smokes a cigarette when experiencing higher levels of stress.   Commended patient on the accomplishments thus far.  Informed patient of benefit period, future follow up, and contact information if any further help or support is needed.  Will complete smart form for 12 month follow up on Quit attempt #3 and resolve episode.

## 2024-12-09 RX ORDER — ESCITALOPRAM OXALATE 10 MG/1
10 TABLET ORAL DAILY
Qty: 90 TABLET | Refills: 1 | Status: SHIPPED | OUTPATIENT
Start: 2024-12-09

## 2024-12-09 NOTE — TELEPHONE ENCOUNTER
Refill Decision Note   Carmen Manriquez  is requesting a refill authorization.  Brief Assessment and Rationale for Refill:  Approve     Medication Therapy Plan:         Comments:     Note composed:4:19 PM 12/09/2024

## 2024-12-09 NOTE — TELEPHONE ENCOUNTER
No care due was identified.  Health [facility] Embedded Care Due Messages. Reference number: [id number].   12/09/2024 9:45:42 AM CST

## 2025-01-26 NOTE — TELEPHONE ENCOUNTER
No care due was identified.  Health [facility] Embedded Care Due Messages. Reference number: [id number].   1/26/2025 8:03:38 AM CST

## 2025-01-27 RX ORDER — ESCITALOPRAM OXALATE 10 MG/1
10 TABLET ORAL
Qty: 90 TABLET | Refills: 0 | Status: SHIPPED | OUTPATIENT
Start: 2025-01-27

## 2025-01-27 NOTE — TELEPHONE ENCOUNTER
Refill Decision Note   Carmen Manriquez  is requesting a refill authorization.  Brief Assessment and Rationale for Refill:  Approve     Medication Therapy Plan:         Comments:     Note composed:3:07 AM 01/27/2025

## 2025-02-24 ENCOUNTER — PATIENT MESSAGE (OUTPATIENT)
Dept: PAIN MEDICINE | Facility: CLINIC | Age: 65
End: 2025-02-24
Payer: COMMERCIAL

## 2025-02-24 DIAGNOSIS — G90.523 COMPLEX REGIONAL PAIN SYNDROME TYPE 1 OF BOTH LOWER EXTREMITIES: ICD-10-CM

## 2025-02-24 DIAGNOSIS — G57.40 TIBIAL NEUROPATHY, UNSPECIFIED LATERALITY: ICD-10-CM

## 2025-02-24 RX ORDER — PREGABALIN 50 MG/1
100 CAPSULE ORAL 2 TIMES DAILY
Qty: 360 CAPSULE | Refills: 1 | Status: SHIPPED | OUTPATIENT
Start: 2025-02-24 | End: 2025-02-24 | Stop reason: SDUPTHER

## 2025-02-25 RX ORDER — PREGABALIN 50 MG/1
100 CAPSULE ORAL 2 TIMES DAILY
Qty: 360 CAPSULE | Refills: 1 | Status: SHIPPED | OUTPATIENT
Start: 2025-02-25 | End: 2025-08-24

## 2025-04-13 DIAGNOSIS — F41.9 ANXIETY: Primary | ICD-10-CM

## 2025-04-13 NOTE — TELEPHONE ENCOUNTER
No care due was identified.  Health [facility] Embedded Care Due Messages. Reference number: [id number].   4/13/2025 10:05:45 AM CDT

## 2025-04-13 NOTE — TELEPHONE ENCOUNTER
No care due was identified.  Health [facility] Embedded Care Due Messages. Reference number: [id number].   4/13/2025 10:05:14 AM CDT

## 2025-04-14 RX ORDER — ESCITALOPRAM OXALATE 10 MG/1
10 TABLET ORAL DAILY
Qty: 90 TABLET | Refills: 3 | Status: SHIPPED | OUTPATIENT
Start: 2025-04-14

## 2025-04-14 RX ORDER — LORAZEPAM 0.5 MG/1
0.5 TABLET ORAL NIGHTLY PRN
Qty: 30 TABLET | Refills: 3 | Status: SHIPPED | OUTPATIENT
Start: 2025-04-14

## 2025-04-25 ENCOUNTER — LAB VISIT (OUTPATIENT)
Dept: LAB | Facility: HOSPITAL | Age: 65
End: 2025-04-25
Attending: INTERNAL MEDICINE
Payer: MEDICARE

## 2025-04-25 ENCOUNTER — OFFICE VISIT (OUTPATIENT)
Dept: INTERNAL MEDICINE | Facility: CLINIC | Age: 65
End: 2025-04-25
Payer: MEDICARE

## 2025-04-25 VITALS
WEIGHT: 125 LBS | SYSTOLIC BLOOD PRESSURE: 118 MMHG | OXYGEN SATURATION: 98 % | DIASTOLIC BLOOD PRESSURE: 68 MMHG | HEIGHT: 61 IN | BODY MASS INDEX: 23.6 KG/M2 | HEART RATE: 80 BPM

## 2025-04-25 DIAGNOSIS — G47.00 INSOMNIA, UNSPECIFIED TYPE: ICD-10-CM

## 2025-04-25 DIAGNOSIS — M79.671 PAIN IN BOTH FEET: ICD-10-CM

## 2025-04-25 DIAGNOSIS — F17.200 TOBACCO USE DISORDER: ICD-10-CM

## 2025-04-25 DIAGNOSIS — E55.9 VITAMIN D DEFICIENCY: ICD-10-CM

## 2025-04-25 DIAGNOSIS — M79.672 PAIN IN BOTH FEET: ICD-10-CM

## 2025-04-25 DIAGNOSIS — E78.5 HYPERLIPIDEMIA, UNSPECIFIED HYPERLIPIDEMIA TYPE: ICD-10-CM

## 2025-04-25 DIAGNOSIS — R73.9 HYPERGLYCEMIA: ICD-10-CM

## 2025-04-25 DIAGNOSIS — Z12.31 ENCOUNTER FOR SCREENING MAMMOGRAM FOR BREAST CANCER: ICD-10-CM

## 2025-04-25 DIAGNOSIS — E04.1 THYROID NODULE: ICD-10-CM

## 2025-04-25 DIAGNOSIS — Z87.891 PERSONAL HISTORY OF NICOTINE DEPENDENCE: ICD-10-CM

## 2025-04-25 DIAGNOSIS — F41.9 ANXIETY: ICD-10-CM

## 2025-04-25 DIAGNOSIS — Z00.00 ANNUAL PHYSICAL EXAM: Primary | ICD-10-CM

## 2025-04-25 DIAGNOSIS — Z00.00 ANNUAL PHYSICAL EXAM: ICD-10-CM

## 2025-04-25 LAB
ABSOLUTE EOSINOPHIL (OHS): 0.08 K/UL
ABSOLUTE MONOCYTE (OHS): 0.56 K/UL (ref 0.3–1)
ABSOLUTE NEUTROPHIL COUNT (OHS): 3.42 K/UL (ref 1.8–7.7)
ALBUMIN SERPL BCP-MCNC: 4 G/DL (ref 3.5–5.2)
ALP SERPL-CCNC: 99 UNIT/L (ref 40–150)
ALT SERPL W/O P-5'-P-CCNC: 13 UNIT/L (ref 10–44)
ANION GAP (OHS): 8 MMOL/L (ref 8–16)
AST SERPL-CCNC: 23 UNIT/L (ref 11–45)
BASOPHILS # BLD AUTO: 0.06 K/UL
BASOPHILS NFR BLD AUTO: 1 %
BILIRUB SERPL-MCNC: 0.3 MG/DL (ref 0.1–1)
BUN SERPL-MCNC: 5 MG/DL (ref 8–23)
CALCIUM SERPL-MCNC: 9.6 MG/DL (ref 8.7–10.5)
CHLORIDE SERPL-SCNC: 103 MMOL/L (ref 95–110)
CHOLEST SERPL-MCNC: 257 MG/DL (ref 120–199)
CHOLEST/HDLC SERPL: 5.1 {RATIO} (ref 2–5)
CO2 SERPL-SCNC: 26 MMOL/L (ref 23–29)
CREAT SERPL-MCNC: 0.6 MG/DL (ref 0.5–1.4)
EAG (OHS): 111 MG/DL (ref 68–131)
ERYTHROCYTE [DISTWIDTH] IN BLOOD BY AUTOMATED COUNT: 15.4 % (ref 11.5–14.5)
GFR SERPLBLD CREATININE-BSD FMLA CKD-EPI: >60 ML/MIN/1.73/M2
GLUCOSE SERPL-MCNC: 78 MG/DL (ref 70–110)
HBA1C MFR BLD: 5.5 % (ref 4–5.6)
HCT VFR BLD AUTO: 41.2 % (ref 37–48.5)
HDLC SERPL-MCNC: 50 MG/DL (ref 40–75)
HDLC SERPL: 19.5 % (ref 20–50)
HGB BLD-MCNC: 13.5 GM/DL (ref 12–16)
IMM GRANULOCYTES # BLD AUTO: 0.02 K/UL (ref 0–0.04)
IMM GRANULOCYTES NFR BLD AUTO: 0.3 % (ref 0–0.5)
LDLC SERPL CALC-MCNC: 180 MG/DL (ref 63–159)
LYMPHOCYTES # BLD AUTO: 2.06 K/UL (ref 1–4.8)
MCH RBC QN AUTO: 28.3 PG (ref 27–31)
MCHC RBC AUTO-ENTMCNC: 32.8 G/DL (ref 32–36)
MCV RBC AUTO: 86 FL (ref 82–98)
NONHDLC SERPL-MCNC: 207 MG/DL
NUCLEATED RBC (/100WBC) (OHS): 0 /100 WBC
PLATELET # BLD AUTO: 279 K/UL (ref 150–450)
PMV BLD AUTO: 10.2 FL (ref 9.2–12.9)
POTASSIUM SERPL-SCNC: 4.2 MMOL/L (ref 3.5–5.1)
PROT SERPL-MCNC: 7.4 GM/DL (ref 6–8.4)
RBC # BLD AUTO: 4.77 M/UL (ref 4–5.4)
RELATIVE EOSINOPHIL (OHS): 1.3 %
RELATIVE LYMPHOCYTE (OHS): 33.2 % (ref 18–48)
RELATIVE MONOCYTE (OHS): 9 % (ref 4–15)
RELATIVE NEUTROPHIL (OHS): 55.2 % (ref 38–73)
SODIUM SERPL-SCNC: 137 MMOL/L (ref 136–145)
TRIGL SERPL-MCNC: 135 MG/DL (ref 30–150)
WBC # BLD AUTO: 6.2 K/UL (ref 3.9–12.7)

## 2025-04-25 PROCEDURE — 36415 COLL VENOUS BLD VENIPUNCTURE: CPT

## 2025-04-25 PROCEDURE — 82306 VITAMIN D 25 HYDROXY: CPT

## 2025-04-25 PROCEDURE — 84443 ASSAY THYROID STIM HORMONE: CPT

## 2025-04-25 PROCEDURE — 80053 COMPREHEN METABOLIC PANEL: CPT

## 2025-04-25 PROCEDURE — 80061 LIPID PANEL: CPT

## 2025-04-25 PROCEDURE — 99999 PR PBB SHADOW E&M-EST. PATIENT-LVL IV: CPT | Mod: PBBFAC,,, | Performed by: INTERNAL MEDICINE

## 2025-04-25 PROCEDURE — 99214 OFFICE O/P EST MOD 30 MIN: CPT | Mod: PBBFAC | Performed by: INTERNAL MEDICINE

## 2025-04-25 PROCEDURE — 83036 HEMOGLOBIN GLYCOSYLATED A1C: CPT

## 2025-04-25 PROCEDURE — 85025 COMPLETE CBC W/AUTO DIFF WBC: CPT

## 2025-04-25 RX ORDER — SUVOREXANT 10 MG/1
1 TABLET, FILM COATED ORAL NIGHTLY PRN
Qty: 30 TABLET | Refills: 2 | Status: SHIPPED | OUTPATIENT
Start: 2025-04-25 | End: 2025-04-25 | Stop reason: ALTCHOICE

## 2025-04-25 RX ORDER — ZOLPIDEM TARTRATE 5 MG/1
5 TABLET ORAL NIGHTLY PRN
Qty: 30 TABLET | Refills: 1 | Status: SHIPPED | OUTPATIENT
Start: 2025-04-25 | End: 2025-10-24

## 2025-04-25 NOTE — PROGRESS NOTES
"Subjective:       Patient ID: Carmen Manriquez is a 65 y.o. female.    Chief Complaint: Annual Exam  This is a 65-year-old who presents today for physical patient reports overall needed to update some of her medical issues she has been having issues with grief her  passed away in hospice from pancreatic cancer last month.  She does have good family support and remains on her Lexapro which helps she has been having some issues with sleep she uses Ativan but not on a regular basis she was wondering if she could try an alternate sleep medication she is having more trouble falling asleep a lot of stress.  She has quit smoking on and off but recently started again not ready to quit yet.  She continues follow with pain management as she has chronic foot pain she is now on Lyrica which she does feel helps    HPI  Review of Systems   Constitutional:  Positive for activity change. Negative for unexpected weight change.   HENT:  Negative for hearing loss, rhinorrhea and trouble swallowing.    Eyes:  Negative for discharge and visual disturbance.   Respiratory:  Negative for chest tightness and wheezing.    Cardiovascular:  Positive for chest pain and palpitations.   Gastrointestinal:  Negative for blood in stool, constipation, diarrhea and vomiting.   Endocrine: Negative for polydipsia and polyuria.   Genitourinary:  Negative for difficulty urinating, dysuria, hematuria and menstrual problem.   Musculoskeletal:  Positive for arthralgias. Negative for joint swelling and neck pain.   Neurological:  Positive for headaches. Negative for weakness.   Psychiatric/Behavioral:  Positive for dysphoric mood and sleep disturbance. Negative for confusion.        Objective:      Blood pressure 118/68, pulse 80, height 5' 1" (1.549 m), weight 56.7 kg (125 lb), SpO2 98%.   Physical Exam  Constitutional:       General: She is not in acute distress.  HENT:      Head: Normocephalic.      Mouth/Throat:      Pharynx: Oropharynx is clear. "   Eyes:      General: No scleral icterus.  Cardiovascular:      Rate and Rhythm: Normal rate and regular rhythm.      Heart sounds: Normal heart sounds. No murmur heard.     No friction rub. No gallop.      Comments: Breast : normal no masses or tenderness      Pulmonary:      Effort: Pulmonary effort is normal. No respiratory distress.      Breath sounds: Normal breath sounds.   Abdominal:      General: Bowel sounds are normal.      Palpations: Abdomen is soft. There is no mass.      Tenderness: There is no abdominal tenderness.   Musculoskeletal:      Cervical back: Neck supple.   Skin:     Findings: No erythema.   Neurological:      Mental Status: She is alert.   Psychiatric:         Mood and Affect: Mood normal.         Assessment:       1. Annual physical exam    2. Insomnia, unspecified type    3. Hyperlipidemia, unspecified hyperlipidemia type    4. Vitamin D deficiency    5. Hyperglycemia    6. Tobacco use disorder    7. Personal history of nicotine dependence    8. Thyroid nodule    9. Encounter for screening mammogram for breast cancer    10. Pain in both feet    11. Anxiety        Plan:       Carmen was seen today for annual exam.    Diagnoses and all orders for this visit:    Annual physical exam  -     CBC Auto Differential; Future  -     Comprehensive Metabolic Panel; Future  -     Hemoglobin A1C; Future  -     TSH; Future  -     Vitamin D; Future  -     Lipid Panel; Future    Insomnia, unspecified type  -     Discontinue: suvorexant (BELSOMRA) 10 mg Tab; Take 1 tablet by mouth nightly as needed (insomnia).  Was too costly trial of  -     zolpidem (AMBIEN) 5 MG Tab; Take 1 tablet (5 mg total) by mouth nightly as needed (insomnia).    Hyperlipidemia, unspecified hyperlipidemia type  Update labs and review she has tried to work on some dietary measures  -     CBC Auto Differential; Future  -     Comprehensive Metabolic Panel; Future  -     Hemoglobin A1C; Future  -     TSH; Future  -     Lipid Panel;  Future    Vitamin D deficiency  -     Vitamin D; Future  -     Lipid Panel; Future    Hyperglycemia  Update blood work  -     Hemoglobin A1C; Future    Anxiety grief over loss of  , she remains on Lexapro    Tobacco use disorder  Personal history of nicotine dependence  She will work on smoking cessation again when she is ready  -     CT Chest Lung Screening Low Dose; Future    For foot pain complex regional pain syndrome following with pain management currently on Lyrica considering options    Thyroid nodule  She had previous biopsy will update  -     US Thyroid; Future    Encounter for screening mammogram for breast cancer  -     Mammo Digital Screening Bilat w/ [facility] (XPD); Future    Update labs and review she will schedule her annual mammogram she will reach out to her GI doctor to clarify when she is due for her next colonoscopy            Answers submitted by the patient for this visit:  Review of Systems Questionnaire (Submitted on 4/19/2025)  activity change: Yes  unexpected weight change: No  neck pain: No  hearing loss: No  rhinorrhea: No  trouble swallowing: No  eye discharge: No  visual disturbance: No  chest tightness: No  wheezing: No  chest pain: Yes  palpitations: Yes  blood in stool: No  constipation: No  vomiting: No  diarrhea: No  polydipsia: No  polyuria: No  difficulty urinating: No  hematuria: No  menstrual problem: No  dysuria: No  joint swelling: No  arthralgias: Yes  headaches: Yes  weakness: No  confusion: No  dysphoric mood: Yes

## 2025-04-26 LAB
25(OH)D3+25(OH)D2 SERPL-MCNC: 57 NG/ML (ref 30–96)
TSH SERPL-ACNC: 1.08 UIU/ML (ref 0.4–4)

## 2025-05-02 ENCOUNTER — RESULTS FOLLOW-UP (OUTPATIENT)
Dept: INTERNAL MEDICINE | Facility: CLINIC | Age: 65
End: 2025-05-02

## 2025-05-02 ENCOUNTER — TELEPHONE (OUTPATIENT)
Dept: INTERNAL MEDICINE | Facility: CLINIC | Age: 65
End: 2025-05-02
Payer: MEDICARE

## 2025-05-06 ENCOUNTER — PATIENT MESSAGE (OUTPATIENT)
Dept: INTERNAL MEDICINE | Facility: CLINIC | Age: 65
End: 2025-05-06
Payer: MEDICARE

## 2025-05-06 ENCOUNTER — TELEPHONE (OUTPATIENT)
Dept: INTERNAL MEDICINE | Facility: CLINIC | Age: 65
End: 2025-05-06
Payer: MEDICARE

## 2025-05-06 DIAGNOSIS — E78.5 HYPERLIPIDEMIA, UNSPECIFIED HYPERLIPIDEMIA TYPE: Primary | ICD-10-CM

## 2025-05-06 RX ORDER — ROSUVASTATIN CALCIUM 10 MG/1
10 TABLET, COATED ORAL DAILY
Qty: 90 TABLET | Refills: 3 | Status: SHIPPED | OUTPATIENT
Start: 2025-05-06 | End: 2026-05-06

## 2025-05-06 NOTE — TELEPHONE ENCOUNTER
----- Message from Edwige Addison MD sent at 5/6/2025  1:03 PM CDT -----  Called labs reviewed with pt  Discussed statin risk benefits  Start crestor and repeat labs in end of July     Please call and maggie repeat labs end of july  ----- Message -----  From: Lab, Background User  Sent: 4/25/2025  11:29 PM CDT  To: Edwige Addison MD

## 2025-05-16 ENCOUNTER — HOSPITAL ENCOUNTER (OUTPATIENT)
Dept: RADIOLOGY | Facility: HOSPITAL | Age: 65
Discharge: HOME | End: 2025-05-16
Attending: INTERNAL MEDICINE
Payer: MEDICARE

## 2025-05-16 VITALS — WEIGHT: 119 LBS | BODY MASS INDEX: 22.48 KG/M2

## 2025-05-16 DIAGNOSIS — Z87.891 PERSONAL HISTORY OF NICOTINE DEPENDENCE: ICD-10-CM

## 2025-05-16 DIAGNOSIS — E04.1 THYROID NODULE: ICD-10-CM

## 2025-05-16 DIAGNOSIS — Z12.31 ENCOUNTER FOR SCREENING MAMMOGRAM FOR BREAST CANCER: ICD-10-CM

## 2025-05-16 PROCEDURE — 71271 CT THORAX LUNG CANCER SCR C-: CPT | Mod: 26,,, | Performed by: RADIOLOGY

## 2025-05-16 PROCEDURE — 76536 US EXAM OF HEAD AND NECK: CPT | Mod: 26,,, | Performed by: STUDENT IN AN ORGANIZED HEALTH CARE EDUCATION/TRAINING PROGRAM

## 2025-05-16 PROCEDURE — 77063 BREAST TOMOSYNTHESIS BI: CPT | Mod: 26,,, | Performed by: RADIOLOGY

## 2025-05-16 PROCEDURE — 71271 CT THORAX LUNG CANCER SCR C-: CPT | Mod: TC

## 2025-05-16 PROCEDURE — 76536 US EXAM OF HEAD AND NECK: CPT | Mod: TC

## 2025-05-16 PROCEDURE — 77067 SCR MAMMO BI INCL CAD: CPT | Mod: 26,,, | Performed by: RADIOLOGY

## 2025-05-16 PROCEDURE — 77067 SCR MAMMO BI INCL CAD: CPT | Mod: TC

## 2025-07-22 ENCOUNTER — E-VISIT (OUTPATIENT)
Dept: INTERNAL MEDICINE | Facility: CLINIC | Age: 65
End: 2025-07-22
Payer: MEDICARE

## 2025-07-22 DIAGNOSIS — U07.1 COVID-19: Primary | ICD-10-CM

## 2025-07-22 NOTE — PROGRESS NOTES
Patient ID: Carmen Manriquez is a 65 y.o. female.        E-Visit Time Tracking:   Day 1 Time (in minutes): 6  Total Time (in minutes): 6      Chief Complaint: General Illness (Entered automatically based on patient selection in [employer].)      The patient initiated a request through [employer] on 7/22/2025 for evaluation and management with a chief complaint of General Illness (Entered automatically based on patient selection in [employer].)     I evaluated the questionnaire submission on 07/22/2025.    [facility] Evisit [facility]-Cough And Cold    7/22/2025  1:48 PM CDT - Filed by Patient   What do you need help with? Covid 19   Do you agree to participate in an E-Visit? Yes   If you have any of the following symptoms, go to your local emergency room or call 911: I acknowledge   What is the main issue you would like addressed today? I tested positive for Covid today   Do you think you might have COVID-19 or the Flu? (COVID-19, Flu, No, Not sure) COVID-19   Have you tested positive for COVID-19 or Flu?(COVID-19, Flu, No) COVID-19   What symptoms do you have? (Chills, Cough, Diarrhea, Fatigue, Fever, Headache, Stuffy nose,  Loss of taste or smell, Muscule or body aches, Nausea, Runny nose, Sore throat, None) Chills;  Fatigue;  Headache;  Stuffy nose   When did your concern begin? 7/22/2025   What have you tried to help your symptoms?(Cold medication, Cough syrup, Drank fluids (water, tea), Gargled salt water, Ibuprofen or Naproxen, Rest and sleep, Tylenol, Other) Tylenol   Provide any additional information you feel is important. I was hoping to get a prescription for Paxlovid   Please attach any relevant images or files    Are you able to take your vitals? Yes   Systolic Blood Pressure    Diastolic Blood Pressure    Weight: 120   Height: 5   Pluse: 89   Temp 97.3   Resp:    SpO2 98         Encounter Diagnosis   Name Primary?    COVID-19 Yes        No orders of the defined types were placed in this encounter.     Medications  Ordered This Encounter   Medications    nirmatrelvir-ritonavir 300 mg (150 mg x 2)-100 mg copackaged tablets (EUA)     Sig: Take 3 tablets by mouth 2 (two) times daily for 5 days. Each dose contains 2 nirmatrelvir (pink tablets) and 1 ritonavir (white tablet). Take all 3 tablets together     Dispense:  30 tablet     Refill:  0      Hold crestor while taking and 5 days after then resume    No follow-ups on file.

## 2025-08-12 ENCOUNTER — TELEPHONE (OUTPATIENT)
Dept: RESEARCH | Facility: HOSPITAL | Age: 65
End: 2025-08-12
Payer: MEDICARE

## (undated) DEVICE — SEE MEDLINE ITEM 153151

## (undated) DEVICE — BLADE QUADCUT STRAIGHT 4.3MM

## (undated) DEVICE — SEE MEDLINE ITEM 157110

## (undated) DEVICE — SOL NACL 0.9% INJ 500ML BG

## (undated) DEVICE — CONTAINER SPECIMEN STRL 4OZ

## (undated) DEVICE — POSITIONER HEAD DONUT 9IN FOAM

## (undated) DEVICE — SOL NACL 0.45% 1000ML BG

## (undated) DEVICE — HANDPIECE REFLUX ULTRA 45

## (undated) DEVICE — SUT PROLENE 3-0 FS-1 MONO18

## (undated) DEVICE — TRACKER PATIENT NON INVASIVE

## (undated) DEVICE — BLADE MYRING SP TP STR SS STRL

## (undated) DEVICE — SKIN MARKER DEVON 160

## (undated) DEVICE — SYR ONLY LEUR TIP 30CC

## (undated) DEVICE — SOL IRR NACL .9% 3000ML

## (undated) DEVICE — CORD BIPOLAR 12 FOOT

## (undated) DEVICE — SOL 9P NACL IRR PIC IL

## (undated) DEVICE — SINU-FOAM STAMMBERGER

## (undated) DEVICE — SEE MEDLINE ITEM 156934

## (undated) DEVICE — COVER MAYO STAND REINFRCD 30

## (undated) DEVICE — SEE MEDLINE ITEM 152622

## (undated) DEVICE — GLOVE BIOGEL SKINSENSE PI 7.0

## (undated) DEVICE — SPONGE NEURO 1/2 X 2

## (undated) DEVICE — SEE MEDLINE ITEM 146313

## (undated) DEVICE — TRACKER ENT INSTRUMENT

## (undated) DEVICE — POSITIONER IV ARMBOARD FOAM

## (undated) DEVICE — SHEATH CLN ENDOSCRB 4MM